# Patient Record
Sex: MALE | Race: WHITE | Employment: OTHER | URBAN - METROPOLITAN AREA
[De-identification: names, ages, dates, MRNs, and addresses within clinical notes are randomized per-mention and may not be internally consistent; named-entity substitution may affect disease eponyms.]

---

## 2017-01-16 ENCOUNTER — TRANSFERRED RECORDS (OUTPATIENT)
Dept: HEALTH INFORMATION MANAGEMENT | Facility: CLINIC | Age: 67
End: 2017-01-16

## 2017-05-01 ENCOUNTER — TRANSFERRED RECORDS (OUTPATIENT)
Dept: HEALTH INFORMATION MANAGEMENT | Facility: CLINIC | Age: 67
End: 2017-05-01

## 2017-12-26 ENCOUNTER — TRANSFERRED RECORDS (OUTPATIENT)
Dept: HEALTH INFORMATION MANAGEMENT | Facility: CLINIC | Age: 67
End: 2017-12-26

## 2018-02-25 ENCOUNTER — TRANSFERRED RECORDS (OUTPATIENT)
Dept: HEALTH INFORMATION MANAGEMENT | Facility: CLINIC | Age: 68
End: 2018-02-25

## 2018-03-15 ENCOUNTER — TRANSFERRED RECORDS (OUTPATIENT)
Dept: HEALTH INFORMATION MANAGEMENT | Facility: CLINIC | Age: 68
End: 2018-03-15

## 2018-04-08 ENCOUNTER — TRANSFERRED RECORDS (OUTPATIENT)
Dept: HEALTH INFORMATION MANAGEMENT | Facility: CLINIC | Age: 68
End: 2018-04-08

## 2018-04-11 ENCOUNTER — TRANSFERRED RECORDS (OUTPATIENT)
Dept: HEALTH INFORMATION MANAGEMENT | Facility: CLINIC | Age: 68
End: 2018-04-11

## 2018-04-25 ENCOUNTER — TRANSFERRED RECORDS (OUTPATIENT)
Dept: HEALTH INFORMATION MANAGEMENT | Facility: CLINIC | Age: 68
End: 2018-04-25

## 2018-04-27 ENCOUNTER — CARE COORDINATION (OUTPATIENT)
Dept: SURGERY | Facility: CLINIC | Age: 68
End: 2018-04-27

## 2018-04-27 DIAGNOSIS — Z98.84 HISTORY OF BARIATRIC SURGERY: Primary | ICD-10-CM

## 2018-05-31 ENCOUNTER — OFFICE VISIT (OUTPATIENT)
Dept: SURGERY | Facility: CLINIC | Age: 68
End: 2018-05-31
Payer: COMMERCIAL

## 2018-05-31 ENCOUNTER — APPOINTMENT (OUTPATIENT)
Dept: SURGERY | Facility: CLINIC | Age: 68
End: 2018-05-31
Payer: COMMERCIAL

## 2018-05-31 ENCOUNTER — ALLIED HEALTH/NURSE VISIT (OUTPATIENT)
Dept: SURGERY | Facility: CLINIC | Age: 68
End: 2018-05-31
Payer: COMMERCIAL

## 2018-05-31 ENCOUNTER — ANESTHESIA EVENT (OUTPATIENT)
Dept: SURGERY | Facility: CLINIC | Age: 68
End: 2018-05-31

## 2018-05-31 ENCOUNTER — ALLIED HEALTH/NURSE VISIT (OUTPATIENT)
Dept: GASTROENTEROLOGY | Facility: CLINIC | Age: 68
End: 2018-05-31
Payer: COMMERCIAL

## 2018-05-31 VITALS
WEIGHT: 160.2 LBS | OXYGEN SATURATION: 100 % | DIASTOLIC BLOOD PRESSURE: 80 MMHG | SYSTOLIC BLOOD PRESSURE: 133 MMHG | HEART RATE: 95 BPM | HEIGHT: 70 IN | BODY MASS INDEX: 22.94 KG/M2 | TEMPERATURE: 97.5 F

## 2018-05-31 VITALS
TEMPERATURE: 97.5 F | DIASTOLIC BLOOD PRESSURE: 80 MMHG | HEIGHT: 70 IN | BODY MASS INDEX: 23.29 KG/M2 | WEIGHT: 162.7 LBS | SYSTOLIC BLOOD PRESSURE: 133 MMHG | OXYGEN SATURATION: 100 % | RESPIRATION RATE: 18 BRPM | HEART RATE: 95 BPM

## 2018-05-31 DIAGNOSIS — K28.7: Primary | ICD-10-CM

## 2018-05-31 DIAGNOSIS — Z01.818 PREOP EXAMINATION: ICD-10-CM

## 2018-05-31 DIAGNOSIS — I34.0 MITRAL VALVE INSUFFICIENCY, UNSPECIFIED ETIOLOGY: ICD-10-CM

## 2018-05-31 DIAGNOSIS — Z98.84 HISTORY OF ROUX-EN-Y GASTRIC BYPASS: Primary | ICD-10-CM

## 2018-05-31 DIAGNOSIS — Z87.891 HISTORY OF SMOKING: ICD-10-CM

## 2018-05-31 DIAGNOSIS — Z98.84 H/O GASTRIC BYPASS: ICD-10-CM

## 2018-05-31 DIAGNOSIS — R60.0 PERIPHERAL EDEMA: ICD-10-CM

## 2018-05-31 DIAGNOSIS — Z01.818 PREOP EXAMINATION: Primary | ICD-10-CM

## 2018-05-31 DIAGNOSIS — R06.09 EXERTIONAL DYSPNEA: ICD-10-CM

## 2018-05-31 DIAGNOSIS — I35.0 MODERATE AORTIC STENOSIS: ICD-10-CM

## 2018-05-31 LAB
ABO + RH BLD: NORMAL
ABO + RH BLD: NORMAL
ALBUMIN UR-MCNC: NEGATIVE MG/DL
ANION GAP SERPL CALCULATED.3IONS-SCNC: 7 MMOL/L (ref 3–14)
APPEARANCE UR: CLEAR
BILIRUB UR QL STRIP: NEGATIVE
BLD GP AB SCN SERPL QL: NORMAL
BLOOD BANK CMNT PATIENT-IMP: NORMAL
BLOOD BANK CMNT PATIENT-IMP: NORMAL
BUN SERPL-MCNC: 8 MG/DL (ref 7–30)
CALCIUM SERPL-MCNC: 8.8 MG/DL (ref 8.5–10.1)
CHLORIDE SERPL-SCNC: 108 MMOL/L (ref 94–109)
CO2 SERPL-SCNC: 26 MMOL/L (ref 20–32)
COLOR UR AUTO: YELLOW
CREAT SERPL-MCNC: 0.76 MG/DL (ref 0.66–1.25)
ERYTHROCYTE [DISTWIDTH] IN BLOOD BY AUTOMATED COUNT: 19.9 % (ref 10–15)
GFR SERPL CREATININE-BSD FRML MDRD: >90 ML/MIN/1.7M2
GLUCOSE SERPL-MCNC: 91 MG/DL (ref 70–99)
GLUCOSE UR STRIP-MCNC: NEGATIVE MG/DL
HCT VFR BLD AUTO: 46.2 % (ref 40–53)
HGB BLD-MCNC: 14.2 G/DL (ref 13.3–17.7)
HGB UR QL STRIP: NEGATIVE
INR PPP: 1.11 (ref 0.86–1.14)
KETONES UR STRIP-MCNC: NEGATIVE MG/DL
LEUKOCYTE ESTERASE UR QL STRIP: NEGATIVE
MCH RBC QN AUTO: 26.9 PG (ref 26.5–33)
MCHC RBC AUTO-ENTMCNC: 30.7 G/DL (ref 31.5–36.5)
MCV RBC AUTO: 88 FL (ref 78–100)
NITRATE UR QL: NEGATIVE
PH UR STRIP: 5 PH (ref 5–7)
PLATELET # BLD AUTO: 155 10E9/L (ref 150–450)
POTASSIUM SERPL-SCNC: 3.5 MMOL/L (ref 3.4–5.3)
RBC # BLD AUTO: 5.28 10E12/L (ref 4.4–5.9)
SODIUM SERPL-SCNC: 140 MMOL/L (ref 133–144)
SOURCE: ABNORMAL
SP GR UR STRIP: 1.02 (ref 1–1.03)
SPECIMEN EXP DATE BLD: NORMAL
UROBILINOGEN UR STRIP-MCNC: 4 MG/DL (ref 0–2)
WBC # BLD AUTO: 6.3 10E9/L (ref 4–11)

## 2018-05-31 RX ORDER — LISINOPRIL 10 MG/1
10 TABLET ORAL EVERY MORNING
Status: ON HOLD | COMMUNITY
Start: 2018-01-29 | End: 2018-08-02

## 2018-05-31 RX ORDER — SUCRALFATE 1 G/1
1 TABLET ORAL 4 TIMES DAILY
COMMUNITY
Start: 2018-05-21 | End: 2019-01-25

## 2018-05-31 RX ORDER — ATORVASTATIN CALCIUM 20 MG/1
20 TABLET, FILM COATED ORAL AT BEDTIME
COMMUNITY
Start: 2018-01-29

## 2018-05-31 RX ORDER — DIGOXIN 125 MCG
125 TABLET ORAL EVERY MORNING
Status: ON HOLD | COMMUNITY
Start: 2018-04-20 | End: 2018-08-01

## 2018-05-31 RX ORDER — OMEPRAZOLE 40 MG/1
40 CAPSULE, DELAYED RELEASE ORAL 2 TIMES DAILY
Qty: 60 CAPSULE | Refills: 11 | Status: SHIPPED | OUTPATIENT
Start: 2018-05-31

## 2018-05-31 RX ORDER — ALBUTEROL SULFATE 90 UG/1
1-2 AEROSOL, METERED RESPIRATORY (INHALATION) EVERY 6 HOURS PRN
COMMUNITY
Start: 2018-04-10

## 2018-05-31 RX ORDER — IPRATROPIUM BROMIDE AND ALBUTEROL SULFATE 2.5; .5 MG/3ML; MG/3ML
3 SOLUTION RESPIRATORY (INHALATION) 3 TIMES DAILY
COMMUNITY
Start: 2018-04-26 | End: 2019-01-25

## 2018-05-31 RX ORDER — OXYCODONE AND ACETAMINOPHEN 10; 325 MG/1; MG/1
1 TABLET ORAL EVERY 4 HOURS
COMMUNITY
Start: 2018-05-29

## 2018-05-31 RX ORDER — CLONIDINE HYDROCHLORIDE 0.1 MG/1
0.1 TABLET ORAL 2 TIMES DAILY
Status: ON HOLD | COMMUNITY
Start: 2018-01-31 | End: 2018-08-02

## 2018-05-31 RX ORDER — PROCHLORPERAZINE MALEATE 5 MG
5 TABLET ORAL EVERY 6 HOURS PRN
COMMUNITY
Start: 2018-04-08 | End: 2019-09-26

## 2018-05-31 RX ORDER — ALBUTEROL SULFATE 0.83 MG/ML
2.5 SOLUTION RESPIRATORY (INHALATION) 2 TIMES DAILY
COMMUNITY
Start: 2018-04-26 | End: 2019-01-25

## 2018-05-31 RX ORDER — SUMATRIPTAN 100 MG/1
100 TABLET, FILM COATED ORAL 2 TIMES DAILY PRN
COMMUNITY
Start: 2018-04-10

## 2018-05-31 RX ORDER — PREGABALIN 100 MG/1
100 CAPSULE ORAL 4 TIMES DAILY
COMMUNITY
Start: 2018-05-24

## 2018-05-31 RX ORDER — FENTANYL 50 UG/1
1 PATCH TRANSDERMAL
COMMUNITY
Start: 2018-05-04

## 2018-05-31 RX ORDER — OMEPRAZOLE 40 MG/1
40 CAPSULE, DELAYED RELEASE ORAL EVERY MORNING
COMMUNITY
Start: 2017-12-18 | End: 2018-07-19

## 2018-05-31 ASSESSMENT — ENCOUNTER SYMPTOMS: DYSRHYTHMIAS: 1

## 2018-05-31 NOTE — LETTER
"2018       RE: Migel Stringer  [address]  [facility] [address]     Dear Colleague,    Thank you for referring your patient, Migel Stringer, to the [facility] SURGICAL WEIGHT MANAGEMENT at [facility]. Please see a copy of my visit note below.     New Bariatric Surgery Re-Establish Care/Referral Consultation      RE: Migel Stringer  MR#: [id number]  : 1950  NEETA: 18    Requesting provider: Navin Valle    Chief Complaint/Reason for visit: evaluation for possible reversal of gastric bypass    HISTORY OF PRESENT ILLNESS: I had the pleasure of seeing your patient, Migel Stringer, today in our bariatric surgery clinic.  As you know, Migel Stringer is 68 year old.  He has a height of 5' 9.5\", a weight of 160 lbs 3.2 oz, and calculated Body mass index is 23.32 kg/(m^2)..      He has a history of Robel-En-Y gastric bypass in . He has had chronic problems with food intolerance. He has had frequent problems with marginal ulceration. He continued to smoke until recently, but has quit smoking cigarettes. He continues to smoke cigars. 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 He continues to have food intolerance and vomiting. He has lost weight recently.     Last albumin 2018 was 3.4. He is interested in undergoing reversal of his bypass. Of note he has a known AAA which is being worked up at [facility]. He also has a recently diagnosed renal mass concerning for malignancy. This has not been biopsied or worked up yet.     Weight Loss History Reviewed with Patient 2018   How long have you been overweight? From Middle age and beyond   What is the most that you have ever weighed? 270   What is the most weight you have lost? 110   I have tried the following methods to lose weight Weight Loss Surgery   I have tried the following weight loss medications? (Check all that apply) None "   Have you ever had weight loss surgery? Yes   Please select the type of weight loss surgery you had (select all that apply): gastric bypass / Robel-en-Y       Past Medical History:   Diagnosis Date     AAA (abdominal aortic aneurysm) (H)      Aortic stenosis      Asthma      Atrial fibrillation (H)      Chronic low back pain      Chronic nausea      GERD (gastroesophageal reflux disease)      Heart murmur      Heart rate problem      History of sleep apnea      Hyperlipidemia      Hypertension      Insomnia      Migraines      Mitral regurgitation      Peripheral neuropathy      Status post gastric bypass for obesity      Ulcer, gastric, acute      Vomiting in adult        Past Surgical History:   Procedure Laterality Date     CHOLECYSTECTOMY       COLONOSCOPY       ESOPHAGOGASTRODUODENOSCOPY       GASTRIC BYPASS  2011     HERNIA REPAIR       SPINE SURGERY      hx of 6 lumbar surgeries and 1 thoracic surgery       FAMILY HISTORY:   Family History   Problem Relation Age of Onset     Skin Cancer Mother      Chronic Obstructive Pulmonary Disease Mother      DIABETES Mother      Liver Cancer Father      Breast Cancer Father      Breast Cancer Sister      No Known Problems Brother      DIABETES Maternal Grandmother      CANCER Paternal Grandmother      unspecified cancer     No Known Problems Brother      CANCER Paternal Grandfather      unspecified cancer       SOCIAL HISTORY:   Social History Questions Reviewed With Patient 5/31/2018   Which best describes your employment status (select all that apply) I am laid off   If you work, what is your occupation?    Which best describes your marital status:    Do you have children? Yes   Who do you have in your support network that can be available to help you for the first 2 weeks after surgery? my wife   Who can you count on for support throughout your weight loss surgery journey? my wife   Can you afford 3 meals a day?  Yes   Can you afford 50-60 dollars  "a month for vitamins? No       PSYCHOLOGICAL HISTORY:   Psychological History Reviewed With Patient 5/31/2018   Have you ever attempted suicide? Never.   Have you had thoughts of suicide in the past year? No   Have you ever been hospitalized for mental illness or a suicide attempt? Never.   Do you have a history of chronic pain? Yes   Have you ever been diagnosed with fibromyalgia? No   Are you currently seeing a therapist or counselor?  No   Are you currently seeing a psychiatrist? No       ROS    MEDICATIONS:  Current Outpatient Prescriptions   Medication     albuterol (2.5 MG/3ML) 0.083% neb solution     albuterol (PROAIR HFA/PROVENTIL HFA/VENTOLIN HFA) 108 (90 Base) MCG/ACT Inhaler     aspirin-acetaminophen-caffeine (EXCEDRIN MIGRAINE) 250-250-65 MG per tablet     atorvastatin (LIPITOR) 20 MG tablet     cloNIDine (CATAPRES) 0.1 MG tablet     digoxin (LANOXIN) 125 MCG tablet     diphenhydrAMINE-acetaminophen (TYLENOL PM)  MG tablet     fentaNYL (DURAGESIC) 50 mcg/hr 72 hr patch     ipratropium - albuterol 0.5 mg/2.5 mg/3 mL (DUONEB) 0.5-2.5 (3) MG/3ML neb solution     lisinopril (PRINIVIL/ZESTRIL) 10 MG tablet     omeprazole (PRILOSEC) 40 MG capsule     oxyCODONE-acetaminophen (PERCOCET)  MG per tablet     pregabalin (LYRICA) 100 MG capsule     prochlorperazine (COMPAZINE) 5 MG tablet     sucralfate (CARAFATE) 1 GM tablet     SUMAtriptan (IMITREX) 100 MG tablet     tiZANidine (ZANAFLEX) 4 MG tablet     No current facility-administered medications for this visit.        ALLERGIES:  Allergies   Allergen Reactions     Heparin      Other reaction(s): Thrombocytopenia  Platelets dropped precipitously 12/29/17 during hospitalization. HIT antibodies were not checked       /80  Pulse 95  Temp 97.5  F (36.4  C) (Oral)  Ht 1.765 m (5' 9.5\")  Wt 72.7 kg (160 lb 3.2 oz)  SpO2 100%  BMI 23.32 kg/m2    PHYSICAL EXAM:  Neurological: A & O x 3  Eyes: PERRL  ENT: mucous membranes moist  Skin: warm and " dry  Respiratory: Respirations unlabored  CV: Regular  Abdomen: Soft, nondistended. Minimal epigastric tenderness. Lap scars and midline laparotomy incision (spine surgery)    In summary, Migel Stringer is a 68 year old male who has a history of Gastric Bypass who is interested in reversal due to food intolerance and ulceration. He has had recent dilation of his strictured GJ anastomosis but still has very frequent vomiting. He is undergoing evaluation of a AAA and new renal mass. Also found to have cardiac murmur and echocardiogram is pending.     PLAN:  Increase omeprazole to BID      Plan for EGD with stent placement ([employer]), will be done soon in [facility] by Dr Ramsey.     Anticipate this will help with nausea/vomiting/food intolerance and help his nutrition      Primary care/urology to follow up for renal mass workup prior to any surgical intervention.    Workup of AAA pending in June with vascular surgeon from [facility] per PAC clinic.    Will need to absolutely quit smoking cigars prior to any possible surgery    Sincerely,    Jalen Ramsey MD  Surgery  [phone] (hospital )  [phone] (clinic nurses)

## 2018-05-31 NOTE — ADDENDUM NOTE
Addendum  created 05/31/18 1451 by Jyothi Whitmore APRN CNP    Diagnosis association updated, Visit diagnoses modified

## 2018-05-31 NOTE — NURSING NOTE
"(   Chief Complaint   Patient presents with     Consult     NRB, discuss reversal per [facility] OR date 6/22/18    )    ( Weight: 160 lb 3.2 oz )  ( Height: 5' 9.5\" )  ( BMI (Calculated): 23.37 )  ( Initial Weight: 160 lb 3.2 oz )  ( Cumulative weight loss (lbs): 0 )  (   )  (   )  (   )  (   )    ( BP: 133/80 )  (   )  ( Temp: 97.5  F (36.4  C) )  ( Temp src: Oral )  ( Pulse: 95 )  (   )  ( SpO2: 100 % )    ( There is no problem list on file for this patient.   )  (   Current Outpatient Prescriptions   Medication Sig Dispense Refill     albuterol (2.5 MG/3ML) 0.083% neb solution Inhale 2.5 mg into the lungs 2 times daily       albuterol (PROAIR HFA/PROVENTIL HFA/VENTOLIN HFA) 108 (90 Base) MCG/ACT Inhaler 2 times daily       aspirin-acetaminophen-caffeine (EXCEDRIN MIGRAINE) 250-250-65 MG per tablet Take 1 tablet by mouth as needed for headaches       atorvastatin (LIPITOR) 20 MG tablet Take 20 mg by mouth every morning       cloNIDine (CATAPRES) 0.1 MG tablet Take 0.1 mg by mouth 2 times daily       digoxin (LANOXIN) 125 MCG tablet Take 125 mcg by mouth every morning       diphenhydrAMINE-acetaminophen (TYLENOL PM)  MG tablet Take 6 tablets by mouth At Bedtime       fentaNYL (DURAGESIC) 50 mcg/hr 72 hr patch 1 patch every 72 hours       ipratropium - albuterol 0.5 mg/2.5 mg/3 mL (DUONEB) 0.5-2.5 (3) MG/3ML neb solution Inhale 3 mLs into the lungs 3 times daily       lisinopril (PRINIVIL/ZESTRIL) 10 MG tablet Take 10 mg by mouth every morning       omeprazole (PRILOSEC) 40 MG capsule Take 40 mg by mouth every morning       oxyCODONE-acetaminophen (PERCOCET)  MG per tablet Take 1 tablet by mouth every 4 hours       pregabalin (LYRICA) 100 MG capsule Take 100 mg by mouth 4 times daily       prochlorperazine (COMPAZINE) 5 MG tablet Take 5 mg by mouth as needed       sucralfate (CARAFATE) 1 GM tablet Take 1 g by mouth 4 times daily       SUMAtriptan (IMITREX) 100 MG tablet Take 100 mg by mouth " as needed       tiZANidine (ZANAFLEX) 4 MG tablet Take 4 mg by mouth as needed      )  ( Diabetes Eval:    )    ( Pain Eval:  Data Unavailable )    ( Wound Eval:       )    (   History   Smoking Status     Former Smoker     Packs/day: 1.00     Years: 50.00     Types: Cigarettes, Cigars     Quit date: 2016   Smokeless Tobacco     Never Used     Comment: quit cigarettes in 2016, but still smokes an occasional cigar( 5/31/2018)    )  Patient medication list, drug allergies and history was reviewed today at PAC.    ( Signed By:  Debi Rocha; May 31, 2018; 12:50 PM )

## 2018-05-31 NOTE — PATIENT INSTRUCTIONS
Preparing for Your Surgery      Name:  Migel Stringer   MRN:  [id number]   :  1950   Today's Date:  2018     Arriving for surgery:  Surgery date:  You are currently not on the surgery schedule.  You will be called 1-2 days prior to surgery by Preadmission Nursing, [phone].  You will be called with time, date, location and diet for surgery.        What can I eat or drink?  -  You may have solid food or milk products until 2 days prior to your surgery, then begin Clear liquid diet.  Please refer to instructions from Dr. Ramsey's office.   -  After Midnight the day of surgery you may have water until 3 hours prior to your surgery.    Which medicines can I take?  -  Do NOT take these medications in the morning, the day of surgery:  Stop vitamins and supplements one week prior to surgery.  Please stop Excedrin Migraine with aspirin one week prior to surgery.  Please do not take Lisinopril, sucralfate and imitrex the morning of surgery.     -  Please take these medications the day of surgery:  Please take clonidine, digoxin, fentanyl patch, Albuterol Neb, Albuterol inhaler, DuoNeb, omeprazole(prilosec) and pregabalin (lyrica) the morning of surgery.  You may take Percocet, compazine and/or tizanidine the morning of procedure if needed.    Bring inhalers with the day of surgery.     How do I prepare myself?  -  Take two showers: one the night before surgery; and one the morning of surgery.         Use Scrubcare or Hibiclens to wash from neck down.  You may use your own shampoo and conditioner. No other hair products.   -  Do NOT use lotion, powder, deodorant, or antiperspirant the day of your surgery.  -  Do NOT wear any makeup, fingernail polish or jewelry.  - Do not bring your own medications to the hospital, except for inhalers and eye drops.  -  Bring your ID and insurance card.    Questions or Concerns:  If you have questions or concerns regarding the day of surgery, please call: for [facility] call [phone], for the [facility] call [phone].  After surgery please call your surgeons office.           AFTER YOUR SURGERY  Breathing exercises   Breathing exercises help you recover faster. Take deep breaths and let the air out slowly. This will:     Help you wake up after surgery.    Help prevent complications like pneumonia.  Preventing complications will help you go home sooner.   We may give you a breathing device (incentive spirometer) to encourage you to breathe deeply.   Nausea and vomiting   You may feel sick to your stomach after surgery; if so, let your nurse know.    Pain control:  After surgery, you may have pain. Our goal is to help you manage your pain. Pain medicine will help you feel comfortable enough to do activities that will help you heal.  These activities may include breathing exercises, walking and physical therapy.   To help your health care team treat your pain we will ask: 1) If you have pain  2) where it is located 3) describe your pain in your words  Methods of pain control include medications given by mouth, vein or by nerve block for some surgeries.  We may give you a pain control pump that will:  1) Deliver the medicine through a tube placed in your vein  2) Control the amount of medicine you receive  3) Allow you to push a button to deliver a dose of pain medicine  Sequential Compression Device (SCD) or Pneumo Boots:  You may need to wear SCD S on your legs or feet. These are wraps connected to a machine that pumps in air and releases it. The repeated pumping helps prevent blood clots from forming.

## 2018-05-31 NOTE — ANESTHESIA PREPROCEDURE EVALUATION
Anesthesia Evaluation     . Pt has had prior anesthetic. Type: General    No history of anesthetic complications          ROS/MED HX    ENT/Pulmonary:     (+)BHANU risk factors hypertension, daytime somnolence, Current use 1 packs/day  Moderate Persistent asthma Last exacerbation: once in the last year (unsure of date),Treatment: Nebulizer prn and Inhaler prn,  , . .   Tobacco use: quit cigarettes in 2016, but still has an occasional cigar.   Neurologic:     (+)neuropathy - RLE, migraines,     Cardiovascular: Comment: AAA    (+) Dyslipidemia, hypertension----. : . . LUCIANO, . :. dysrhythmias a-fib, valvular problems/murmurs type: AS and MR . Previous cardiac testing Echodate:10/2017results:Final Impressions:   1. Normal LV size, mildly increased wall thickness, hyperdynamic global systolic function with an estimated EF of 60 - 65%.   2. The aortic valve is calcified, moderate stenosis (M mmHg, DULCE: 1.2 cm2) and trivial regurgitation.   3. Right ventricular cavity size is normal, global systolic RV function is normal.   4. The mitral valve is sclerotic, mild to moderate mitral regurgitation.   5. The inferior vena cava is dilated, respiratory size variation less than 50%, consistent with elevated right atrial pressure.   6. Moderate biatrial enalrgement.date: results: date: results: date: results:         (-) taking anticoagulants/antiplatelets   METS/Exercise Tolerance:  1 - Eating, dressing   Hematologic:     (+) History of Transfusion no previous transfusion reaction -     (-) history of blood clots   Musculoskeletal:   (+) , , other musculoskeletal- chronic back pain      GI/Hepatic:     (+) GERD Symptomatic, bowel prep, Other GI/Hepatic chronic daily nausea and vomiting      Renal/Genitourinary:     (+) Other Renal/ Genitourinary, left renal mass      Endo:  - neg endo ROS       Psychiatric:     (+) psychiatric history other (comment) (insomnia)      Infectious Disease:  - neg infectious disease ROS        Malignancy:      - no malignancy   Other: Comment: Chronic fatigue   (+) H/O Chronic Pain,H/O chronic opiod use ,                    Physical Exam      Airway   Mallampati: III  TM distance: >3 FB  Neck ROM: full    Dental   (+) chipped and missing  Comment: 2 right upper broken teeth, 2 right upper missing teeth    Cardiovascular   Rhythm and rate: regular and normal  (+) peripheral edema and murmur       Pulmonary (+) wheezes                  PAC Discussion and Assessment    ASA Classification: 3  Case is suitable for: [place]  Anesthetic techniques and relevant risks discussed: GA  Invasive monitoring and risk discussed:   Types:   Possibility and Risk of blood transfusion discussed:   NPO instructions given:   Additional anesthetic preparation and risks discussed:   Needs early admission to pre-op area:   Other:     PAC Resident/NP Anesthesia Assessment:  Migel Roche is a 68 year old male not yet scheduled for a possible gastric bypass reversal on TBD by Dr. Ramsey in treatment of chronic complications s/p gastric bypass in 2011.  PAC referral for risk assessment and optimization for anesthesia with comorbid conditions of: atrial fibrillation, hypertension, hyperlipidemia, aortic stenosis, mitral valve regurgitation, AA, asthma, left renal mass, history of sleep apnea, chronic fatigue, migraines, chronic low back pain, GERD and insomnia.      Pre-operative considerations:  1.  Cardiac:  Functional status- METS 1.  The patient is minimally physically active due to chronic fatigue and weakness that he attributes to his chronic complications s/p gastric bypass.  He will get SOB with minimal exertion.  He is on digoxin for A-fib, but reports he was told he didn't need to be anticoagulated.  Hypertension is managed with lisinopril and clonidine; hold lisinopril DOS.  He has chronic BLE peripheral edema and reports significant fatigue.  Per an echocardiogram done in Oct 2017 he has moderate aortic stenosis and  mitral regurgitation.  He doesn't follow with cardiology.  He smoked cigarettes for approximately 50 years until 2016, but does still smoke an occasional cigar.  Due to the noted history and the upcoming surgery a follow up stress echo has been ordered for further evaluation.  It was also noted upon Care Everywhere chart review that the patient has a AAA that is approximately 4.0 -5.1cm (size dependent on different radiologic imaging) and when asked about this his wife notes that they were referred to vascular at either [facility] or [facility], but haven't scheduled yet.  Intermediate risk surgery with 0.9% risk of major adverse cardiac event.   2.  Pulm:  Airway feasible.  BHANU risk: intermediate.  He reports that he was previously diagnosed with sleep apnea and had a CPAP, but since losing over 100lbs since his gastric bypass in 2011 he stopped using the CPAP and no longer has symptoms of sleep apnea.  He has not been rechecked with a follow up sleep study though.  He reports having asthma, but never having a PFT before.  When questioned about his albuterol and duoneb use, he reports that he uses them every day despite them being prn meds.  He has never been on any maintenance inhalers.  He is wheezing throughout all lung fields on exam today.  A call has been placed to his primary care clinic and I discussed with Dr. Dorado (his pcp).  Requested that he order a PFT and get the patient started on maintenance therapy prior to surgery.  He will have the patient contacted to schedule. Will order duoneb for pre-op.  Smoking history of as above.    3.  GI:  Risk of PONV score = 1.  If > 2, anti-emetic intervention recommended.  No noted history of PONV, but he does have chronic N/V.  Prevention measures as per anesthesia DOS.  GERD isn't currently well controlled with omeprazole and carafate.    4. Neuro:  He has RLE peripheral neuropathy secondary to a traumatic spinal cord injury in 2004 and subsequent spinal  surgeries.  He managed intermittent migraines with prn excedrin and imitrex.  Instructed to hold excedrin for 7 days prior to surgery and imitrex for 24 hours prior to surgery.  5. Musculoskeletal/ pain:  He takes 5-6 tablets of percocet per day for chronic back pain.  Morphine equivalent dosing = 75-90mg.  Consider cautious positioning.    6. Renal: Upon Care Everywhere chart review it was noted that the patient had a CT scan in Feb 2018 that showed at 2.7cm left renal mass and there has been no follow up imaging as recommended by radiology at that time.  Encouraged the patient to discuss with his primary care provider and request further evaluation.      VTE risk: 1.8%    Patient is not yet optimized and will need more diagnostic testing prior to this elective procedure. The following needs to be addressed/completed prior to surgery:  Dobutamine stress echo, PFT and asthma/COPD management, further evaluation of renal mass and surgical consultation regarding the AAA.   This has all been discussed with Dr. Ramsey, the patient and the spouse.    Patient also evaluated by Dr. Jules. See recommendations below.     **For further details of assessment, testing, and physical exam please see H and P completed on same date.          Jyothi Whitmore DNP, RN, APRN      Reviewed and Signed by PAC Mid-Level Provider/Resident  Mid-Level Provider/Resident: Jyothi Whitmore DNP, RN, APRN  Date: 5/31/2018  Time: 1200    Attending Anesthesiologist Anesthesia Assessment:  68 year old for possible gastric bypass reversal due to significant vomiting, fatigue, etc. Chart reviewed, patient seen and evaluated; agree with above assessment. Patient has known aortic stenosis with DULCE 1.2 and gradient 25 mmHg a year ago. Progress ion of AS is an unlikely but potentially dangerous explanation for his fatigue and SOB so will get echo prior to surgery. Also could be anginal equivalent , so will get stress echo.     Also has wheezing today. That  with his SOB could represent COPD, so will have his PCP do PFTs and initiate long acting bronchodilator prior to surgery. Finally, will discuss with Dr. Ramsey the fact that abdominal echo a year ago showed 5.1 AAA.     Patient is not appropriate for the planned procedure without further workup or medical management change. We will continue to follow and work with his PCP to improve his pulmonary status and resolve the question of AS. The final anesthesia plan will be determined by the physician anesthesiologist caring for the patient on the day of surgery.      Reviewed and Signed by PAC Anesthesiologist  Anesthesiologist: chinmay  Date: 5/31/2018  Time:   Pass/Fail: Pass  Disposition:     PAC Pharmacist Assessment:        Pharmacist:   Date:   Time:                           .

## 2018-05-31 NOTE — H&P
Pre-Operative H & P     CC:  Preoperative exam to assess for increased cardiopulmonary risk while undergoing surgery and anesthesia.    Date of Encounter: 5/31/2018  Primary Care Physician:  No Ref-Primary, Physician  Associated diagnosis:  S/p gastric bypass    HPI  Migel Stringer is a 68 year old male who presents for pre-operative H & P in preparation for a possible gastric bypass reversal with Dr. Ramsey on TBD at [facility].     Migel Roche is a 68 year old male with atrial fibrillation, hypertension, hyperlipidemia, aortic stenosis, mitral valve regurgitation, AA, asthma, left renal mass, history of sleep apnea, chronic fatigue, migraines, chronic low back pain, GERD and insomnia that has had multiple complications since having a gastric bypass in 2011.  He reports having chronic GERD (not controlled well by medication), nausea, weight loss, fatigue and weakness ever since the surgery.  He has difficulty eating much due to the symptoms and has lost >100lbs.  He reports that he previously was told there was nothing that could be done, but then his wife found though some internet research that a reversal of the gastric bypass may be possible.  He is scheduled to consult with Dr. Ramsey today to discuss the possibility of that type of surgery.        History is obtained from the patient and his spouse.     Past Medical History  Past Medical History:   Diagnosis Date     Aortic stenosis      Asthma      Atrial fibrillation (H)      Chronic low back pain      Chronic nausea      GERD (gastroesophageal reflux disease)      History of sleep apnea      Hyperlipidemia      Hypertension      Insomnia      Migraines      Mitral regurgitation      Peripheral neuropathy      Status post gastric bypass for obesity      Vomiting in adult        Past Surgical History  Past Surgical History:   Procedure Laterality Date     COLONOSCOPY       ESOPHAGOGASTRODUODENOSCOPY        GASTRIC BYPASS  2011     HERNIA REPAIR       SPINE SURGERY      hx of 6 lumbar surgeries and 1 thoracic surgery       Hx of Blood transfusions/reactions: yes, no reactions    Hx of abnormal bleeding or anti-platelet use: none    Steroid use in the last year: short course of prednisone once in the last year (unknown timeframe), but no long term steroids.     Personal or FH with difficulty with Anesthesia:  none    Prior to Admission Medications  Current Outpatient Prescriptions   Medication Sig Dispense Refill     albuterol (2.5 MG/3ML) 0.083% neb solution Inhale 2.5 mg into the lungs 2 times daily       albuterol (PROAIR HFA/PROVENTIL HFA/VENTOLIN HFA) 108 (90 Base) MCG/ACT Inhaler 2 times daily       aspirin-acetaminophen-caffeine (EXCEDRIN MIGRAINE) 250-250-65 MG per tablet Take 1 tablet by mouth as needed for headaches       atorvastatin (LIPITOR) 20 MG tablet Take 20 mg by mouth every morning       cloNIDine (CATAPRES) 0.1 MG tablet Take 0.1 mg by mouth 2 times daily       digoxin (LANOXIN) 125 MCG tablet Take 125 mcg by mouth every morning       diphenhydrAMINE-acetaminophen (TYLENOL PM)  MG tablet Take 6 tablets by mouth At Bedtime       fentaNYL (DURAGESIC) 50 mcg/hr 72 hr patch 1 patch every 72 hours       ipratropium - albuterol 0.5 mg/2.5 mg/3 mL (DUONEB) 0.5-2.5 (3) MG/3ML neb solution Inhale 3 mLs into the lungs 3 times daily       lisinopril (PRINIVIL/ZESTRIL) 10 MG tablet Take 10 mg by mouth every morning       omeprazole (PRILOSEC) 40 MG capsule Take 40 mg by mouth every morning       oxyCODONE-acetaminophen (PERCOCET)  MG per tablet Take 1 tablet by mouth every 4 hours       pregabalin (LYRICA) 100 MG capsule Take 100 mg by mouth 4 times daily       prochlorperazine (COMPAZINE) 5 MG tablet Take 5 mg by mouth as needed       sucralfate (CARAFATE) 1 GM tablet Take 1 g by mouth 4 times daily       SUMAtriptan (IMITREX) 100 MG tablet Take 100 mg by mouth as needed       tiZANidine (ZANAFLEX)  4 MG tablet Take 4 mg by mouth as needed         Allergies  Allergies   Allergen Reactions     Heparin      Other reaction(s): Thrombocytopenia  Platelets dropped precipitously 12/29/17 during hospitalization. HIT antibodies were not checked       Social History  Social History     Social History     Marital status:      Spouse name: N/A     Number of children: 4     Years of education: N/A     Occupational History      [employer]     Social History Main Topics     Smoking status: Former Smoker     Packs/day: 1.00     Years: 50.00     Types: Cigarettes, Cigars     Quit date: 2016     Smokeless tobacco: Never Used      Comment: quit cigarettes in 2016, but still smokes an occasional cigar( 5/31/2018)     Alcohol use Not on file      Comment: rare     Drug use: No     Sexual activity: Not on file     Other Topics Concern     Not on file     Social History Narrative     No narrative on file       Family History  Family History   Problem Relation Age of Onset     Skin Cancer Mother      Chronic Obstructive Pulmonary Disease Mother      Liver Cancer Father      Breast Cancer Sister      No Known Problems Brother      DIABETES Maternal Grandmother      CANCER Paternal Grandmother      unspecified cancer     No Known Problems Brother      CANCER Paternal Grandfather      unspecified cancer             ROS/MED HX  The complete review of systems is negative other than noted in the HPI or here.   ENT/Pulmonary:     (+)BHANU risk factors hypertension, daytime somnolence, Current use 1 packs/day  Moderate Persistent asthma Last exacerbation: once in the last year (unsure of date),Treatment: Nebulizer prn and Inhaler prn,  , . .   Tobacco use: quit cigarettes in 2016, but still has an occasional cigar.   Neurologic:     (+)neuropathy - RLE, migraines,     Cardiovascular:     (+) Dyslipidemia, hypertension----. : . . LUCIANO, . :. dysrhythmias a-fib, valvular problems/murmurs type: AS and MR .  AAA  "4-5.1cm  METS/Exercise Tolerance:  1 - Eating, dressing   Hematologic:     (+) History of Transfusion no previous transfusion reaction -     (-) history of blood clots   Musculoskeletal:   (+) , , other musculoskeletal- chronic back pain      GI/Hepatic:     (+) GERD Symptomatic, bowel prep, Other GI/Hepatic chronic daily nausea and vomiting      Renal/Genitourinary:  +left renal mass     Endo:  - neg endo ROS       Psychiatric:     (+) psychiatric history other (comment) (insomnia)      Infectious Disease:  - neg infectious disease ROS       Malignancy:      - no malignancy   Other: Comment: Chronic fatigue   (+) H/O Chronic Pain,H/O chronic opiod use ,                  Temp: 97.5  F (36.4  C) Temp src: Oral BP: 133/80 Pulse: 95   Resp: 18 SpO2: 100 %         162 lbs 11.2 oz  5' 10\"   Body mass index is 23.35 kg/(m^2).       Physical Exam  Constitutional: Awake, alert, cooperative, no apparent distress, and appears stated age.  Appears frail and fatigued.    Eyes: Pupils equal, round and reactive to light, extra ocular muscles intact, sclera clear, conjunctiva normal.  HENT: Normocephalic, oral pharynx with moist mucus membranes.  Poor dentition - 2 right upper broken teeth, 2 right upper missing teeth.  No goiter appreciated.   Respiratory: Inspiratory wheezing throughout.  Unlabored, symmetric breathing pattern.    Cardiovascular: Regular rate and rhythm, normal S1 and S2, and + murmur noted.  Carotids +2, no bruits. 1-2+ BLE pitting edema (R>L). Palpable pulses to radial  DP and PT arteries.   GI: Normal bowel sounds, soft, non-distended, non-tender, no masses palpated, no hepatosplenomegaly.  Surgical scars: midline abdomen  Lymph/Hematologic: No cervical lymphadenopathy and no supraclavicular lymphadenopathy.  Genitourinary:  deferred  Skin: Warm and dry.  No rashes at anticipated surgical site.   Musculoskeletal: Full ROM of neck. There is no redness, warmth, or swelling of the exposed joints. Gross motor " strength is normal.    Neurologic: Awake, alert, oriented to name, place and time. Cranial nerves II-XII are grossly intact. Gait is normal.   Neuropsychiatric: Calm, cooperative. Normal affect.     Labs: (personally reviewed)   Component      Latest Ref Rng & Units 2018   Color Urine       Yellow   Appearance Urine       Clear   Glucose Urine      NEG:Negative mg/dL Negative   Bilirubin Urine      NEG:Negative Negative   Ketones Urine      NEG:Negative mg/dL Negative   Specific Gravity Urine      1.003 - 1.035 1.024   Blood Urine      NEG:Negative Negative   pH Urine      5.0 - 7.0 pH 5.0   Protein Albumin Urine      NEG:Negative mg/dL Negative   Urobilinogen mg/dL      0.0 - 2.0 mg/dL 4.0 (H)   Nitrite Urine      NEG:Negative Negative   Leukocyte Esterase Urine      NEG:Negative Negative   Source       Midstream Urine   Sodium      133 - 144 mmol/L 140   Potassium      3.4 - 5.3 mmol/L 3.5   Chloride      94 - 109 mmol/L 108   Carbon Dioxide      20 - 32 mmol/L 26   Anion Gap      3 - 14 mmol/L 7   Glucose      70 - 99 mg/dL 91   Urea Nitrogen      7 - 30 mg/dL 8   Creatinine      0.66 - 1.25 mg/dL 0.76   GFR Estimate      >60 mL/min/1.7m2 >90   GFR Estimate If Black      >60 mL/min/1.7m2 >90   Calcium      8.5 - 10.1 mg/dL 8.8   WBC      4.0 - 11.0 10e9/L 6.3   RBC Count      4.4 - 5.9 10e12/L 5.28   Hemoglobin      13.3 - 17.7 g/dL 14.2   Hematocrit      40.0 - 53.0 % 46.2   MCV      78 - 100 fl 88   MCH      26.5 - 33.0 pg 26.9   MCHC      31.5 - 36.5 g/dL 30.7 (L)   RDW      10.0 - 15.0 % 19.9 (H)   Platelet Count      150 - 450 10e9/L 155   INR      0.86 - 1.14 1.11       Cardiac echo:   2017  Final Impressions:   1. Normal LV size, mildly increased wall thickness, hyperdynamic global systolic function with an estimated EF of 60 - 65%.   2. The aortic valve is calcified, moderate stenosis (M mmHg, DULCE: 1.2 cm2) and trivial regurgitation.   3. Right ventricular cavity size is normal, global  systolic RV function is normal.   4. The mitral valve is sclerotic, mild to moderate mitral regurgitation.   5. The inferior vena cava is dilated, respiratory size variation less than 50%, consistent with elevated right atrial pressure.   6. Moderate biatrial enalrgement.      Stress test:  Awaiting results - to be scheduled at [facility]  PFT's: Awaiting results - to be scheduled at [facility] records reviewed from: Care Everywhere      ASSESSMENT and PLAN  Migel Roche is a 68 year old male not yet scheduled for a possible gastric bypass reversal on TBD by Dr. Ramsey in treatment of chronic complications s/p gastric bypass in 2011.  PAC referral for risk assessment and optimization for anesthesia with comorbid conditions of: atrial fibrillation, hypertension, hyperlipidemia, aortic stenosis, mitral valve regurgitation, AA, asthma, left renal mass, history of sleep apnea, chronic fatigue, migraines, chronic low back pain, GERD and insomnia.      Pre-operative considerations:  1.  Cardiac:  Functional status- METS 1.  The patient is minimally physically active due to chronic fatigue and weakness that he attributes to his chronic complications s/p gastric bypass.  He will get SOB with minimal exertion.  He is on digoxin for A-fib, but reports he was told he didn't need to be anticoagulated.  Hypertension is managed with lisinopril and clonidine; hold lisinopril DOS.  He has chronic BLE peripheral edema and reports significant fatigue.  Per an echocardiogram done in Oct 2017 he has moderate aortic stenosis and mitral regurgitation.  He doesn't follow with cardiology.  He smoked cigarettes for approximately 50 years until 2016, but does still smoke an occasional cigar.  Due to the noted history and the upcoming surgery a follow up stress echo has been ordered for further evaluation.  It was also noted upon Care Everywhere chart review that the patient has a AAA that is approximately 4.0 -5.1cm  (size dependent on different radiologic imaging) and when asked about this his wife notes that they were referred to vascular at either [facility] or [facility], but haven't scheduled yet.  Intermediate risk surgery with 0.9% risk of major adverse cardiac event.   2.  Pulm:  Airway feasible.  BHANU risk: intermediate.  He reports that he was previously diagnosed with sleep apnea and had a CPAP, but since losing over 100lbs since his gastric bypass in 2011 he stopped using the CPAP and no longer has symptoms of sleep apnea.  He has not been rechecked with a follow up sleep study though.  He reports having asthma, but never having a PFT before.  When questioned about his albuterol and duoneb use, he reports that he uses them every day despite them being prn meds.  He has never been on any maintenance inhalers.  He is wheezing throughout all lung fields on exam today.  A call has been placed to his primary care clinic and I discussed with Dr. Dorado (his pcp).  Requested that he order a PFT and get the patient started on maintenance therapy prior to surgery.  He will have the patient contacted to schedule. Will order duoneb for pre-op.  Smoking history of as above.    3.  GI:  Risk of PONV score = 1.  If > 2, anti-emetic intervention recommended.  No noted history of PONV, but he does have chronic N/V.  Prevention measures as per anesthesia DOS.  GERD isn't currently well controlled with omeprazole and carafate.    4. Neuro:  He has RLE peripheral neuropathy secondary to a traumatic spinal cord injury in 2004 and subsequent spinal surgeries.  He managed intermittent migraines with prn excedrin and imitrex.  Instructed to hold excedrin for 7 days prior to surgery and imitrex for 24 hours prior to surgery.  5. Musculoskeletal/ pain:  He takes 5-6 tablets of percocet per day for chronic back pain.  Morphine equivalent dosing = 75-90mg.  Consider cautious positioning.    6. Renal: Upon Care Everywhere chart review it was  noted that the patient had a CT scan in Feb 2018 that showed at 2.7cm left renal mass and there has been no follow up imaging as recommended by radiology at that time.  Encouraged the patient to discuss with his primary care provider and request further evaluation.      VTE risk: 1.8%    Patient is not yet optimized and will need more diagnostic testing prior to this elective procedure. The following needs to be addressed/completed prior to surgery:  Dobutamine stress echo, PFT and asthma/COPD management, further evaluation of renal mass and surgical consultation regarding the AAA.   This has all been discussed with Dr. Ramsey, the patient and the spouse.    Patient also evaluated by Dr. Jules. See recommendations below.        Jyothi Whitmore DNP, RN, APRN  Preoperative Assessment Center  [facility]  Phone: [phone]  Fax: [phone]

## 2018-05-31 NOTE — MR AVS SNAPSHOT
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      Who to contact     Please call your clinic at [phone] to:    Ask questions about your health    Make or cancel appointments    Discuss your medicines    Learn about your test results    Speak to your doctor            Additional Information About Your Visit        MyChart Information     Yoke is an electronic gateway that provides easy, online access to your medical records. With Yoke, you can request a clinic appointment, read your test results, renew a prescription or communicate with your care team.     To sign up for [employer] visit the website at [email or web address]   You will be asked to enter the access code listed below, as well as some personal information. Please follow the directions to create your username and password.     Your access code is: [email or web address]  Expires: 8/15/2018  6:31 AM     Your access code will  in 90 days. If you need help or a new code, please contact your [facility] or call [phone] for assistance.        Care EveryWhere ID     This is your Care EveryWhere ID. This could be used by other organizations to access your [facility] medical records  [id number]        Your Vitals Were     Pulse Temperature Height Pulse Oximetry BMI (Body Mass Index)       95 97.5  F (36.4  C) (Oral) 5' 9.5\" 100% 23.32 kg/m2        Blood Pressure from Last 3 Encounters:   18 133/80   18 133/80 "    Weight from Last 3 Encounters:   05/31/18 160 lb 3.2 oz   05/31/18 162 lb 11.2 oz              Today, you had the following     No orders found for display         Today's Medication Changes          These changes are accurate as of 5/31/18 11:59 PM.  If you have any questions, ask your nurse or doctor.               These medicines have changed or have updated prescriptions.        Dose/Directions    * omeprazole 40 MG capsule   Commonly known as:  priLOSEC   This may have changed:  Another medication with the same name was added. Make sure you understand how and when to take each.   Changed by:  Jalen Ramsey MD        Dose:  40 mg   Take 40 mg by mouth every morning   Refills:  0       * omeprazole 40 MG capsule   Commonly known as:  priLOSEC   This may have changed:  You were already taking a medication with the same name, and this prescription was added. Make sure you understand how and when to take each.   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   Quantity:  60 capsule   Refills:  11       * Notice:  This list has 2 medication(s) that are the same as other medications prescribed for you. Read the directions carefully, and ask your doctor or other care provider to review them with you.         Where to get your medicines      These medications were sent to [id number] - [place], [place] - [address]  [address] [place] [address]     Phone:  [phone]     omeprazole 40 MG capsule                Primary Care Provider Fax #    Physician No Ref-Primary [phone]       No address on file        Equal Access to Services     [facility]: Yasir Newell, cecilia booker, susan cooley. So [facility] [phone].    ATENCIÓN:  Si habla kevin, tiene a alvarez disposición servicios gratuitos de asistencia lingüística. Jasmyne tenorio [phone].    We comply with applicable federal civil rights laws and [place] laws. We do not discriminate on the basis of race, color, national origin, age, disability, sex, sexual orientation, or gender identity.            Thank you!     Thank you for choosing [facility]  for your care. Our goal is always to provide you with excellent care. Hearing back from our patients is one way we can continue to improve our services. Please take a few minutes to complete the written survey that you may receive in the mail after your visit with us. Thank you!             Your Updated Medication List - Protect others around you: Learn how to safely use, store and throw away your medicines at www.disposemymeds.org.          This list is accurate as of 5/31/18 11:59 PM.  Always use your most recent med list.                   Brand Name Dispense Instructions for use Diagnosis    * albuterol 108 (90 Base) MCG/ACT Inhaler    PROAIR HFA/PROVENTIL HFA/VENTOLIN HFA     2 times daily        * albuterol (2.5 MG/3ML) 0.083% neb solution      Inhale 2.5 mg into the lungs 2 times daily        aspirin-acetaminophen-caffeine 250-250-65 MG per tablet    EXCEDRIN MIGRAINE     Take 1 tablet by mouth as needed for headaches        atorvastatin 20 MG tablet    LIPITOR     Take 20 mg by mouth every morning        cloNIDine 0.1 MG tablet    CATAPRES     Take 0.1 mg by mouth 2 times daily        digoxin 125 MCG tablet    LANOXIN     Take 125 mcg by mouth every morning        diphenhydrAMINE-acetaminophen  MG tablet    TYLENOL PM     Take 6 tablets by mouth At Bedtime        fentaNYL 50 mcg/hr 72 hr patch    DURAGESIC     1 patch every 72 hours        ipratropium - albuterol 0.5 mg/2.5 mg/3 mL 0.5-2.5 (3) MG/3ML neb solution    DUONEB     Inhale 3 mLs into the lungs 3 times daily        lisinopril 10 MG tablet     PRINIVIL/ZESTRIL     Take 10 mg by mouth every morning        * omeprazole 40 MG capsule    priLOSEC     Take 40 mg by mouth every morning        * omeprazole 40 MG capsule    priLOSEC    60 capsule    Take 1 capsule (40 mg) by mouth 2 times daily Take 30-60 minutes before a meal.    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)       oxyCODONE-acetaminophen  MG per tablet    PERCOCET     Take 1 tablet by mouth every 4 hours        pregabalin 100 MG capsule    LYRICA     Take 100 mg by mouth 4 times daily        prochlorperazine 5 MG tablet    COMPAZINE     Take 5 mg by mouth as needed        sucralfate 1 GM tablet    CARAFATE     Take 1 g by mouth 4 times daily        SUMAtriptan 100 MG tablet    IMITREX     Take 100 mg by mouth as needed        tiZANidine 4 MG tablet    ZANAFLEX     Take 4 mg by mouth as needed        * Notice:  This list has 4 medication(s) that are the same as other medications prescribed for you. Read the directions carefully, and ask your doctor or other care provider to review them with you.

## 2018-05-31 NOTE — MR AVS SNAPSHOT
After Visit Summary   2018    Migel Stringer    MRN: [id number]           Patient Information     Date Of Birth          1950        Visit Information        Provider Department      2018 12:00 PM Rn, [facility]        Care Instructions    Preparing for Your Surgery      Name:  Migel Stringer   MRN:  [id number]   :  1950   Today's Date:  2018     Arriving for surgery:  Surgery date:  You are currently not on the surgery schedule.  You will be called 1-2 days prior to surgery by Preadmission Nursing, [phone].  You will be called with time, date, location and diet for surgery.        What can I eat or drink?  -  You may have solid food or milk products until 2 days prior to your surgery, then begin Clear liquid diet.  Please refer to instructions from Dr. Ramsey's office.   -  After Midnight the day of surgery you may have water until 3 hours prior to your surgery.    Which medicines can I take?  -  Do NOT take these medications in the morning, the day of surgery:  Stop vitamins and supplements one week prior to surgery.  Please stop Excedrin Migraine with aspirin one week prior to surgery.  Please do not take Lisinopril, sucralfate and imitrex the morning of surgery.     -  Please take these medications the day of surgery:  Please take clonidine, digoxin, fentanyl patch, Albuterol Neb, Albuterol inhaler, DuoNeb, omeprazole(prilosec) and pregabalin (lyrica) the morning of surgery.  You may take Percocet, compazine and/or tizanidine the morning of procedure if needed.    Bring inhalers with the day of surgery.     How do I prepare myself?  -  Take two showers: one the night before surgery; and one the morning of surgery.         Use Scrubcare or Hibiclens to wash from neck down.  You may use your own shampoo and conditioner. No other hair products.   -  Do NOT use lotion, powder, deodorant, or antiperspirant the day of your  surgery.  -  Do NOT wear any makeup, fingernail polish or jewelry.  - Do not bring your own medications to the hospital, except for inhalers and eye drops.  -  Bring your ID and insurance card.    Questions or Concerns:  If you have questions or concerns regarding the day of surgery, please call: for [facility] call [phone], for the [facility] call [phone].  After surgery please call your surgeons office.           AFTER YOUR SURGERY  Breathing exercises   Breathing exercises help you recover faster. Take deep breaths and let the air out slowly. This will:     Help you wake up after surgery.    Help prevent complications like pneumonia.  Preventing complications will help you go home sooner.   We may give you a breathing device (incentive spirometer) to encourage you to breathe deeply.   Nausea and vomiting   You may feel sick to your stomach after surgery; if so, let your nurse know.    Pain control:  After surgery, you may have pain. Our goal is to help you manage your pain. Pain medicine will help you feel comfortable enough to do activities that will help you heal.  These activities may include breathing exercises, walking and physical therapy.   To help your health care team treat your pain we will ask: 1) If you have pain  2) where it is located 3) describe your pain in your words  Methods of pain control include medications given by mouth, vein or by nerve block for some surgeries.  We may give you a pain control pump that will:  1) Deliver the medicine through a tube placed in your vein  2) Control the amount of medicine you receive  3) Allow you to push a button to deliver a dose of pain medicine  Sequential Compression Device (SCD) or Pneumo Boots:  You may need to wear SCD S on your legs or feet. These are wraps connected to a machine that pumps in air and releases it. The repeated pumping helps prevent blood clots from forming.           Follow-ups after your visit         Your next 10 appointments already scheduled     May 31, 2018 11:00 AM CDT   (Arrive by 10:45 AM)   PAC EVALUATION with Uc Pac Paulino 8   [facility] Preoperative Assessment [place] ([facility])    [address] [place] [id number] [phone]            May 31, 2018 12:00 PM CDT   (Arrive by 11:45 AM)   PAC RN ASSESSMENT with Uc Pac Rn   [facility] Preoperative Assessment [place] ([facility])    [address] [place] [address] [id number] [phone]            May 31, 2018 12:30 PM CDT   (Arrive by 12:15 PM)   PAC Anesthesia Consult with  Pac Anesthesiologist   [facility] Preoperative Assessment [place] ([facility])    [address] [place] [address] [id number] [phone]            May 31, 2018  1:20 PM CDT   (Arrive by 1:05 PM)   New Bariatric Revision with Jalen Ramsey MD   [facility] Surgical Weight Management ([facility])    [address] [place] [address] [id number] [phone]           Do not wear perfume.            May 31, 2018  2:00 PM CDT   NUTRITION VISIT with Yu Koehler RD   [facility] ([facility])    [address] [place] [address] [phone] [phone]              Who to contact     Please call your clinic at [phone] to:    Ask questions about your health    Make or cancel appointments    Discuss your medicines    Learn about your test results    Speak to your doctor            Additional Information About Your Visit        Quotefish Information     Quotefish is an electronic gateway that provides easy, online access to your medical records. With Quotefish, you can request a clinic appointment, read your test results, renew a prescription or communicate with your care team.     To sign up for Quotefish visit the website at  [email or web address]   You will be asked to enter the access code listed below, as well as some personal information. Please follow the directions to create your username and password.     Your access code is: [email or web address]  Expires: 8/15/2018  6:31 AM     Your access code will  in 90 days. If you need help or a new code, please contact your [facility] or call [phone] for assistance.        Care EveryWhere ID     This is your Care EveryWhere ID. This could be used by other organizations to access your [place] medical records  [id number]         Blood Pressure from Last 3 Encounters:   18 133/80    Weight from Last 3 Encounters:   18 73.8 kg (162 lb 11.2 oz)              Today, you had the following     No orders found for display       Primary Care Provider Fax #    Physician No Ref-Primary [phone]       No address on file        Equal Access to Services     MEME MCDANIEL : Hadii aad ku hadasho Somarioali, waaxda luqadaha, qaybta kaalmada adeegyada, susan manuel . So [facility] [phone].    ATENCIÓN: Si habla español, tiene a alvarez disposición servicios gratuitos de asistencia lingüística. Llame al [phone].    We comply with applicable federal civil rights laws and [place] laws. We do not discriminate on the basis of race, color, national origin, age, disability, sex, sexual orientation, or gender identity.            Thank you!     Thank you for choosing [facility] PREOPERATIVE [facility]  for your care. Our goal is always to provide you with excellent care. Hearing back from our patients is one way we can continue to improve our services. Please take a few minutes to complete the written survey that you may receive in the mail after your visit with us. Thank you!             Your Updated Medication List - Protect others around you: Learn how to safely use, store and throw away your medicines at  www.disposemymeds.org.          This list is accurate as of 5/31/18 10:14 AM.  Always use your most recent med list.                   Brand Name Dispense Instructions for use Diagnosis    * albuterol 108 (90 Base) MCG/ACT Inhaler    PROAIR HFA/PROVENTIL HFA/VENTOLIN HFA     2 times daily        * albuterol (2.5 MG/3ML) 0.083% neb solution      Inhale 2.5 mg into the lungs 2 times daily        aspirin-acetaminophen-caffeine 250-250-65 MG per tablet    EXCEDRIN MIGRAINE     Take 1 tablet by mouth as needed for headaches        atorvastatin 20 MG tablet    LIPITOR     Take 20 mg by mouth every morning        cloNIDine 0.1 MG tablet    CATAPRES     Take 0.1 mg by mouth 2 times daily        digoxin 125 MCG tablet    LANOXIN     Take 125 mcg by mouth every morning        diphenhydrAMINE-acetaminophen  MG tablet    TYLENOL PM     Take 6 tablets by mouth At Bedtime        fentaNYL 50 mcg/hr 72 hr patch    DURAGESIC     1 patch every 72 hours        ipratropium - albuterol 0.5 mg/2.5 mg/3 mL 0.5-2.5 (3) MG/3ML neb solution    DUONEB     Inhale 3 mLs into the lungs 3 times daily        lisinopril 10 MG tablet    PRINIVIL/ZESTRIL     Take 10 mg by mouth every morning        omeprazole 40 MG capsule    priLOSEC     Take 40 mg by mouth every morning        oxyCODONE-acetaminophen  MG per tablet    PERCOCET     Take 1 tablet by mouth every 4 hours        pregabalin 100 MG capsule    LYRICA     Take 100 mg by mouth 4 times daily        prochlorperazine 5 MG tablet    COMPAZINE     Take 5 mg by mouth as needed        sucralfate 1 GM tablet    CARAFATE     Take 1 g by mouth 4 times daily        SUMAtriptan 100 MG tablet    IMITREX     Take 100 mg by mouth as needed        tiZANidine 4 MG tablet    ZANAFLEX     Take 4 mg by mouth as needed        * Notice:  This list has 2 medication(s) that are the same as other medications prescribed for you. Read the directions carefully, and ask your doctor or other care provider to  review them with you.

## 2018-05-31 NOTE — MR AVS SNAPSHOT
After Visit Summary   2018    Migel Stringer    MRN: [id number]           Patient Information     Date Of Birth          1950        Visit Information        Provider Department      2018 2:00 PM Yu Koehler RD [facility]        Today's Diagnoses     History of Robel-en-Y gastric bypass    -  1       Follow-ups after your visit        Future tests that were ordered for you today     Open Future Orders        Priority Expected Expires Ordered    Dobutamine Stress Echocardiogram Routine  2018    Echo Stress Test With Definity Routine  2019            Who to contact     Please call your clinic at [phone] to:    Ask questions about your health    Make or cancel appointments    Discuss your medicines    Learn about your test results    Speak to your doctor            Additional Information About Your Visit        MyChart Information     Compology is an electronic gateway that provides easy, online access to your medical records. With Compology, you can request a clinic appointment, read your test results, renew a prescription or communicate with your care team.     To sign up for [email or web address] visit the website at [email or web address]   You will be asked to enter the access code listed below, as well as some personal information. Please follow the directions to create your username and password.     Your access code is: [email or web address]  Expires: 8/15/2018  6:31 AM     Your access code will  in 90 days. If you need help or a new code, please contact your [facility] or call [phone] for assistance.        Care EveryWhere ID     This is your Care EveryWhere ID. This could be used by other organizations to access your [place] medical records  [id number]         Blood Pressure from Last 3 Encounters:   18 133/80   18 133/80    Weight from Last 3 Encounters:   18 72.7  kg (160 lb 3.2 oz)   05/31/18 73.8 kg (162 lb 11.2 oz)              Today, you had the following     No orders found for display         Today's Medication Changes          These changes are accurate as of 5/31/18  2:44 PM.  If you have any questions, ask your nurse or doctor.               These medicines have changed or have updated prescriptions.        Dose/Directions    * omeprazole 40 MG capsule   Commonly known as:  priLOSEC   This may have changed:  Another medication with the same name was added. Make sure you understand how and when to take each.   Changed by:  Jalen Ramsey MD        Dose:  40 mg   Take 40 mg by mouth every morning   Refills:  0       * omeprazole 40 MG capsule   Commonly known as:  priLOSEC   This may have changed:  You were already taking a medication with the same name, and this prescription was added. Make sure you understand how and when to take each.   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   Quantity:  60 capsule   Refills:  11       * Notice:  This list has 2 medication(s) that are the same as other medications prescribed for you. Read the directions carefully, and ask your doctor or other care provider to review them with you.         Where to get your medicines      These medications were sent to [facility] #[id number] - [place], [place] - [address] [place] [address]     Phone:  [phone]     omeprazole 40 MG capsule                Primary Care Provider Fax #    Physician No Ref-Primary [phone]       No address on file        Equal Access to Services     [facility]: Hadii coleman pruitt [place], waaxda luqadaha, qaybta westonalsusan raymundo. So [facility] [phone].    ATENCIÓN: Si david lopez  disposición servicios gratuitos de asistencia lingüística. Jasmyne tenorio [phone].    We comply with applicable federal civil rights laws and [place] laws. We do not discriminate on the basis of race, color, national origin, age, disability, sex, sexual orientation, or gender identity.            Thank you!     Thank you for choosing [facility]STROENTER[facility]Y [facility]IN[facility]  for your care. Our goal is always to provide you with excellent care. Hearing back from our patients is one way we can continue to improve our services. Please take a few minutes to complete the written survey that you may receive in the mail after your visit with us. Thank you!             Your Updated Medication List - Protect others around you: Learn how to safely use, store and throw away your medicines at www.disposemymeds.org.          This list is accurate as of 5/31/18  2:44 PM.  Always use your most recent med list.                   Brand Name Dispense Instructions for use Diagnosis    * albuterol 108 (90 Base) MCG/ACT Inhaler    PROAIR HFA/PROVENTIL HFA/VENTOLIN HFA     2 times daily        * albuterol (2.5 MG/3ML) 0.083% neb solution      Inhale 2.5 mg into the lungs 2 times daily        aspirin-acetaminophen-caffeine 250-250-65 MG per tablet    EXCEDRIN MIGRAINE     Take 1 tablet by mouth as needed for headaches        atorvastatin 20 MG tablet    LIPITOR     Take 20 mg by mouth every morning        cloNIDine 0.1 MG tablet    CATAPRES     Take 0.1 mg by mouth 2 times daily        digoxin 125 MCG tablet    LANOXIN     Take 125 mcg by mouth every morning        diphenhydrAMINE-acetaminophen  MG tablet    TYLENOL PM     Take 6 tablets by mouth At Bedtime        fentaNYL 50 mcg/hr 72 hr patch    DURAGESIC     1 patch every 72 hours        ipratropium - albuterol 0.5 mg/2.5 mg/3 mL 0.5-2.5 (3) MG/3ML neb solution    DUONEB     Inhale 3 mLs into the lungs 3 times daily        lisinopril 10 MG tablet    PRINIVIL/ZESTRIL     Take 10  mg by mouth every morning        * omeprazole 40 MG capsule    priLOSEC     Take 40 mg by mouth every morning        * omeprazole 40 MG capsule    priLOSEC    60 capsule    Take 1 capsule (40 mg) by mouth 2 times daily Take 30-60 minutes before a meal.    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)       oxyCODONE-acetaminophen  MG per tablet    PERCOCET     Take 1 tablet by mouth every 4 hours        pregabalin 100 MG capsule    LYRICA     Take 100 mg by mouth 4 times daily        prochlorperazine 5 MG tablet    COMPAZINE     Take 5 mg by mouth as needed        sucralfate 1 GM tablet    CARAFATE     Take 1 g by mouth 4 times daily        SUMAtriptan 100 MG tablet    IMITREX     Take 100 mg by mouth as needed        tiZANidine 4 MG tablet    ZANAFLEX     Take 4 mg by mouth as needed        * Notice:  This list has 4 medication(s) that are the same as other medications prescribed for you. Read the directions carefully, and ask your doctor or other care provider to review them with you.

## 2018-05-31 NOTE — PROGRESS NOTES
" New Bariatric Surgery Re-Establish Care/Referral Consultation      RE: Migel Stringer  MR#: [id number]  : 1950  NEETA: 18    Requesting provider: Navin Valle    Chief Complaint/Reason for visit: evaluation for possible reversal of gastric bypass    HISTORY OF PRESENT ILLNESS: I had the pleasure of seeing your patient, Migel Stringer, today in our bariatric surgery clinic.  As you know, Migel Stringer is 68 year old.  He has a height of 5' 9.5\", a weight of 160 lbs 3.2 oz, and calculated Body mass index is 23.32 kg/(m^2)..      He has a history of Robel-En-Y gastric bypass in . He has had chronic problems with food intolerance. He has had frequent problems with marginal ulceration. He continued to smoke until recently, but has quit smoking cigarettes. He continues to smoke cigars. 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 He continues to have food intolerance and vomiting. He has lost weight recently.     Last albumin 2018 was 3.4. He is interested in undergoing reversal of his bypass. Of note he has a known AAA which is being worked up at [facility]. He also has a recently diagnosed renal mass concerning for malignancy. This has not been biopsied or worked up yet.     Weight Loss History Reviewed with Patient 2018   How long have you been overweight? From Middle age and beyond   What is the most that you have ever weighed? 270   What is the most weight you have lost? 110   I have tried the following methods to lose weight Weight Loss Surgery   I have tried the following weight loss medications? (Check all that apply) None   Have you ever had weight loss surgery? Yes   Please select the type of weight loss surgery you had (select all that apply): gastric bypass / Robel-en-Y       Past Medical History:   Diagnosis Date     AAA (abdominal aortic aneurysm) (H)      Aortic stenosis      Asthma      Atrial fibrillation (H)      Chronic " low back pain      Chronic nausea      GERD (gastroesophageal reflux disease)      Heart murmur      Heart rate problem      History of sleep apnea      Hyperlipidemia      Hypertension      Insomnia      Migraines      Mitral regurgitation      Peripheral neuropathy      Status post gastric bypass for obesity      Ulcer, gastric, acute      Vomiting in adult        Past Surgical History:   Procedure Laterality Date     CHOLECYSTECTOMY       COLONOSCOPY       ESOPHAGOGASTRODUODENOSCOPY       GASTRIC BYPASS  2011     HERNIA REPAIR       SPINE SURGERY      hx of 6 lumbar surgeries and 1 thoracic surgery       FAMILY HISTORY:   Family History   Problem Relation Age of Onset     Skin Cancer Mother      Chronic Obstructive Pulmonary Disease Mother      DIABETES Mother      Liver Cancer Father      Breast Cancer Father      Breast Cancer Sister      No Known Problems Brother      DIABETES Maternal Grandmother      CANCER Paternal Grandmother      unspecified cancer     No Known Problems Brother      CANCER Paternal Grandfather      unspecified cancer       SOCIAL HISTORY:   Social History Questions Reviewed With Patient 5/31/2018   Which best describes your employment status (select all that apply) I am laid off   If you work, what is your occupation?    Which best describes your marital status:    Do you have children? Yes   Who do you have in your support network that can be available to help you for the first 2 weeks after surgery? my wife   Who can you count on for support throughout your weight loss surgery journey? my wife   Can you afford 3 meals a day?  Yes   Can you afford 50-60 dollars a month for vitamins? No       PSYCHOLOGICAL HISTORY:   Psychological History Reviewed With Patient 5/31/2018   Have you ever attempted suicide? Never.   Have you had thoughts of suicide in the past year? No   Have you ever been hospitalized for mental illness or a suicide attempt? Never.   Do you have a history  "of chronic pain? Yes   Have you ever been diagnosed with fibromyalgia? No   Are you currently seeing a therapist or counselor?  No   Are you currently seeing a psychiatrist? No       ROS    MEDICATIONS:  Current Outpatient Prescriptions   Medication     albuterol (2.5 MG/3ML) 0.083% neb solution     albuterol (PROAIR HFA/PROVENTIL HFA/VENTOLIN HFA) 108 (90 Base) MCG/ACT Inhaler     aspirin-acetaminophen-caffeine (EXCEDRIN MIGRAINE) 250-250-65 MG per tablet     atorvastatin (LIPITOR) 20 MG tablet     cloNIDine (CATAPRES) 0.1 MG tablet     digoxin (LANOXIN) 125 MCG tablet     diphenhydrAMINE-acetaminophen (TYLENOL PM)  MG tablet     fentaNYL (DURAGESIC) 50 mcg/hr 72 hr patch     ipratropium - albuterol 0.5 mg/2.5 mg/3 mL (DUONEB) 0.5-2.5 (3) MG/3ML neb solution     lisinopril (PRINIVIL/ZESTRIL) 10 MG tablet     omeprazole (PRILOSEC) 40 MG capsule     oxyCODONE-acetaminophen (PERCOCET)  MG per tablet     pregabalin (LYRICA) 100 MG capsule     prochlorperazine (COMPAZINE) 5 MG tablet     sucralfate (CARAFATE) 1 GM tablet     SUMAtriptan (IMITREX) 100 MG tablet     tiZANidine (ZANAFLEX) 4 MG tablet     No current facility-administered medications for this visit.        ALLERGIES:  Allergies   Allergen Reactions     Heparin      Other reaction(s): Thrombocytopenia  Platelets dropped precipitously 12/29/17 during hospitalization. HIT antibodies were not checked       /80  Pulse 95  Temp 97.5  F (36.4  C) (Oral)  Ht 1.765 m (5' 9.5\")  Wt 72.7 kg (160 lb 3.2 oz)  SpO2 100%  BMI 23.32 kg/m2    PHYSICAL EXAM:  Neurological: A & O x 3  Eyes: PERRL  ENT: mucous membranes moist  Skin: warm and dry  Respiratory: Respirations unlabored  CV: Regular  Abdomen: Soft, nondistended. Minimal epigastric tenderness. Lap scars and midline laparotomy incision (spine surgery)    In summary, Migel Stringer is a 68 year old male who has a history of Gastric Bypass who is interested in reversal due to food " intolerance and ulceration. He has had recent dilation of his strictured GJ anastomosis but still has very frequent vomiting. He is undergoing evaluation of a AAA and new renal mass. Also found to have cardiac murmur and echocardiogram is pending.     PLAN:  Increase omeprazole to BID      Plan for EGD with stent placement ([facility]), will be done soon in [facility] by Dr Ramsey.     Anticipate this will help with nausea/vomiting/food intolerance and help his nutrition      Primary care/urology to follow up for renal mass workup prior to any surgical intervention.    Workup of AAA pending in June with vascular surgeon from [facility] per PAC clinic.    Will need to absolutely quit smoking cigars prior to any possible surgery    Sincerely,    Jalen Ramsey MD  Surgery  [phone] (hospital )  [phone] (clinic nurses)

## 2018-06-01 ENCOUNTER — TELEPHONE (OUTPATIENT)
Dept: SURGERY | Facility: CLINIC | Age: 68
End: 2018-06-01

## 2018-06-01 NOTE — TELEPHONE ENCOUNTER
Prior Authorization Approval    Authorization Effective Date: 3/3/2018  Authorization Expiration Date: 6/1/2019  Medication: OMEPRAZOLE 40 MG CAP -PA approved  Approved Dose/Quantity:   Reference #:     Insurance Company: [employer] [phone] Fax [phone]  Expected CoPay:       CoPay Card Available:      Foundation Assistance Needed:    Which Pharmacy is filling the prescription (Not needed for infusion/clinic administered): [facility] #[id number] - [place], [place] - [address]  Pharmacy Notified: Yes  Patient Notified: No - Pharmacy will notify patient when ready

## 2018-06-01 NOTE — TELEPHONE ENCOUNTER
Central Prior Authorization Team   Phone: [phone]      PA Initiation    Medication: OMEPRAZOLE 40 MG CAP -PA initiated  Insurance Company: [employer] - Phone [phone] Fax [phone]  Pharmacy Filling the Rx: [id number] - [place], [place] - [address]  Filling Pharmacy Phone: [phone]  Filling Pharmacy Fax:    Start Date: 6/1/2018

## 2018-06-11 ENCOUNTER — HOSPITAL ENCOUNTER (OUTPATIENT)
Dept: CARDIOLOGY | Facility: CLINIC | Age: 68
Discharge: HOME | End: 2018-06-11
Attending: NURSE PRACTITIONER | Admitting: NURSE PRACTITIONER
Payer: MEDICARE

## 2018-06-11 ENCOUNTER — HOSPITAL ENCOUNTER (OUTPATIENT)
Dept: NUCLEAR MEDICINE | Facility: CLINIC | Age: 68
Setting detail: NUCLEAR MEDICINE
Discharge: HOME | End: 2018-06-11
Attending: NURSE PRACTITIONER | Admitting: NURSE PRACTITIONER
Payer: MEDICARE

## 2018-06-11 DIAGNOSIS — Z87.891 HISTORY OF SMOKING: ICD-10-CM

## 2018-06-11 DIAGNOSIS — Z01.818 PREOP EXAMINATION: ICD-10-CM

## 2018-06-11 DIAGNOSIS — I35.0 MODERATE AORTIC STENOSIS: ICD-10-CM

## 2018-06-11 DIAGNOSIS — R06.09 EXERTIONAL DYSPNEA: ICD-10-CM

## 2018-06-11 DIAGNOSIS — R60.0 PERIPHERAL EDEMA: ICD-10-CM

## 2018-06-11 DIAGNOSIS — I34.0 MITRAL VALVE INSUFFICIENCY, UNSPECIFIED ETIOLOGY: ICD-10-CM

## 2018-06-11 DIAGNOSIS — Z98.84 H/O GASTRIC BYPASS: ICD-10-CM

## 2018-06-11 PROCEDURE — 34300033 ZZH RX 343: Performed by: NURSE PRACTITIONER

## 2018-06-11 PROCEDURE — 93018 CV STRESS TEST I&R ONLY: CPT | Performed by: INTERNAL MEDICINE

## 2018-06-11 PROCEDURE — 93017 CV STRESS TEST TRACING ONLY: CPT

## 2018-06-11 PROCEDURE — 93306 TTE W/DOPPLER COMPLETE: CPT

## 2018-06-11 PROCEDURE — 78452 HT MUSCLE IMAGE SPECT MULT: CPT | Mod: 26 | Performed by: INTERNAL MEDICINE

## 2018-06-11 PROCEDURE — 25000128 H RX IP 250 OP 636: Performed by: NURSE PRACTITIONER

## 2018-06-11 PROCEDURE — 78452 HT MUSCLE IMAGE SPECT MULT: CPT

## 2018-06-11 PROCEDURE — 93306 TTE W/DOPPLER COMPLETE: CPT | Mod: 26 | Performed by: INTERNAL MEDICINE

## 2018-06-11 PROCEDURE — A9502 TC99M TETROFOSMIN: HCPCS | Performed by: NURSE PRACTITIONER

## 2018-06-11 PROCEDURE — 93016 CV STRESS TEST SUPVJ ONLY: CPT | Performed by: INTERNAL MEDICINE

## 2018-06-11 RX ORDER — REGADENOSON 0.08 MG/ML
0.4 INJECTION, SOLUTION INTRAVENOUS ONCE
Status: COMPLETED | OUTPATIENT
Start: 2018-06-11 | End: 2018-06-11

## 2018-06-11 RX ADMIN — REGADENOSON 0.4 MG: 0.08 INJECTION, SOLUTION INTRAVENOUS at 12:53

## 2018-06-11 RX ADMIN — TETROFOSMIN 33.1 MCI.: 1.38 INJECTION, POWDER, LYOPHILIZED, FOR SOLUTION INTRAVENOUS at 13:15

## 2018-06-11 RX ADMIN — TETROFOSMIN 11 MCI.: 1.38 INJECTION, POWDER, LYOPHILIZED, FOR SOLUTION INTRAVENOUS at 11:30

## 2018-06-12 ENCOUNTER — CARE COORDINATION (OUTPATIENT)
Dept: SURGERY | Facility: CLINIC | Age: 68
End: 2018-06-12

## 2018-06-12 ENCOUNTER — TELEPHONE (OUTPATIENT)
Dept: SURGERY | Facility: CLINIC | Age: 68
End: 2018-06-12

## 2018-06-12 DIAGNOSIS — I35.0 SEVERE AORTIC STENOSIS: Primary | ICD-10-CM

## 2018-06-12 NOTE — PROGRESS NOTES
Patient contacted by Dr. Jules with results.  See note for plan.      Jyothi Whitmore DNP, RN, ANP-C

## 2018-06-12 NOTE — PROGRESS NOTES
Chaitanya Umana 19 minutes ago (3:11 PM)                 [facility]     Phone Message     May a detailed message be left on voicemail: yes     Reason for Call: Other: Pt's wife stated pt received call from Haydee Whitmore about test results, however wife Ghislaine wants the call directed to her. Please have Jyothi call wife Niles at [phone].       Action Taken: Message routed to:  [facility] ([facility]): Weight mgmt                  Documentation                          Ghislaine Stringer [phone]  Chaitanya Umana              Called patient wife back. Advised her that we cannot discuss any medical information with her as there is no signed consent form in the patient chart. She states she is concerned because her  told her that he was advised he needs to have open heart surgery based on his lab results. This is not noted in the chart. She requested to speak to the physician that discussed the results with her . Message sent to PAC team to advise.

## 2018-06-12 NOTE — TELEPHONE ENCOUNTER
Echocardiogram and stress test results received today.  Reviewed with Dr. Jules regarding change from previous finding of moderate aortic stenosis to now severe aortic stenosis.  Dr. Jules called and discussed with patient initially and then called and discussed with wife per her request as she was at work when the patient was called.  Dr. Jules notified the cardiac service and they will now contact the patient to get scheduled for evaluation for possible aortic valve replacement. I have placed a referral order.        Jyothi Whitmore DNP, RN, APRN

## 2018-06-13 ENCOUNTER — TELEPHONE (OUTPATIENT)
Dept: CARDIOLOGY | Facility: CLINIC | Age: 68
End: 2018-06-13

## 2018-06-13 NOTE — TELEPHONE ENCOUNTER
Called and left message on wife's michell phone.  TAVR team was contacted about Mr Siomara's aortic valve being severe on recent TTE as part of screening before surgery.  I reviewed his chart, and he has known AAA (measuring 5.1cm in 2017), renal mass seen on CT, prior gastric bypass few years ago with subsequent N/V and weight loss and was due to have reversal of gastric bypass.    I explained to wife on message that we'd like to see him in TAVR clinic to get full picture of what's being done for renal mass, AAA since those workups are at outside institutions.  And we would like to meet him to get a sense of how symptomatic he is from his aortic valve.    TTE was reviewed and does show calcified leaflets that open with velocities up to 3.5m/sec.      Will have our TAVR nurse coordinator set up appt in TAVR clinic with CT TAVR before clinic day to see what his aortic valve (calcium score, size) and vasculature are since he has extensive smoking hx as well.    Salvador Fuentes  Cardiology fellow  Pager [phone]

## 2018-06-21 DIAGNOSIS — R06.02 SOB (SHORTNESS OF BREATH): ICD-10-CM

## 2018-06-21 DIAGNOSIS — R09.89 CAROTID BRUIT, UNSPECIFIED LATERALITY: ICD-10-CM

## 2018-06-21 DIAGNOSIS — I35.0 SEVERE AORTIC STENOSIS: Primary | ICD-10-CM

## 2018-06-21 NOTE — PROGRESS NOTES
Date: 6/21/2018    Time of Call: 4:34 PM     Diagnosis:  Severe aortic stenosis     [ TORB ] Ordering provider: Wale Smyth MD  Order:   1.  CBC and CMP  2.  EKG  3.  PFT's  4.  Carotid US  5.  TAVR CT  6.  Angiogram scheduling order     Order received by: Nleli Mccallum RN     Follow-up/additional notes:   Ordered for upcoming TAVR visit.

## 2018-07-09 ENCOUNTER — TELEPHONE (OUTPATIENT)
Dept: SURGERY | Facility: CLINIC | Age: 68
End: 2018-07-09

## 2018-07-09 NOTE — TELEPHONE ENCOUNTER
[facility] Call Center    Phone Message    May a detailed message be left on voicemail: yes    Reason for Call: Other: Geneva from [facility] called & said that pt had a Stent placed on 6/20/18 in the ER in [place] by Dr. Ramsey. Geneva would like to know what they should do w/ the pt moving forward. Geneva also said that pt was to f/u w/ Dr. Ramsey in 1 week from ED Discharge, but feels that isn't soon enough.      Action Taken: Message routed to:  [facility] ([facility]): Bariatric

## 2018-07-16 ENCOUNTER — TRANSFERRED RECORDS (OUTPATIENT)
Dept: HEALTH INFORMATION MANAGEMENT | Facility: CLINIC | Age: 68
End: 2018-07-16

## 2018-07-16 DIAGNOSIS — I35.0 SEVERE AORTIC STENOSIS: Primary | ICD-10-CM

## 2018-07-19 ENCOUNTER — HOSPITAL ENCOUNTER (OUTPATIENT)
Dept: CT IMAGING | Facility: CLINIC | Age: 68
Discharge: HOME | End: 2018-07-19
Attending: INTERNAL MEDICINE | Admitting: INTERNAL MEDICINE
Payer: MEDICARE

## 2018-07-19 ENCOUNTER — OFFICE VISIT (OUTPATIENT)
Dept: CARDIOLOGY | Facility: CLINIC | Age: 68
End: 2018-07-19
Attending: INTERNAL MEDICINE
Payer: MEDICARE

## 2018-07-19 ENCOUNTER — HOSPITAL ENCOUNTER (OUTPATIENT)
Dept: ULTRASOUND IMAGING | Facility: CLINIC | Age: 68
End: 2018-07-19
Attending: INTERNAL MEDICINE
Payer: MEDICARE

## 2018-07-19 ENCOUNTER — OFFICE VISIT (OUTPATIENT)
Dept: CARDIOLOGY | Facility: CLINIC | Age: 68
End: 2018-07-19
Attending: SURGERY
Payer: MEDICARE

## 2018-07-19 VITALS
HEIGHT: 70 IN | DIASTOLIC BLOOD PRESSURE: 71 MMHG | BODY MASS INDEX: 23.74 KG/M2 | OXYGEN SATURATION: 95 % | HEART RATE: 57 BPM | WEIGHT: 165.8 LBS | SYSTOLIC BLOOD PRESSURE: 129 MMHG

## 2018-07-19 VITALS
HEART RATE: 57 BPM | HEIGHT: 70 IN | OXYGEN SATURATION: 95 % | DIASTOLIC BLOOD PRESSURE: 71 MMHG | WEIGHT: 159.19 LBS | SYSTOLIC BLOOD PRESSURE: 129 MMHG | BODY MASS INDEX: 22.79 KG/M2

## 2018-07-19 VITALS
DIASTOLIC BLOOD PRESSURE: 71 MMHG | HEART RATE: 57 BPM | SYSTOLIC BLOOD PRESSURE: 129 MMHG | BODY MASS INDEX: 22.79 KG/M2 | HEIGHT: 70 IN | WEIGHT: 159.19 LBS | OXYGEN SATURATION: 95 %

## 2018-07-19 DIAGNOSIS — R09.89 CAROTID BRUIT, UNSPECIFIED LATERALITY: ICD-10-CM

## 2018-07-19 DIAGNOSIS — I35.0 SEVERE AORTIC STENOSIS: ICD-10-CM

## 2018-07-19 DIAGNOSIS — I25.10 CORONARY ARTERY DISEASE, ANGINA PRESENCE UNSPECIFIED, UNSPECIFIED VESSEL OR LESION TYPE, UNSPECIFIED WHETHER NATIVE OR TRANSPLANTED HEART: Primary | ICD-10-CM

## 2018-07-19 DIAGNOSIS — I35.0 AORTIC STENOSIS: Primary | ICD-10-CM

## 2018-07-19 DIAGNOSIS — Z95.2 S/P TAVR (TRANSCATHETER AORTIC VALVE REPLACEMENT): Primary | ICD-10-CM

## 2018-07-19 DIAGNOSIS — I35.0 NONRHEUMATIC AORTIC VALVE STENOSIS: Primary | ICD-10-CM

## 2018-07-19 LAB
ALBUMIN SERPL-MCNC: 3.4 G/DL (ref 3.4–5)
ALP SERPL-CCNC: 73 U/L (ref 40–150)
ALT SERPL W P-5'-P-CCNC: 21 U/L (ref 0–70)
ANION GAP SERPL CALCULATED.3IONS-SCNC: 4 MMOL/L (ref 3–14)
AST SERPL W P-5'-P-CCNC: 14 U/L (ref 0–45)
BILIRUB SERPL-MCNC: 0.3 MG/DL (ref 0.2–1.3)
BUN SERPL-MCNC: 16 MG/DL (ref 7–30)
CALCIUM SERPL-MCNC: 8.1 MG/DL (ref 8.5–10.1)
CHLORIDE SERPL-SCNC: 109 MMOL/L (ref 94–109)
CO2 SERPL-SCNC: 28 MMOL/L (ref 20–32)
CREAT SERPL-MCNC: 0.86 MG/DL (ref 0.66–1.25)
ERYTHROCYTE [DISTWIDTH] IN BLOOD BY AUTOMATED COUNT: 19.6 % (ref 10–15)
GFR SERPL CREATININE-BSD FRML MDRD: 88 ML/MIN/1.7M2
GLUCOSE SERPL-MCNC: 114 MG/DL (ref 70–99)
HCT VFR BLD AUTO: 39 % (ref 40–53)
HGB BLD-MCNC: 12.2 G/DL (ref 13.3–17.7)
MCH RBC QN AUTO: 28 PG (ref 26.5–33)
MCHC RBC AUTO-ENTMCNC: 31.3 G/DL (ref 31.5–36.5)
MCV RBC AUTO: 89 FL (ref 78–100)
PLATELET # BLD AUTO: 236 10E9/L (ref 150–450)
POTASSIUM SERPL-SCNC: 4.5 MMOL/L (ref 3.4–5.3)
PROT SERPL-MCNC: 6.3 G/DL (ref 6.8–8.8)
RBC # BLD AUTO: 4.36 10E12/L (ref 4.4–5.9)
SODIUM SERPL-SCNC: 141 MMOL/L (ref 133–144)
WBC # BLD AUTO: 6.8 10E9/L (ref 4–11)

## 2018-07-19 PROCEDURE — 40000556 ZZH STATISTIC PERIPHERAL IV START W US GUIDANCE

## 2018-07-19 PROCEDURE — 99205 OFFICE O/P NEW HI 60 MIN: CPT | Mod: GC | Performed by: INTERNAL MEDICINE

## 2018-07-19 PROCEDURE — 25000128 H RX IP 250 OP 636: Performed by: INTERNAL MEDICINE

## 2018-07-19 PROCEDURE — 71275 CT ANGIOGRAPHY CHEST: CPT | Mod: 26 | Performed by: INTERNAL MEDICINE

## 2018-07-19 PROCEDURE — 74174 CTA ABD&PLVS W/CONTRAST: CPT | Mod: 26 | Performed by: INTERNAL MEDICINE

## 2018-07-19 PROCEDURE — 40000556 ZZH STATISTIC PERIPHERAL IV START W US GUIDANCE: Mod: ZF

## 2018-07-19 PROCEDURE — 93880 EXTRACRANIAL BILAT STUDY: CPT

## 2018-07-19 PROCEDURE — 71275 CT ANGIOGRAPHY CHEST: CPT

## 2018-07-19 PROCEDURE — 80053 COMPREHEN METABOLIC PANEL: CPT | Performed by: INTERNAL MEDICINE

## 2018-07-19 PROCEDURE — G0463 HOSPITAL OUTPT CLINIC VISIT: HCPCS | Mod: 25,ZF

## 2018-07-19 PROCEDURE — 36415 COLL VENOUS BLD VENIPUNCTURE: CPT | Performed by: INTERNAL MEDICINE

## 2018-07-19 PROCEDURE — 85027 COMPLETE CBC AUTOMATED: CPT | Performed by: INTERNAL MEDICINE

## 2018-07-19 RX ORDER — IOPAMIDOL 755 MG/ML
120 INJECTION, SOLUTION INTRAVASCULAR ONCE
Status: COMPLETED | OUTPATIENT
Start: 2018-07-19 | End: 2018-07-19

## 2018-07-19 RX ADMIN — IOPAMIDOL 120 ML: 755 INJECTION, SOLUTION INTRAVENOUS at 11:41

## 2018-07-19 ASSESSMENT — ENCOUNTER SYMPTOMS
MUSCLE CRAMPS: 1
JOINT SWELLING: 0
RECTAL PAIN: 0
STIFFNESS: 1
JAUNDICE: 0
COUGH: 1
LEG PAIN: 1
BLOOD IN STOOL: 0
SPEECH CHANGE: 0
MUSCLE WEAKNESS: 1
PARALYSIS: 0
WHEEZING: 1
BLOATING: 0
MEMORY LOSS: 0
NECK PAIN: 1
LIGHT-HEADEDNESS: 1
INSOMNIA: 1
TINGLING: 1
DYSURIA: 1
SPUTUM PRODUCTION: 1
PALPITATIONS: 1
HEMATURIA: 0
DECREASED CONCENTRATION: 1
HYPERTENSION: 1
MYALGIAS: 1
SNORES LOUDLY: 0
WEAKNESS: 1
HEADACHES: 1
FLANK PAIN: 0
HYPOTENSION: 0
DIFFICULTY URINATING: 1
HEMOPTYSIS: 0
POSTURAL DYSPNEA: 1
ARTHRALGIAS: 1
DEPRESSION: 1
LOSS OF CONSCIOUSNESS: 0
NUMBNESS: 1
TREMORS: 0
NAUSEA: 1
DIZZINESS: 1
EXERCISE INTOLERANCE: 1
ORTHOPNEA: 0
CONSTIPATION: 0
VOMITING: 1
SHORTNESS OF BREATH: 1
BOWEL INCONTINENCE: 0
SYNCOPE: 0
NERVOUS/ANXIOUS: 1
HEARTBURN: 1
PANIC: 0
ABDOMINAL PAIN: 1
SLEEP DISTURBANCES DUE TO BREATHING: 1
DISTURBANCES IN COORDINATION: 1
COUGH DISTURBING SLEEP: 0
BACK PAIN: 1
DYSPNEA ON EXERTION: 1
DIARRHEA: 0
SEIZURES: 0

## 2018-07-19 ASSESSMENT — PATIENT HEALTH QUESTIONNAIRE - PHQ9
SUM OF ALL RESPONSES TO PHQ QUESTIONS 1-9: 15
SUM OF ALL RESPONSES TO PHQ QUESTIONS 1-9: 15
10. IF YOU CHECKED OFF ANY PROBLEMS, HOW DIFFICULT HAVE THESE PROBLEMS MADE IT FOR YOU TO DO YOUR WORK, TAKE CARE OF THINGS AT HOME, OR GET ALONG WITH OTHER PEOPLE: SOMEWHAT DIFFICULT

## 2018-07-19 ASSESSMENT — PAIN SCALES - GENERAL
PAINLEVEL: NO PAIN (0)

## 2018-07-19 NOTE — MR AVS SNAPSHOT
After Visit Summary   7/19/2018    Migel Stringer    MRN: [id number]           Patient Information     Date Of Birth          1950        Visit Information        Provider Department      7/19/2018 2:45 PM Nurse, [facility]        Today's Diagnoses     S/P TAVR (transcatheter aortic valve replacement)    -  1       Follow-ups after your visit        Your next 10 appointments already scheduled     Jul 30, 2018 10:00 AM CDT   Ech Dobutamine Stress Test with UUEDOBR1   [facility], [place],  Echocardiography ([facility], [facility])    [address]  [place] [address] [phone]           1.  Please bring or wear a comfortable two-piece outfit and walking shoes. 2.  Stop eating 3 hours before the test. You may drink water or juice. 3.  Stop all caffeine 12 hours before the test. This includes coffee, tea, soda pop, chocolate and certain medicines (such as Anacin and Excederin). Also avoid decaf coffee and tea, as these contain small amounts of caffeine. 4.  No alcohol, smoking or use of other tobacco products for 12 hours before the test. 5.  Refer to your provider instructions to see if you need to stop any medications (such as beta-blockers or nitrates) for this test. 6.  For patients with diabetes: -   If you take insulin, call your diabetes care team. Ask if you should take a   dose the morning of your test. -   If you take diabetes medicine by mouth, don't take it on the morning of your test. Bring it with you to take after the test.  (If you have questions, call your diabetes care team) 7.  When you arrive, please tell us if: -   You have diabetes. -   You have taken Viagra, Cialis or Levitra in the past 48 hours. 8.  For any questions that cannot be answered, please contact the ordering physician 9.  Please do not wear perfumes or scented lotions on the day of your exam.            Jul 30, 2018 11:00 AM CDT   LAB with JAZ LAB GOLD WAITING    [facility]    [address] [address]              Please do not eat 10-12 hours before your appointment if you are coming in fasting for labs on lipids, cholesterol, or glucose (sugar). This does not apply to pregnant women. Water, hot tea and black coffee (with nothing added) are okay. Do not drink other fluids, diet soda or chew gum.            Jul 30, 2018 11:30 AM CDT   Procedure 1.5 hr with U2A ROOM 17   [address] [facility] ([facility])    [address] [address]               Jul 30, 2018  1:00 PM CDT   Cath 90 Minute with UUHCVR3   [facility]  [facility] ([facility])    [address] [address]   [phone]              Future tests that were ordered for you today     Open Future Orders        Priority Expected Expires Ordered    Cardiac Cath: Coronary Angiography Adult Order Routine 7/26/2018 10/24/2018 7/19/2018    Dobutamine Stress Echocardiogram Routine 7/26/2018 10/24/2018 7/19/2018            Who to contact     If you have questions or need follow up information about today's clinic visit or your schedule please contact [facility] directly at [phone].  Normal or non-critical lab and imaging results will be communicated to you by [employer], letter or phone within 4 business days after the clinic has received the results. If you do not hear from us within 7 days, please contact the clinic through [employer] or phone. If you have a critical or abnormal lab result, we will notify you by phone as soon as possible.  Submit refill requests through [employer] or call your pharmacy and they will forward the refill request to us. Please allow 3 business days for your refill to be completed.          Additional Information About Your Visit        [employer] Information     [employer] gives you  "secure access to your electronic health record. If you see a primary care provider, you can also send messages to your care team and make appointments. If you have questions, please call your primary care clinic.  If you do not have a primary care provider, please call [phone] and they will assist you.        Care EveryWhere ID     This is your Care EveryWhere ID. This could be used by other organizations to access your [place] medical records  [id number]        Your Vitals Were     Pulse Height Pulse Oximetry BMI (Body Mass Index)          57 1.765 m (5' 9.5\") 95% 24.13 kg/m2         Blood Pressure from Last 3 Encounters:   07/19/18 129/71   07/19/18 129/71   07/19/18 129/71    Weight from Last 3 Encounters:   07/19/18 72.2 kg (159 lb 3 oz)   07/19/18 72.2 kg (159 lb 3 oz)   07/19/18 75.2 kg (165 lb 12.8 oz)              Today, you had the following     No orders found for display       Primary Care Provider Fax #    Physician No Ref-Primary [phone]       No address on file        Equal Access to Services     [facility] : Hadii coleman Newell, wagustaboda ade, qaybta kaalmaanaya betancur, susan manuel . So [facility] [phone].    ATENCIÓN: Si habla español, tiene a alvarez disposición servicios gratuitos de asistencia lingüística. Llame al [phone].    We comply with applicable federal civil rights laws and [place] laws. We do not discriminate on the basis of race, color, national origin, age, disability, sex, sexual orientation, or gender identity.            Thank you!     Thank you for choosing [facility]  for your care. Our goal is always to provide you with excellent care. Hearing back from our patients is one way we can continue to improve our services. Please take a few minutes to complete the written survey that you may receive in the mail after your visit with us. Thank you!             Your Updated Medication List - Protect others around you: Learn " how to safely use, store and throw away your medicines at www.disposemymeds.org.          This list is accurate as of 7/19/18 11:59 PM.  Always use your most recent med list.                   Brand Name Dispense Instructions for use Diagnosis    * albuterol 108 (90 Base) MCG/ACT Inhaler    PROAIR HFA/PROVENTIL HFA/VENTOLIN HFA     Inhale 1-2 puffs into the lungs 2 times daily        * albuterol (2.5 MG/3ML) 0.083% neb solution      Inhale 2.5 mg into the lungs 2 times daily        aspirin-acetaminophen-caffeine 250-250-65 MG per tablet    EXCEDRIN MIGRAINE     Take 1 tablet by mouth as needed for headaches        atorvastatin 20 MG tablet    LIPITOR     Take 20 mg by mouth every morning        cloNIDine 0.1 MG tablet    CATAPRES     Take 0.1 mg by mouth 2 times daily        digoxin 125 MCG tablet    LANOXIN     Take 125 mcg by mouth every morning        diphenhydrAMINE-acetaminophen  MG tablet    TYLENOL PM     Take 6 tablets by mouth At Bedtime        fentaNYL 50 mcg/hr 72 hr patch    DURAGESIC     Place 1 patch onto the skin every 72 hours        ipratropium - albuterol 0.5 mg/2.5 mg/3 mL 0.5-2.5 (3) MG/3ML neb solution    DUONEB     Inhale 3 mLs into the lungs 3 times daily        lisinopril 10 MG tablet    PRINIVIL/ZESTRIL     Take 10 mg by mouth every morning        omeprazole 40 MG capsule    priLOSEC    60 capsule    Take 1 capsule (40 mg) by mouth 2 times daily Take 30-60 minutes before a meal.    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)       oxyCODONE-acetaminophen  MG per tablet    PERCOCET     Take 1 tablet by mouth every 4 hours        pregabalin 100 MG capsule    LYRICA     Take 100 mg by mouth 4 times daily        prochlorperazine 5 MG tablet    COMPAZINE     Take 5 mg by mouth every 6 hours as needed        sucralfate 1 GM tablet    CARAFATE     Take 1 g by mouth 4 times daily        SUMAtriptan 100 MG tablet    IMITREX     Take  100 mg by mouth as needed        tiZANidine 4 MG tablet    ZANAFLEX     Take 4 mg by mouth as needed        * Notice:  This list has 2 medication(s) that are the same as other medications prescribed for you. Read the directions carefully, and ask your doctor or other care provider to review them with you.

## 2018-07-19 NOTE — MR AVS SNAPSHOT
After Visit Summary   7/19/2018    Migel Stringer    MRN: [id number]           Patient Information     Date Of Birth          1950        Visit Information        Provider Department      7/19/2018 3:15 PM Rafael Neal MD [facility]         Follow-ups after your visit        Your next 10 appointments already scheduled     Jul 30, 2018 10:00 AM CDT   Ech Dobutamine Stress Test with UUEDOBR1   [facility]  [facility] ([facility], [facility] [place])    [address] [id number] [phone]           1.  Please bring or wear a comfortable two-piece outfit and walking shoes. 2.  Stop eating 3 hours before the test. You may drink water or juice. 3.  Stop all caffeine 12 hours before the test. This includes coffee, tea, soda pop, chocolate and certain medicines (such as Anacin and Excederin). Also avoid decaf coffee and tea, as these contain small amounts of caffeine. 4.  No alcohol, smoking or use of other tobacco products for 12 hours before the test. 5.  Refer to your provider instructions to see if you need to stop any medications (such as beta-blockers or nitrates) for this test. 6.  For patients with diabetes: -   If you take insulin, call your diabetes care team. Ask if you should take a   dose the morning of your test. -   If you take diabetes medicine by mouth, don't take it on the morning of your test. Bring it with you to take after the test.  (If you have questions, call your diabetes care team) 7.  When you arrive, please tell us if: -   You have diabetes. -   You have taken Viagra, Cialis or Levitra in the past 48 hours. 8.  For any questions that cannot be answered, please contact the ordering physician 9.  Please do not wear perfumes or scented lotions on the day of your exam.            Jul 30, 2018 11:00 AM CDT   LAB with UU LAB GOLD WAITING   [facility]     [address] [address]              Please do not eat 10-12 hours before your appointment if you are coming in fasting for labs on lipids, cholesterol, or glucose (sugar). This does not apply to pregnant women. Water, hot tea and black coffee (with nothing added) are okay. Do not drink other fluids, diet soda or chew gum.            Jul 30, 2018 11:30 AM CDT   Procedure 1.5 hr with U2A ROOM 17   [address] [facility] ([facility])    [address] [address]               Jul 30, 2018  1:00 PM CDT   Cath 90 Minute with UUHCVR3   [facility] [place],  Heart Cath Lab ([facility])    [address] [address] [phone]              Future tests that were ordered for you today     Open Future Orders        Priority Expected Expires Ordered    Cardiac Cath: Coronary Angiography Adult Order Routine 7/26/2018 10/24/2018 7/19/2018    Dobutamine Stress Echocardiogram Routine 7/26/2018 10/24/2018 7/19/2018            Who to contact     If you have questions or need follow up information about today's clinic visit or your schedule please contact [facility] directly at [phone].  Normal or non-critical lab and imaging results will be communicated to you by [employer], letter or phone within 4 business days after the clinic has received the results. If you do not hear from us within 7 days, please contact the clinic through [employer] or phone. If you have a critical or abnormal lab result, we will notify you by phone as soon as possible.  Submit refill requests through [employer] or call your pharmacy and they will forward the refill request to us. Please allow 3 business days for your refill to be completed.          Additional Information About Your Visit        [employer] Information     [employer] gives you secure access to your electronic health record. If you see a primary care provider, you  "can also send messages to your care team and make appointments. If you have questions, please call your primary care clinic.  If you do not have a primary care provider, please call [phone] and they will assist you.        Care EveryWhere ID     This is your Care EveryWhere ID. This could be used by other organizations to access your [place] medical records  [id number]        Your Vitals Were     Pulse Height Pulse Oximetry BMI (Body Mass Index)          57 1.765 m (5' 9.5\") 95% 23.17 kg/m2         Blood Pressure from Last 3 Encounters:   07/19/18 129/71   07/19/18 129/71   07/19/18 129/71    Weight from Last 3 Encounters:   07/19/18 72.2 kg (159 lb 3 oz)   07/19/18 72.2 kg (159 lb 3 oz)   07/19/18 75.2 kg (165 lb 12.8 oz)              Today, you had the following     No orders found for display       Primary Care Provider Fax #    Physician No Ref-Primary [phone]       No address on file        Equal Access to Services     [facility]: Hadii aad ku hadasho Sojavier, waaxda luqadaha, qaybta kaalmada adeegyaanaya, susan manuel . So [facility] [phone].    ATENCIÓN: Si habla español, tiene a alvarez disposición servicios gratuitos de asistencia lingüística. Llame al [phone].    We comply with applicable federal civil rights laws and [place] laws. We do not discriminate on the basis of race, color, national origin, age, disability, sex, sexual orientation, or gender identity.            Thank you!     Thank you for choosing [facility]  for your care. Our goal is always to provide you with excellent care. Hearing back from our patients is one way we can continue to improve our services. Please take a few minutes to complete the written survey that you may receive in the mail after your visit with us. Thank you!             Your Updated Medication List - Protect others around you: Learn how to safely use, store and throw away your medicines at www.disposemymeds.org.        "   This list is accurate as of 7/19/18 11:59 PM.  Always use your most recent med list.                   Brand Name Dispense Instructions for use Diagnosis    * albuterol 108 (90 Base) MCG/ACT Inhaler    PROAIR HFA/PROVENTIL HFA/VENTOLIN HFA     Inhale 1-2 puffs into the lungs 2 times daily        * albuterol (2.5 MG/3ML) 0.083% neb solution      Inhale 2.5 mg into the lungs 2 times daily        aspirin-acetaminophen-caffeine 250-250-65 MG per tablet    EXCEDRIN MIGRAINE     Take 1 tablet by mouth as needed for headaches        atorvastatin 20 MG tablet    LIPITOR     Take 20 mg by mouth every morning        cloNIDine 0.1 MG tablet    CATAPRES     Take 0.1 mg by mouth 2 times daily        digoxin 125 MCG tablet    LANOXIN     Take 125 mcg by mouth every morning        diphenhydrAMINE-acetaminophen  MG tablet    TYLENOL PM     Take 6 tablets by mouth At Bedtime        fentaNYL 50 mcg/hr 72 hr patch    DURAGESIC     Place 1 patch onto the skin every 72 hours        ipratropium - albuterol 0.5 mg/2.5 mg/3 mL 0.5-2.5 (3) MG/3ML neb solution    DUONEB     Inhale 3 mLs into the lungs 3 times daily        lisinopril 10 MG tablet    PRINIVIL/ZESTRIL     Take 10 mg by mouth every morning        omeprazole 40 MG capsule    priLOSEC    60 capsule    Take 1 capsule (40 mg) by mouth 2 times daily Take 30-60 minutes before a meal.    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)       oxyCODONE-acetaminophen  MG per tablet    PERCOCET     Take 1 tablet by mouth every 4 hours        pregabalin 100 MG capsule    LYRICA     Take 100 mg by mouth 4 times daily        prochlorperazine 5 MG tablet    COMPAZINE     Take 5 mg by mouth every 6 hours as needed        sucralfate 1 GM tablet    CARAFATE     Take 1 g by mouth 4 times daily        SUMAtriptan 100 MG tablet    IMITREX     Take 100 mg by mouth as needed        tiZANidine 4 MG tablet    ZANAFLEX     Take 4 mg by  mouth as needed        * Notice:  This list has 2 medication(s) that are the same as other medications prescribed for you. Read the directions carefully, and ask your doctor or other care provider to review them with you.

## 2018-07-19 NOTE — NURSING NOTE
Chief Complaint   Patient presents with     New Patient     1st TAVR turndown      Vitals were taken and medications were reconciled.  JOHNATHAN Lira  3:05 PM

## 2018-07-19 NOTE — NURSING NOTE
Chief Complaint   Patient presents with     New Patient     68 year old male, here for evaluation of severe AS.      Vitals were taken and medications were reconciled.  JOHNATHAN Lira  3:06 PM

## 2018-07-19 NOTE — LETTER
7/19/2018      RE: Migel Stringer  [address]  [facility] [address]       Dear Colleague,    Thank you for the opportunity to participate in the care of your patient, Migel Stringer, at the [facility], formerly [facility]. Please see a copy of my visit note below.        [facility]  CARDIOVASCULAR DIVISION    VALVE CLINIC INITIAL CONSULTATION    PRIMARY: referred by anesthesia pre-op      PERTINENT CLINICAL HISTORY:     Migel Stringer is a very pleasant 68 year old male with severe aortic valvular stenosis referred to our clinic for evaluation and consideration for potential transcatheter aortic valve replacement (TAVR).  His severe aortic stenosis is characterized with an area of 1.0 cm2, mean gradient 25 mmHg and peak velocity of 3.1 m/sec with LVEF 60% by echocardiogram on 6/11/2018.  Additional notable medical history of gastric bypass in 2011, AAA (5.1cm, followed by an outside facility). There was mention of a kidney mass but per the patient this was determined to be a benign cyst and does not require further workup. The patient was mentioned on echo to have severe aortic stenosis, for which he was referred to valve clinic.       PAST MEDICAL HISTORY:     Past Medical History:   Diagnosis Date     AAA (abdominal aortic aneurysm) (H)      Aortic stenosis      Asthma      Atrial fibrillation (H)      Chronic low back pain      Chronic nausea      GERD (gastroesophageal reflux disease)      Heart murmur      Heart rate problem      History of sleep apnea      Hyperlipidemia      Hypertension      Insomnia      Left renal mass      Migraines      Mitral regurgitation      Peripheral neuropathy      Renal mass      Status post gastric bypass for obesity      Ulcer, gastric, acute      Vomiting in adult         PAST SURGICAL HISTORY:     Past Surgical History:   Procedure Laterality Date     CHOLECYSTECTOMY       COLONOSCOPY        ESOPHAGOGASTRODUODENOSCOPY       GASTRIC BYPASS  2011     HERNIA REPAIR       SPINE SURGERY      hx of 6 lumbar surgeries and 1 thoracic surgery        CURRENT MEDICATIONS:     Current Outpatient Prescriptions   Medication Sig Dispense Refill     albuterol (2.5 MG/3ML) 0.083% neb solution Inhale 2.5 mg into the lungs 2 times daily       albuterol (PROAIR HFA/PROVENTIL HFA/VENTOLIN HFA) 108 (90 Base) MCG/ACT Inhaler Inhale 1-2 puffs into the lungs 2 times daily        aspirin-acetaminophen-caffeine (EXCEDRIN MIGRAINE) 250-250-65 MG per tablet Take 1 tablet by mouth as needed for headaches       atorvastatin (LIPITOR) 20 MG tablet Take 20 mg by mouth every morning       cloNIDine (CATAPRES) 0.1 MG tablet Take 0.1 mg by mouth 2 times daily       digoxin (LANOXIN) 125 MCG tablet Take 125 mcg by mouth every morning       diphenhydrAMINE-acetaminophen (TYLENOL PM)  MG tablet Take 6 tablets by mouth At Bedtime       fentaNYL (DURAGESIC) 50 mcg/hr 72 hr patch Place 1 patch onto the skin every 72 hours        ipratropium - albuterol 0.5 mg/2.5 mg/3 mL (DUONEB) 0.5-2.5 (3) MG/3ML neb solution Inhale 3 mLs into the lungs 3 times daily       lisinopril (PRINIVIL/ZESTRIL) 10 MG tablet Take 10 mg by mouth every morning       omeprazole (PRILOSEC) 40 MG capsule Take 1 capsule (40 mg) by mouth 2 times daily Take 30-60 minutes before a meal. 60 capsule 11     oxyCODONE-acetaminophen (PERCOCET)  MG per tablet Take 1 tablet by mouth every 4 hours       pregabalin (LYRICA) 100 MG capsule Take 100 mg by mouth 4 times daily       prochlorperazine (COMPAZINE) 5 MG tablet Take 5 mg by mouth every 6 hours as needed        sucralfate (CARAFATE) 1 GM tablet Take 1 g by mouth 4 times daily       SUMAtriptan (IMITREX) 100 MG tablet Take 100 mg by mouth as needed       tiZANidine (ZANAFLEX) 4 MG tablet Take 4 mg by mouth as needed          ALLERGIES:     Allergies   Allergen Reactions     Heparin      Other reaction(s):  Thrombocytopenia  Platelets dropped precipitously 12/29/17 during hospitalization. HIT antibodies were not checked        FAMILY HISTORY:     Family History   Problem Relation Age of Onset     Skin Cancer Mother      Chronic Obstructive Pulmonary Disease Mother      Diabetes Mother      Liver Cancer Father      Breast Cancer Father      Breast Cancer Sister      No Known Problems Brother      Diabetes Maternal Grandmother      Cancer Paternal Grandmother      unspecified cancer     No Known Problems Brother      Cancer Paternal Grandfather      unspecified cancer        SOCIAL HISTORY:     Social History     Social History     Marital status:      Spouse name: N/A     Number of children: 4     Years of education: N/A     Occupational History      [employer]     Social History Main Topics     Smoking status: Former Smoker     Packs/day: 1.00     Years: 50.00     Types: Cigarettes, Cigars     Quit date: 2016     Smokeless tobacco: Never Used      Comment: quit cigarettes in 2016, but still smokes an occasional cigar( 5/31/2018)     Alcohol use No      Comment: rare     Drug use: No     Sexual activity: Not Currently     Partners: Female     Birth control/ protection: None     Other Topics Concern     Not on file     Social History Narrative        REVIEW OF SYSTEMS:     Constitutional: No fevers or chills  Skin: No new rash or itching  Eyes: No acute change in vision  Ears/Nose/Throat: No purulent rhinorrhea, new hearing loss, or new vertigo  Respiratory: No cough or hemoptysis  Cardiovascular: See HPI  Gastrointestinal: No change in appetite, vomiting, hematemesis or diarrhea  Genitourinary: No dysuria or hematuria  Musculoskeletal: No new back pain, neck pain or muscle pain  Neurologic: No new headaches, focal weakness or behavior changes  Psychiatric: No hallucinations, excessive alcohol consumption or illegal drug usage  Hematologic/Lymphatic/Immunologic: No bleeding, chills, fever, night  "sweats or weight loss  Endocrine: No new cold intolerance, heat intolerance, polyphagia, polydipsia or polyuria      PHYSICAL EXAMINATION:     /71 (BP Location: Right arm, Patient Position: Chair, Cuff Size: Adult Regular)  Pulse 57  Ht 1.765 m (5' 9.5\")  Wt 72.2 kg (159 lb 3 oz)  SpO2 95%  BMI 23.17 kg/m2    GENERAL: No acute distress.  HEENT: EOMI. Sclerae white, not injected. Nares clear. Pharynx without erythema or exudate.   Neck: No adenopathy. No thyromegaly. Symmetrical.   Heart: Regular rate and rhythm. Harsh crescendo decrescendo late peaking systolic murmur with radiation to carotids. Delayed carotid pulses.   Lungs: Clear to auscultation. No ronchi, wheezes, rales.   Abdomen: Soft, nontender, nondistended. Bowel sounds present.  Extremities: No clubbing, cyanosis, or edema.   Neurologic: Alert and oriented to person/place/time, normal speech and affect. No focal deficits.  Skin: No petechiae, purpura or rash.     LABORATORY DATA:     LIPID RESULTS:  No results found for: CHOL, HDL, LDL, TRIG, CHOLHDLRATIO    LIVER ENZYME RESULTS:  Lab Results   Component Value Date    AST 14 07/19/2018    ALT 21 07/19/2018       CBC RESULTS:  Lab Results   Component Value Date    WBC 6.8 07/19/2018    RBC 4.36 (L) 07/19/2018    HGB 12.2 (L) 07/19/2018    HCT 39.0 (L) 07/19/2018    MCV 89 07/19/2018    MCH 28.0 07/19/2018    MCHC 31.3 (L) 07/19/2018    RDW 19.6 (H) 07/19/2018     07/19/2018       BMP RESULTS:  Lab Results   Component Value Date     07/19/2018    POTASSIUM 4.5 07/19/2018    CHLORIDE 109 07/19/2018    CO2 28 07/19/2018    ANIONGAP 4 07/19/2018     (H) 07/19/2018    BUN 16 07/19/2018    CR 0.86 07/19/2018    GFRESTIMATED 88 07/19/2018    GFRESTBLACK >90 07/19/2018    SUZY 8.1 (L) 07/19/2018        A1C RESULTS:  No results found for: A1C    INR RESULTS:  Lab Results   Component Value Date    INR 1.11 05/31/2018          PROCEDURES & FURTHER ASSESSMENTS: "     ECG:  pending    Echocardiogram:  The visual ejection fraction is estimated at 60-65%.  No regional wall motion abnormalities noted.  Likely severe aortic stenosis as outlined below. The study was technically  difficult.    CT TAVR:  pending    Coronary Angiogram and Right Heart Catheterization:    PFTs:  complete    Carotid Ultrasound:  Read pending    STS RISK SCORE: 2.0  FRAILTY SCORE: Pending  NYHA CLASS: 2     CLINICAL IMPRESSION:     Migel Stringer is a 68 year old male with hx as above. He has been very limited from an exercise tolerance ability for the past several years. However, this is worsening. The echo has a stroke volume index in the 32-36 range, mean gradient of 25 mmHg, but a low dimensionless indez of 0.19 and an DULCE of 1.0cm2.    Plan:  In order to elucidate whether his symptoms are truly related to the aortic stenosis vs deconditioning. We will plan for a dobutamine stress echo to see if additional augmentation of the ventricle will increase his mean gradient. We will also plan for a angiogram/LHC/RHC after the dobutamine stress depending on the results.     Patient was evaluated in clinic with Dr. Smyth of interventional cardiology and Dr. Neal of cardiothoracic surgery.    SHANNON Pickens MD, PhD  Interventional Cardiology Fellow    I have seen and examined the patient with the TAVR team. I agree with the assessment and plan of the note above.I have reviewed pertinent labs.     Wale Smyth MD  Interventional Cardiology  Pager: [phone]    CC  Patient Care Team:  Nelli Mccallum as Nurse Coordinator (Cardiology)  [facility]

## 2018-07-19 NOTE — PROGRESS NOTES
[facility] HE[facility] CARE  CARDIOVASCULAR DIVISION    VALVE CLINIC INITIAL CONSULTATION    PRIMARY: referred by anesthesia pre-op      PERTINENT CLINICAL HISTORY:     Migel Stringer is a very pleasant 68 year old male with severe aortic valvular stenosis referred to our clinic for evaluation and consideration for potential transcatheter aortic valve replacement (TAVR).  His severe aortic stenosis is characterized with an area of 1.0 cm2, mean gradient 25 mmHg and peak velocity of 3.1 m/sec with LVEF 60% by echocardiogram on 6/11/2018.  Additional notable medical history of gastric bypass in 2011, AAA (5.1cm, followed by an outside facility). There was mention of a kidney mass but per the patient this was determined to be a benign cyst and does not require further workup. The patient was mentioned on echo to have severe aortic stenosis, for which he was referred to valve clinic.       PAST MEDICAL HISTORY:     Past Medical History:   Diagnosis Date     AAA (abdominal aortic aneurysm) (H)      Aortic stenosis      Asthma      Atrial fibrillation (H)      Chronic low back pain      Chronic nausea      GERD (gastroesophageal reflux disease)      Heart murmur      Heart rate problem      History of sleep apnea      Hyperlipidemia      Hypertension      Insomnia      Left renal mass      Migraines      Mitral regurgitation      Peripheral neuropathy      Renal mass      Status post gastric bypass for obesity      Ulcer, gastric, acute      Vomiting in adult         PAST SURGICAL HISTORY:     Past Surgical History:   Procedure Laterality Date     CHOLECYSTECTOMY       COLONOSCOPY       ESOPHAGOGASTRODUODENOSCOPY       GASTRIC BYPASS  2011     HERNIA REPAIR       SPINE SURGERY      hx of 6 lumbar surgeries and 1 thoracic surgery        CURRENT MEDICATIONS:     Current Outpatient Prescriptions   Medication Sig Dispense Refill     albuterol (2.5 MG/3ML) 0.083% neb solution Inhale 2.5 mg into the lungs 2 times daily        albuterol (PROAIR HFA/PROVENTIL HFA/VENTOLIN HFA) 108 (90 Base) MCG/ACT Inhaler Inhale 1-2 puffs into the lungs 2 times daily        aspirin-acetaminophen-caffeine (EXCEDRIN MIGRAINE) 250-250-65 MG per tablet Take 1 tablet by mouth as needed for headaches       atorvastatin (LIPITOR) 20 MG tablet Take 20 mg by mouth every morning       cloNIDine (CATAPRES) 0.1 MG tablet Take 0.1 mg by mouth 2 times daily       digoxin (LANOXIN) 125 MCG tablet Take 125 mcg by mouth every morning       diphenhydrAMINE-acetaminophen (TYLENOL PM)  MG tablet Take 6 tablets by mouth At Bedtime       fentaNYL (DURAGESIC) 50 mcg/hr 72 hr patch Place 1 patch onto the skin every 72 hours        ipratropium - albuterol 0.5 mg/2.5 mg/3 mL (DUONEB) 0.5-2.5 (3) MG/3ML neb solution Inhale 3 mLs into the lungs 3 times daily       lisinopril (PRINIVIL/ZESTRIL) 10 MG tablet Take 10 mg by mouth every morning       omeprazole (PRILOSEC) 40 MG capsule Take 1 capsule (40 mg) by mouth 2 times daily Take 30-60 minutes before a meal. 60 capsule 11     oxyCODONE-acetaminophen (PERCOCET)  MG per tablet Take 1 tablet by mouth every 4 hours       pregabalin (LYRICA) 100 MG capsule Take 100 mg by mouth 4 times daily       prochlorperazine (COMPAZINE) 5 MG tablet Take 5 mg by mouth every 6 hours as needed        sucralfate (CARAFATE) 1 GM tablet Take 1 g by mouth 4 times daily       SUMAtriptan (IMITREX) 100 MG tablet Take 100 mg by mouth as needed       tiZANidine (ZANAFLEX) 4 MG tablet Take 4 mg by mouth as needed          ALLERGIES:     Allergies   Allergen Reactions     Heparin      Other reaction(s): Thrombocytopenia  Platelets dropped precipitously 12/29/17 during hospitalization. HIT antibodies were not checked        FAMILY HISTORY:     Family History   Problem Relation Age of Onset     Skin Cancer Mother      Chronic Obstructive Pulmonary Disease Mother      Diabetes Mother      Liver Cancer Father      Breast Cancer Father      Breast  "Cancer Sister      No Known Problems Brother      Diabetes Maternal Grandmother      Cancer Paternal Grandmother      unspecified cancer     No Known Problems Brother      Cancer Paternal Grandfather      unspecified cancer        SOCIAL HISTORY:     Social History     Social History     Marital status:      Spouse name: N/A     Number of children: 4     Years of education: N/A     Occupational History      [employer]     Social History Main Topics     Smoking status: Former Smoker     Packs/day: 1.00     Years: 50.00     Types: Cigarettes, Cigars     Quit date: 2016     Smokeless tobacco: Never Used      Comment: quit cigarettes in 2016, but still smokes an occasional cigar( 5/31/2018)     Alcohol use No      Comment: rare     Drug use: No     Sexual activity: Not Currently     Partners: Female     Birth control/ protection: None     Other Topics Concern     Not on file     Social History Narrative        REVIEW OF SYSTEMS:     Constitutional: No fevers or chills  Skin: No new rash or itching  Eyes: No acute change in vision  Ears/Nose/Throat: No purulent rhinorrhea, new hearing loss, or new vertigo  Respiratory: No cough or hemoptysis  Cardiovascular: See HPI  Gastrointestinal: No change in appetite, vomiting, hematemesis or diarrhea  Genitourinary: No dysuria or hematuria  Musculoskeletal: No new back pain, neck pain or muscle pain  Neurologic: No new headaches, focal weakness or behavior changes  Psychiatric: No hallucinations, excessive alcohol consumption or illegal drug usage  Hematologic/Lymphatic/Immunologic: No bleeding, chills, fever, night sweats or weight loss  Endocrine: No new cold intolerance, heat intolerance, polyphagia, polydipsia or polyuria      PHYSICAL EXAMINATION:     /71 (BP Location: Right arm, Patient Position: Chair, Cuff Size: Adult Regular)  Pulse 57  Ht 1.765 m (5' 9.5\")  Wt 72.2 kg (159 lb 3 oz)  SpO2 95%  BMI 23.17 kg/m2    GENERAL: No acute " distress.  HEENT: EOMI. Sclerae white, not injected. Nares clear. Pharynx without erythema or exudate.   Neck: No adenopathy. No thyromegaly. Symmetrical.   Heart: Regular rate and rhythm. Harsh crescendo decrescendo late peaking systolic murmur with radiation to carotids. Delayed carotid pulses.   Lungs: Clear to auscultation. No ronchi, wheezes, rales.   Abdomen: Soft, nontender, nondistended. Bowel sounds present.  Extremities: No clubbing, cyanosis, or edema.   Neurologic: Alert and oriented to person/place/time, normal speech and affect. No focal deficits.  Skin: No petechiae, purpura or rash.     LABORATORY DATA:     LIPID RESULTS:  No results found for: CHOL, HDL, LDL, TRIG, CHOLHDLRATIO    LIVER ENZYME RESULTS:  Lab Results   Component Value Date    AST 14 07/19/2018    ALT 21 07/19/2018       CBC RESULTS:  Lab Results   Component Value Date    WBC 6.8 07/19/2018    RBC 4.36 (L) 07/19/2018    HGB 12.2 (L) 07/19/2018    HCT 39.0 (L) 07/19/2018    MCV 89 07/19/2018    MCH 28.0 07/19/2018    MCHC 31.3 (L) 07/19/2018    RDW 19.6 (H) 07/19/2018     07/19/2018       BMP RESULTS:  Lab Results   Component Value Date     07/19/2018    POTASSIUM 4.5 07/19/2018    CHLORIDE 109 07/19/2018    CO2 28 07/19/2018    ANIONGAP 4 07/19/2018     (H) 07/19/2018    BUN 16 07/19/2018    CR 0.86 07/19/2018    GFRESTIMATED 88 07/19/2018    GFRESTBLACK >90 07/19/2018    SUZY 8.1 (L) 07/19/2018        A1C RESULTS:  No results found for: A1C    INR RESULTS:  Lab Results   Component Value Date    INR 1.11 05/31/2018          PROCEDURES & FURTHER ASSESSMENTS:     ECG:  pending    Echocardiogram:  The visual ejection fraction is estimated at 60-65%.  No regional wall motion abnormalities noted.  Likely severe aortic stenosis as outlined below. The study was technically  difficult.    CT TAVR:  pending    Coronary Angiogram and Right Heart Catheterization:    PFTs:  complete    Carotid Ultrasound:  Read pending    STS  RISK SCORE: 2.0  FRAILTY SCORE: Pending  NYHA CLASS: 2     CLINICAL IMPRESSION:     Migel Stringer is a 68 year old male with hx as above. He has been very limited from an exercise tolerance ability for the past several years. However, this is worsening. The echo has a stroke volume index in the 32-36 range, mean gradient of 25 mmHg, but a low dimensionless indez of 0.19 and an DULCE of 1.0cm2.    Plan:  In order to elucidate whether his symptoms are truly related to the aortic stenosis vs deconditioning. We will plan for a dobutamine stress echo to see if additional augmentation of the ventricle will increase his mean gradient. We will also plan for a angiogram/LHC/RHC after the dobutamine stress depending on the results.     Patient was evaluated in clinic with Dr. Smyth of interventional cardiology and Dr. Neal of cardiothoracic surgery.    SHANNON Pickens MD, PhD  Interventional Cardiology Fellow      CC  Patient Care Team:  No Ref-Primary, Physician as PCP - Nelli Bledsoe as Nurse Coordinator (Cardiology)  [facility]    I have seen and examined the patient with the TAVR team. I agree with the assessment and plan of the note above.I have reviewed pertinent labs.     Wale Smyth MD  Interventional Cardiology  Pager: [phone]

## 2018-07-19 NOTE — MR AVS SNAPSHOT
After Visit Summary   7/19/2018    Migel Stringer    MRN: [id number]           Patient Information     Date Of Birth          1950        Visit Information        Provider Department      7/19/2018 3:15 PM  CV VALVE CLINIC [facility]        Today's Diagnoses     Coronary artery disease, angina presence unspecified, unspecified vessel or lesion type, unspecified whether native or transplanted heart    -  1    Severe aortic stenosis          Care Instructions    You were seen today in the Cardiovascular Clinic at the [facility].      Cardiology Providers you saw during your visit:  Dr. Smyth    Recommendations:  1.  Dobutamine stress test    2.   Angiogram        Dobutamine Stress Echocardiogram  B.  No alcohol, smoking or caffeine 12 hours before the procedure.  C.  Nothing by mouth 3 hours before the start of the procedure.  It's OK to take your medications with a sip of water.  D.  Report to the [facility] Waiting room, 15 minutes prior to the start of the procedure.    [facility], [place] [address]        You will be scheduled for a Coronary [place] [address]. You are to check in at the [facility] Waiting Area.       Pre procedure instructions:  1. Please make arrangements to have a  as you will not be allowed to drive following the procedure.  2. Do not eat or drink after midnight the day of your procedure.  3. You may take your usual morning medications with a sip of water the morning of your procedure.  4. Take a 325 mg aspirin the day before and the day of your procedure.  5. Make arrangements to have someone stay with you the night after your procedure.      Please call me if you have questions regarding these instructions or your scheduled procedure.          Nursing questions: [phone] option 1 then chose  option 3 for the triage nurse, and then ask to speak with Eloise.  For emergencies call 911.    It was a pleasure to see you in clinic.  If you have any questions at all, please feel free to give me a call.      Eloise Mccallum RN  Structural Heart Care Coordinator   TAVR and MitraClip Programs  [facility]  Office: [phone]    Clinics and Surgery Center  [address]  Cardiology Clinic [address]  [place] [address]              Follow-ups after your visit        Your next 10 appointments already scheduled     Jul 30, 2018 10:00 AM CDT   Ech Dobutamine Stress Test with [id number]   [facility], [place],  Echocardiography ([facility], [place])    [address] [facility] [address] [phone]           1.  Please bring or wear a comfortable two-piece outfit and walking shoes. 2.  Stop eating 3 hours before the test. You may drink water or juice. 3.  Stop all caffeine 12 hours before the test. This includes coffee, tea, soda pop, chocolate and certain medicines (such as Anacin and Excederin). Also avoid decaf coffee and tea, as these contain small amounts of caffeine. 4.  No alcohol, smoking or use of other tobacco products for 12 hours before the test. 5.  Refer to your provider instructions to see if you need to stop any medications (such as beta-blockers or nitrates) for this test. 6.  For patients with diabetes: -   If you take insulin, call your diabetes care team. Ask if you should take a   dose the morning of your test. -   If you take diabetes medicine by mouth, don't take it on the morning of your test. Bring it with you to take after the test.  (If you have questions, call your diabetes care team) 7.  When you arrive, please tell us if: -   You have diabetes. -   You have taken Viagra, Cialis or Levitra in the past 48 hours. 8.  For any questions that cannot be answered, please contact the ordering physician 9.  Please do not wear perfumes  "or scented lotions on the day of your exam.            Jul 30, 2018  1:00 PM CDT   Cath 90 Minute with [id number]   [facility], [place],  Heart Cath Lab ([facility])    [address] [id number] [phone]              Future tests that were ordered for you today     Open Future Orders        Priority Expected Expires Ordered    Cardiac Cath: Coronary Angiography Adult Order Routine 7/26/2018 10/24/2018 7/19/2018    Dobutamine Stress Echocardiogram Routine 7/26/2018 10/24/2018 7/19/2018            Who to contact     If you have questions or need follow up information about today's clinic visit or your schedule please contact [facility] directly at [phone].  Normal or non-critical lab and imaging results will be communicated to you by Growhart, letter or phone within 4 business days after the clinic has received the results. If you do not hear from us within 7 days, please contact the clinic through Growhart or phone. If you have a critical or abnormal lab result, we will notify you by phone as soon as possible.  Submit refill requests through 1SDK or call your pharmacy and they will forward the refill request to us. Please allow 3 business days for your refill to be completed.          Additional Information About Your Visit        1SDK Information     1SDK gives you secure access to your electronic health record. If you see a primary care provider, you can also send messages to your care team and make appointments. If you have questions, please call your primary care clinic.  If you do not have a primary care provider, please call [phone] and they will assist you.        Care EveryWhere ID     This is your Care EveryWhere ID. This could be used by other organizations to access your [facility] medical records  [id number]        Your Vitals Were     Pulse Height Pulse Oximetry BMI (Body Mass Index)          57 1.765 m (5' 9.5\") 95% 23.17 " kg/m2         Blood Pressure from Last 3 Encounters:   07/19/18 129/71   07/19/18 129/71   07/19/18 129/71    Weight from Last 3 Encounters:   07/19/18 72.2 kg (159 lb 3 oz)   07/19/18 72.2 kg (159 lb 3 oz)   07/19/18 75.2 kg (165 lb 12.8 oz)              We Performed the Following     EKG 12-lead, tracing only        Primary Care Provider Fax #    Physician No Ref-Primary [phone]       No address on file        Equal Access to Services     MALIHA MCDANIEL : Hadii aad rosibel pruitt Sojavier, waaxda luqadaha, qaybta kaalmada cristyyaanaya, susan manuel . So [facility] [phone].    ATENCIÓN: Si habla español, tiene a alvarez disposición servicios gratuitos de asistencia lingüística. Llame al [phone].    We comply with applicable federal civil rights laws and [place] laws. We do not discriminate on the basis of race, color, national origin, age, disability, sex, sexual orientation, or gender identity.            Thank you!     Thank you for choosing [facility]  for your care. Our goal is always to provide you with excellent care. Hearing back from our patients is one way we can continue to improve our services. Please take a few minutes to complete the written survey that you may receive in the mail after your visit with us. Thank you!             Your Updated Medication List - Protect others around you: Learn how to safely use, store and throw away your medicines at www.disposemymeds.org.          This list is accurate as of 7/19/18  3:58 PM.  Always use your most recent med list.                   Brand Name Dispense Instructions for use Diagnosis    * albuterol 108 (90 Base) MCG/ACT Inhaler    PROAIR HFA/PROVENTIL HFA/VENTOLIN HFA     Inhale 1-2 puffs into the lungs 2 times daily        * albuterol (2.5 MG/3ML) 0.083% neb solution      Inhale 2.5 mg into the lungs 2 times daily        aspirin-acetaminophen-caffeine 250-250-65 MG per tablet    EXCEDRIN MIGRAINE     Take 1 tablet by  mouth as needed for headaches        atorvastatin 20 MG tablet    LIPITOR     Take 20 mg by mouth every morning        cloNIDine 0.1 MG tablet    CATAPRES     Take 0.1 mg by mouth 2 times daily        digoxin 125 MCG tablet    LANOXIN     Take 125 mcg by mouth every morning        diphenhydrAMINE-acetaminophen  MG tablet    TYLENOL PM     Take 6 tablets by mouth At Bedtime        fentaNYL 50 mcg/hr 72 hr patch    DURAGESIC     Place 1 patch onto the skin every 72 hours        ipratropium - albuterol 0.5 mg/2.5 mg/3 mL 0.5-2.5 (3) MG/3ML neb solution    DUONEB     Inhale 3 mLs into the lungs 3 times daily        lisinopril 10 MG tablet    PRINIVIL/ZESTRIL     Take 10 mg by mouth every morning        omeprazole 40 MG capsule    priLOSEC    60 capsule    Take 1 capsule (40 mg) by mouth 2 times daily Take 30-60 minutes before a meal.    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)       oxyCODONE-acetaminophen  MG per tablet    PERCOCET     Take 1 tablet by mouth every 4 hours        pregabalin 100 MG capsule    LYRICA     Take 100 mg by mouth 4 times daily        prochlorperazine 5 MG tablet    COMPAZINE     Take 5 mg by mouth every 6 hours as needed        sucralfate 1 GM tablet    CARAFATE     Take 1 g by mouth 4 times daily        SUMAtriptan 100 MG tablet    IMITREX     Take 100 mg by mouth as needed        tiZANidine 4 MG tablet    ZANAFLEX     Take 4 mg by mouth as needed        * Notice:  This list has 2 medication(s) that are the same as other medications prescribed for you. Read the directions carefully, and ask your doctor or other care provider to review them with you.

## 2018-07-19 NOTE — PROGRESS NOTES
CARDIOTHORACIC VALVE SURGERY CONSULT    PERTINENT CLINICAL HISTORY:      Migel Stringer is a very pleasant 68 year old male with severe aortic valvular stenosis referred to our clinic for evaluation and consideration for potential transcatheter aortic valve replacement (TAVR).  His severe aortic stenosis is characterized with an area of 1.0 cm2, mean gradient 25 mmHg and peak velocity of 3.1 m/sec with LVEF 60% by echocardiogram on 6/11/2018.  Additional notable medical history of gastric bypass in 2011, AAA (5.1cm, followed by an outside facility). There was mention of a kidney mass but per the patient this was determined to be a benign cyst and does not require further workup. The patient was mentioned on echo to have severe aortic stenosis, for which he was referred to valve clinic.         PAST MEDICAL HISTORY:       Past Medical History         Past Medical History:   Diagnosis Date     AAA (abdominal aortic aneurysm) (H)       Aortic stenosis       Asthma       Atrial fibrillation (H)       Chronic low back pain       Chronic nausea       GERD (gastroesophageal reflux disease)       Heart murmur       Heart rate problem       History of sleep apnea       Hyperlipidemia       Hypertension       Insomnia       Left renal mass       Migraines       Mitral regurgitation       Peripheral neuropathy       Renal mass       Status post gastric bypass for obesity       Ulcer, gastric, acute       Vomiting in adult               PAST SURGICAL HISTORY:       Past Surgical History          Past Surgical History:   Procedure Laterality Date     CHOLECYSTECTOMY         COLONOSCOPY         ESOPHAGOGASTRODUODENOSCOPY         GASTRIC BYPASS   2011     HERNIA REPAIR         SPINE SURGERY         hx of 6 lumbar surgeries and 1 thoracic surgery             CURRENT MEDICATIONS:       Current Outpatient Prescriptions           Current Outpatient Prescriptions   Medication Sig Dispense Refill     albuterol (2.5 MG/3ML) 0.083%  neb solution Inhale 2.5 mg into the lungs 2 times daily         albuterol (PROAIR HFA/PROVENTIL HFA/VENTOLIN HFA) 108 (90 Base) MCG/ACT Inhaler Inhale 1-2 puffs into the lungs 2 times daily          aspirin-acetaminophen-caffeine (EXCEDRIN MIGRAINE) 250-250-65 MG per tablet Take 1 tablet by mouth as needed for headaches         atorvastatin (LIPITOR) 20 MG tablet Take 20 mg by mouth every morning         cloNIDine (CATAPRES) 0.1 MG tablet Take 0.1 mg by mouth 2 times daily         digoxin (LANOXIN) 125 MCG tablet Take 125 mcg by mouth every morning         diphenhydrAMINE-acetaminophen (TYLENOL PM)  MG tablet Take 6 tablets by mouth At Bedtime         fentaNYL (DURAGESIC) 50 mcg/hr 72 hr patch Place 1 patch onto the skin every 72 hours          ipratropium - albuterol 0.5 mg/2.5 mg/3 mL (DUONEB) 0.5-2.5 (3) MG/3ML neb solution Inhale 3 mLs into the lungs 3 times daily         lisinopril (PRINIVIL/ZESTRIL) 10 MG tablet Take 10 mg by mouth every morning         omeprazole (PRILOSEC) 40 MG capsule Take 1 capsule (40 mg) by mouth 2 times daily Take 30-60 minutes before a meal. 60 capsule 11     oxyCODONE-acetaminophen (PERCOCET)  MG per tablet Take 1 tablet by mouth every 4 hours         pregabalin (LYRICA) 100 MG capsule Take 100 mg by mouth 4 times daily         prochlorperazine (COMPAZINE) 5 MG tablet Take 5 mg by mouth every 6 hours as needed          sucralfate (CARAFATE) 1 GM tablet Take 1 g by mouth 4 times daily         SUMAtriptan (IMITREX) 100 MG tablet Take 100 mg by mouth as needed         tiZANidine (ZANAFLEX) 4 MG tablet Take 4 mg by mouth as needed                 ALLERGIES:            Allergies   Allergen Reactions     Heparin         Other reaction(s): Thrombocytopenia  Platelets dropped precipitously 12/29/17 during hospitalization. HIT antibodies were not checked          FAMILY HISTORY:       Family History    Family History   Problem Relation Age of Onset     Skin Cancer Mother        Chronic Obstructive Pulmonary Disease Mother       Diabetes Mother       Liver Cancer Father       Breast Cancer Father       Breast Cancer Sister       No Known Problems Brother       Diabetes Maternal Grandmother       Cancer Paternal Grandmother         unspecified cancer     No Known Problems Brother       Cancer Paternal Grandfather         unspecified cancer             SOCIAL HISTORY:      Social History            Social History     Marital status:        Spouse name: N/A     Number of children: 4     Years of education: N/A           Occupational History      [employer]             Social History Main Topics     Smoking status: Former Smoker       Packs/day: 1.00       Years: 50.00       Types: Cigarettes, Cigars       Quit date: 2016     Smokeless tobacco: Never Used         Comment: quit cigarettes in 2016, but still smokes an occasional cigar( 5/31/2018)     Alcohol use No         Comment: rare     Drug use: No     Sexual activity: Not Currently       Partners: Female       Birth control/ protection: None           Other Topics Concern     Not on file      Social History Narrative          REVIEW OF SYSTEMS:      Constitutional: No fevers or chills  Skin: No new rash or itching  Eyes: No acute change in vision  Ears/Nose/Throat: No purulent rhinorrhea, new hearing loss, or new vertigo  Respiratory: No cough or hemoptysis  Cardiovascular: See HPI  Gastrointestinal: No change in appetite, vomiting, hematemesis or diarrhea  Genitourinary: No dysuria or hematuria  Musculoskeletal: No new back pain, neck pain or muscle pain  Neurologic: No new headaches, focal weakness or behavior changes  Psychiatric: No hallucinations, excessive alcohol consumption or illegal drug usage  Hematologic/Lymphatic/Immunologic: No bleeding, chills, fever, night sweats or weight loss  Endocrine: No new cold intolerance, heat intolerance, polyphagia, polydipsia or polyuria       PHYSICAL EXAMINATION:  "     /71 (BP Location: Right arm, Patient Position: Chair, Cuff Size: Adult Regular)  Pulse 57  Ht 1.765 m (5' 9.5\")  Wt 72.2 kg (159 lb 3 oz)  SpO2 95%  BMI 23.17 kg/m2     GENERAL: No acute distress.  HEENT: EOMI. Sclerae white, not injected. Nares clear. Pharynx without erythema or exudate.   Neck: No adenopathy. No thyromegaly. Symmetrical.   Heart: Regular rate and rhythm. Harsh crescendo decrescendo late peaking systolic murmur with radiation to carotids. Delayed carotid pulses.   Lungs: Clear to auscultation. No ronchi, wheezes, rales.   Abdomen: Soft, nontender, nondistended. Bowel sounds present.  Extremities: No clubbing, cyanosis, or edema.   Neurologic: Alert and oriented to person/place/time, normal speech and affect. No focal deficits.  Skin: No petechiae, purpura or rash.      LABORATORY DATA:      LIPID RESULTS:  No results found for: CHOL, HDL, LDL, TRIG, CHOLHDLRATIO     LIVER ENZYME RESULTS:        Lab Results   Component Value Date     AST 14 07/19/2018     ALT 21 07/19/2018         CBC RESULTS:        Lab Results   Component Value Date     WBC 6.8 07/19/2018     RBC 4.36 (L) 07/19/2018     HGB 12.2 (L) 07/19/2018     HCT 39.0 (L) 07/19/2018     MCV 89 07/19/2018     MCH 28.0 07/19/2018     MCHC 31.3 (L) 07/19/2018     RDW 19.6 (H) 07/19/2018      07/19/2018         BMP RESULTS:        Lab Results   Component Value Date      07/19/2018     POTASSIUM 4.5 07/19/2018     CHLORIDE 109 07/19/2018     CO2 28 07/19/2018     ANIONGAP 4 07/19/2018      (H) 07/19/2018     BUN 16 07/19/2018     CR 0.86 07/19/2018     GFRESTIMATED 88 07/19/2018     GFRESTBLACK >90 07/19/2018     SUZY 8.1 (L) 07/19/2018         A1C RESULTS:  No results found for: A1C     INR RESULTS:        Lab Results   Component Value Date     INR 1.11 05/31/2018             PROCEDURES & FURTHER ASSESSMENTS:      ECG:  pending     Echocardiogram:  The visual ejection fraction is estimated at 60-65%.  No " regional wall motion abnormalities noted.  Likely severe aortic stenosis as outlined below. The study was technically  difficult.     CT TAVR:  pending     Coronary Angiogram and Right Heart Catheterization:     PFTs:  complete     Carotid Ultrasound:  Read pending     STS RISK SCORE: 2.0  FRAILTY SCORE: Pending  NYHA CLASS: 2      CLINICAL IMPRESSION:      Migel Stringer is a 68 year old male with hx as above. He has been very limited from an exercise tolerance ability for the past several years. However, this is worsening. The echo has a stroke volume index in the 32-36 range, mean gradient of 25 mmHg, but a low dimensionless indez of 0.19 and an DULCE of 1.0cm2.     Plan: I agree with the recommendation of Dr Hester to proceed with further investigations.  In order to elucidate whether his symptoms are truly related to the aortic stenosis vs deconditioning. We will plan for a dobutamine stress echo to see if additional augmentation of the ventricle will increase his mean gradient. We will also plan for a angiogram/LHC/RHC after the dobutamine stress depending on the results

## 2018-07-19 NOTE — LETTER
7/19/2018      RE: Migel Stringer  [address]  [facility] [address]       Dear Colleague,    Thank you for the opportunity to participate in the care of your patient, Migel Stringer, at the [facility] at [facility]. Please see a copy of my visit note below.    CARDIOTHORACIC VALVE SURGERY CONSULT    PERTINENT CLINICAL HISTORY:      Migel Stringer is a very pleasant 68 year old male with severe aortic valvular stenosis referred to our clinic for evaluation and consideration for potential transcatheter aortic valve replacement (TAVR).  His severe aortic stenosis is characterized with an area of 1.0 cm2, mean gradient 25 mmHg and peak velocity of 3.1 m/sec with LVEF 60% by echocardiogram on 6/11/2018.  Additional notable medical history of gastric bypass in 2011, AAA (5.1cm, followed by an outside facility). There was mention of a kidney mass but per the patient this was determined to be a benign cyst and does not require further workup. The patient was mentioned on echo to have severe aortic stenosis, for which he was referred to valve clinic.         PAST MEDICAL HISTORY:       Past Medical History         Past Medical History:   Diagnosis Date     AAA (abdominal aortic aneurysm) (H)       Aortic stenosis       Asthma       Atrial fibrillation (H)       Chronic low back pain       Chronic nausea       GERD (gastroesophageal reflux disease)       Heart murmur       Heart rate problem       History of sleep apnea       Hyperlipidemia       Hypertension       Insomnia       Left renal mass       Migraines       Mitral regurgitation       Peripheral neuropathy       Renal mass       Status post gastric bypass for obesity       Ulcer, gastric, acute       Vomiting in adult               PAST SURGICAL HISTORY:       Past Surgical History          Past Surgical History:   Procedure Laterality Date     CHOLECYSTECTOMY         COLONOSCOPY          ESOPHAGOGASTRODUODENOSCOPY         GASTRIC BYPASS   2011     HERNIA REPAIR         SPINE SURGERY         hx of 6 lumbar surgeries and 1 thoracic surgery             CURRENT MEDICATIONS:       Current Outpatient Prescriptions           Current Outpatient Prescriptions   Medication Sig Dispense Refill     albuterol (2.5 MG/3ML) 0.083% neb solution Inhale 2.5 mg into the lungs 2 times daily         albuterol (PROAIR HFA/PROVENTIL HFA/VENTOLIN HFA) 108 (90 Base) MCG/ACT Inhaler Inhale 1-2 puffs into the lungs 2 times daily          aspirin-acetaminophen-caffeine (EXCEDRIN MIGRAINE) 250-250-65 MG per tablet Take 1 tablet by mouth as needed for headaches         atorvastatin (LIPITOR) 20 MG tablet Take 20 mg by mouth every morning         cloNIDine (CATAPRES) 0.1 MG tablet Take 0.1 mg by mouth 2 times daily         digoxin (LANOXIN) 125 MCG tablet Take 125 mcg by mouth every morning         diphenhydrAMINE-acetaminophen (TYLENOL PM)  MG tablet Take 6 tablets by mouth At Bedtime         fentaNYL (DURAGESIC) 50 mcg/hr 72 hr patch Place 1 patch onto the skin every 72 hours          ipratropium - albuterol 0.5 mg/2.5 mg/3 mL (DUONEB) 0.5-2.5 (3) MG/3ML neb solution Inhale 3 mLs into the lungs 3 times daily         lisinopril (PRINIVIL/ZESTRIL) 10 MG tablet Take 10 mg by mouth every morning         omeprazole (PRILOSEC) 40 MG capsule Take 1 capsule (40 mg) by mouth 2 times daily Take 30-60 minutes before a meal. 60 capsule 11     oxyCODONE-acetaminophen (PERCOCET)  MG per tablet Take 1 tablet by mouth every 4 hours         pregabalin (LYRICA) 100 MG capsule Take 100 mg by mouth 4 times daily         prochlorperazine (COMPAZINE) 5 MG tablet Take 5 mg by mouth every 6 hours as needed          sucralfate (CARAFATE) 1 GM tablet Take 1 g by mouth 4 times daily         SUMAtriptan (IMITREX) 100 MG tablet Take 100 mg by mouth as needed         tiZANidine (ZANAFLEX) 4 MG tablet Take 4 mg by mouth as needed                  ALLERGIES:            Allergies   Allergen Reactions     Heparin         Other reaction(s): Thrombocytopenia  Platelets dropped precipitously 12/29/17 during hospitalization. HIT antibodies were not checked          FAMILY HISTORY:       Family History           Family History   Problem Relation Age of Onset     Skin Cancer Mother       Chronic Obstructive Pulmonary Disease Mother       Diabetes Mother       Liver Cancer Father       Breast Cancer Father       Breast Cancer Sister       No Known Problems Brother       Diabetes Maternal Grandmother       Cancer Paternal Grandmother         unspecified cancer     No Known Problems Brother       Cancer Paternal Grandfather         unspecified cancer             SOCIAL HISTORY:      Social History            Social History     Marital status:        Spouse name: N/A     Number of children: 4     Years of education: N/A           Occupational History      [employer] [place]             Social History Main Topics     Smoking status: Former Smoker       Packs/day: 1.00       Years: 50.00       Types: Cigarettes, Cigars       Quit date: 2016     Smokeless tobacco: Never Used         Comment: quit cigarettes in 2016, but still smokes an occasional cigar( 5/31/2018)     Alcohol use No         Comment: rare     Drug use: No     Sexual activity: Not Currently       Partners: Female       Birth control/ protection: None           Other Topics Concern     Not on file      Social History Narrative          REVIEW OF SYSTEMS:      Constitutional: No fevers or chills  Skin: No new rash or itching  Eyes: No acute change in vision  Ears/Nose/Throat: No purulent rhinorrhea, new hearing loss, or new vertigo  Respiratory: No cough or hemoptysis  Cardiovascular: See HPI  Gastrointestinal: No change in appetite, vomiting, hematemesis or diarrhea  Genitourinary: No dysuria or hematuria  Musculoskeletal: No new back pain, neck pain or muscle pain  Neurologic: No new  "headaches, focal weakness or behavior changes  Psychiatric: No hallucinations, excessive alcohol consumption or illegal drug usage  Hematologic/Lymphatic/Immunologic: No bleeding, chills, fever, night sweats or weight loss  Endocrine: No new cold intolerance, heat intolerance, polyphagia, polydipsia or polyuria       PHYSICAL EXAMINATION:      /71 (BP Location: Right arm, Patient Position: Chair, Cuff Size: Adult Regular)  Pulse 57  Ht 1.765 m (5' 9.5\")  Wt 72.2 kg (159 lb 3 oz)  SpO2 95%  BMI 23.17 kg/m2     GENERAL: No acute distress.  HEENT: EOMI. Sclerae white, not injected. Nares clear. Pharynx without erythema or exudate.   Neck: No adenopathy. No thyromegaly. Symmetrical.   Heart: Regular rate and rhythm. Harsh crescendo decrescendo late peaking systolic murmur with radiation to carotids. Delayed carotid pulses.   Lungs: Clear to auscultation. No ronchi, wheezes, rales.   Abdomen: Soft, nontender, nondistended. Bowel sounds present.  Extremities: No clubbing, cyanosis, or edema.   Neurologic: Alert and oriented to person/place/time, normal speech and affect. No focal deficits.  Skin: No petechiae, purpura or rash.      LABORATORY DATA:      LIPID RESULTS:  No results found for: CHOL, HDL, LDL, TRIG, CHOLHDLRATIO     LIVER ENZYME RESULTS:        Lab Results   Component Value Date     AST 14 07/19/2018     ALT 21 07/19/2018         CBC RESULTS:        Lab Results   Component Value Date     WBC 6.8 07/19/2018     RBC 4.36 (L) 07/19/2018     HGB 12.2 (L) 07/19/2018     HCT 39.0 (L) 07/19/2018     MCV 89 07/19/2018     MCH 28.0 07/19/2018     MCHC 31.3 (L) 07/19/2018     RDW 19.6 (H) 07/19/2018      07/19/2018         BMP RESULTS:        Lab Results   Component Value Date      07/19/2018     POTASSIUM 4.5 07/19/2018     CHLORIDE 109 07/19/2018     CO2 28 07/19/2018     ANIONGAP 4 07/19/2018      (H) 07/19/2018     BUN 16 07/19/2018     CR 0.86 07/19/2018     GFRESTIMATED 88 " 07/19/2018     GFRESTBLACK >90 07/19/2018     SUZY 8.1 (L) 07/19/2018         A1C RESULTS:  No results found for: A1C     INR RESULTS:        Lab Results   Component Value Date     INR 1.11 05/31/2018             PROCEDURES & FURTHER ASSESSMENTS:      ECG:  pending     Echocardiogram:  The visual ejection fraction is estimated at 60-65%.  No regional wall motion abnormalities noted.  Likely severe aortic stenosis as outlined below. The study was technically  difficult.     CT TAVR:  pending     Coronary Angiogram and Right Heart Catheterization:     PFTs:  complete     Carotid Ultrasound:  Read pending     STS RISK SCORE: 2.0  FRAILTY SCORE: Pending  NYHA CLASS: 2      CLINICAL IMPRESSION:      Migel Stringer is a 68 year old male with hx as above. He has been very limited from an exercise tolerance ability for the past several years. However, this is worsening. The echo has a stroke volume index in the 32-36 range, mean gradient of 25 mmHg, but a low dimensionless indez of 0.19 and an DULCE of 1.0cm2.     Plan: I agree with the recommendation of Dr Hester to proceed with further investigations.  In order to elucidate whether his symptoms are truly related to the aortic stenosis vs deconditioning. We will plan for a dobutamine stress echo to see if additional augmentation of the ventricle will increase his mean gradient. We will also plan for a angiogram/LHC/RHC after the dobutamine stress depending on the results    Please do not hesitate to contact me if you have any questions/concerns.     Sincerely,     Rafael Neal MD

## 2018-07-19 NOTE — NURSING NOTE
TAVR testing that was performed:    KCCQ-12 questionnaire collected: Yes.    Test: Yes  5M walk: Yes  Picture taken: YES  Chad Peterson CMA    2:37 PM

## 2018-07-19 NOTE — PATIENT INSTRUCTIONS
You were seen today in the Cardiovascular Clinic at the [facility].      Cardiology Providers you saw during your visit:  Dr. Smyth    Recommendations:  1.  Dobutamine stress test    2.   Angiogram        Dobutamine Stress Echocardiogram  B.  No alcohol, smoking or caffeine 12 hours before the procedure.  C.  Nothing by mouth 3 hours before the start of the procedure.  It's OK to take your medications with a sip of water.  D.  Report to the [facility] Waiting room, 15 minutes prior to the start of the procedure.    [facility], [place] [address]        You will be scheduled for a Coronary Angiogram at the General acute hospital, [address] [place] [address], [place] [address]. You are to check in at the [facility] Waiting Area.       Pre procedure instructions:  1. Please make arrangements to have a  as you will not be allowed to drive following the procedure.  2. Do not eat or drink after midnight the day of your procedure.  3. You may take your usual morning medications with a sip of water the morning of your procedure.  4. Take a 325 mg aspirin the day before and the day of your procedure.  5. Make arrangements to have someone stay with you the night after your procedure.      Please call me if you have questions regarding these instructions or your scheduled procedure.          Nursing questions: [phone] option 1 then chose option 3 for the triage nurse, and then ask to speak with Eloise.  For emergencies call [phone].    It was a pleasure to see you in clinic.  If you have any questions at all, please feel free to give me a call.      Eloise Mccallum RN  Structural Heart Care Coordinator   TAVR and MitraClip Programs  [facility]  Office: [phone]    Clinics and Surgery Center  [address] [place] [address]  Cardiology Clinic [place] [address] [place] [id number]

## 2018-07-20 RX ORDER — SODIUM CHLORIDE 9 MG/ML
INJECTION, SOLUTION INTRAVENOUS CONTINUOUS
Status: CANCELLED | OUTPATIENT
Start: 2018-07-20 | End: 2019-07-20

## 2018-07-20 RX ORDER — LIDOCAINE 40 MG/G
CREAM TOPICAL
Status: CANCELLED | OUTPATIENT
Start: 2018-07-20 | End: 2019-07-20

## 2018-07-20 ASSESSMENT — PATIENT HEALTH QUESTIONNAIRE - PHQ9: SUM OF ALL RESPONSES TO PHQ QUESTIONS 1-9: 15

## 2018-07-20 NOTE — NURSING NOTE
Chief Complaint   Patient presents with     New Patient     68 year old male, here for evaluation of severe AS.      You were seen today in the Cardiovascular Clinic at the [facility].      Cardiology Providers you saw during your visit:  Dr. Smyth    Recommendations:  1.  Dobutamine stress test    2.   Angiogram        Dobutamine Stress Echocardiogram  B.  No alcohol, smoking or caffeine 12 hours before the procedure.  C.  Nothing by mouth 3 hours before the start of the procedure.  It's OK to take your medications with a sip of water.  D.  Report to the [facility] Waiting room, 15 minutes prior to the start of the procedure.    [facility], [place] [address]        You will be scheduled for a Coronary Angiogram at the [facility], [address] [place] [address], [place] [address]. You are to check in at the [facility] Waiting Area.       Pre procedure instructions:  1. Please make arrangements to have a  as you will not be allowed to drive following the procedure.  2. Do not eat or drink after midnight the day of your procedure.  3. You may take your usual morning medications with a sip of water the morning of your procedure.  4. Take a 325 mg aspirin the day before and the day of your procedure.  5. Make arrangements to have someone stay with you the night after your procedure.      Please call me if you have questions regarding these instructions or your scheduled procedure.          Nursing questions: [phone] option 1 then chose option 3 for the triage nurse, and then ask to speak with Eloise.  For emergencies call [phone].    It was a pleasure to see you in clinic.  If you have any questions at all, please feel free to give me a call.      Eloise Mccallum RN  Structural Heart Care Coordinator   TAVR and MitraClip Programs  [facility]  Office: [phone]    Clinics and Surgery  Center  [address] [facility]  Cardiology Clinic [place] [address]  [place] [address]    Left Heart Catheterization: Patient given Coronary Angiogram and Angioplasty: A Patient's Guide booklet. Patient was instructed regarding left heart catheterization, including discussion of the indication, procedure, preparation, intra-procedural steps, and recovery at home. Patient demonstrated understanding of this information and agreed to call with further questions or concerns.  Med Reconcile: Reviewed and verified all current medications with the patient. The updated medication list was printed and given to the patient.  Return Appointment: Patient given instructions regarding scheduling next clinic visit. Patient demonstrated understanding of this information and agreed to call with further questions or concerns.  Patient stated he understood all health information given and agreed to call with further questions or concerns.

## 2018-07-23 ENCOUNTER — APPOINTMENT (OUTPATIENT)
Dept: OCCUPATIONAL MEDICINE | Facility: CLINIC | Age: 68
End: 2018-07-23

## 2018-07-23 PROCEDURE — 99000 SPECIMEN HANDLING OFFICE-LAB: CPT | Performed by: PHYSICIAN ASSISTANT

## 2018-07-30 ENCOUNTER — APPOINTMENT (OUTPATIENT)
Dept: MEDSURG UNIT | Facility: CLINIC | Age: 68
End: 2018-07-30
Attending: INTERNAL MEDICINE
Payer: MEDICARE

## 2018-07-30 ENCOUNTER — APPOINTMENT (OUTPATIENT)
Dept: LAB | Facility: CLINIC | Age: 68
End: 2018-07-30
Attending: INTERNAL MEDICINE
Payer: MEDICARE

## 2018-07-30 ENCOUNTER — HOSPITAL ENCOUNTER (OUTPATIENT)
Facility: CLINIC | Age: 68
Discharge: HOME | End: 2018-07-30
Attending: INTERNAL MEDICINE | Admitting: INTERNAL MEDICINE
Payer: MEDICARE

## 2018-07-30 ENCOUNTER — APPOINTMENT (OUTPATIENT)
Dept: CARDIOLOGY | Facility: CLINIC | Age: 68
End: 2018-07-30
Attending: INTERNAL MEDICINE
Payer: MEDICARE

## 2018-07-30 ENCOUNTER — HOSPITAL ENCOUNTER (OUTPATIENT)
Dept: CT IMAGING | Facility: CLINIC | Age: 68
End: 2018-07-30
Attending: INTERNAL MEDICINE | Admitting: INTERNAL MEDICINE
Payer: MEDICARE

## 2018-07-30 VITALS
DIASTOLIC BLOOD PRESSURE: 82 MMHG | WEIGHT: 165 LBS | BODY MASS INDEX: 23.62 KG/M2 | OXYGEN SATURATION: 98 % | SYSTOLIC BLOOD PRESSURE: 131 MMHG | HEART RATE: 51 BPM | TEMPERATURE: 98.3 F | RESPIRATION RATE: 20 BRPM | HEIGHT: 70 IN

## 2018-07-30 DIAGNOSIS — R51.9 HEADACHE DISORDER: Primary | ICD-10-CM

## 2018-07-30 DIAGNOSIS — R51.9 HEADACHE DISORDER: ICD-10-CM

## 2018-07-30 DIAGNOSIS — Z98.890 STATUS POST CORONARY ANGIOGRAM: ICD-10-CM

## 2018-07-30 DIAGNOSIS — I25.83 CORONARY ATHEROSCLEROSIS DUE TO LIPID RICH PLAQUE: ICD-10-CM

## 2018-07-30 DIAGNOSIS — I35.0 SEVERE AORTIC STENOSIS: ICD-10-CM

## 2018-07-30 DIAGNOSIS — I25.10 CORONARY ARTERY DISEASE, ANGINA PRESENCE UNSPECIFIED, UNSPECIFIED VESSEL OR LESION TYPE, UNSPECIFIED WHETHER NATIVE OR TRANSPLANTED HEART: ICD-10-CM

## 2018-07-30 LAB
ANION GAP SERPL CALCULATED.3IONS-SCNC: 5 MMOL/L (ref 3–14)
BUN SERPL-MCNC: 12 MG/DL (ref 7–30)
CALCIUM SERPL-MCNC: 8.1 MG/DL (ref 8.5–10.1)
CHLORIDE SERPL-SCNC: 108 MMOL/L (ref 94–109)
CO2 SERPL-SCNC: 28 MMOL/L (ref 20–32)
CREAT SERPL-MCNC: 0.72 MG/DL (ref 0.66–1.25)
ERYTHROCYTE [DISTWIDTH] IN BLOOD BY AUTOMATED COUNT: 19.7 % (ref 10–15)
GFR SERPL CREATININE-BSD FRML MDRD: >90 ML/MIN/1.7M2
GLUCOSE SERPL-MCNC: 87 MG/DL (ref 70–99)
HCT VFR BLD AUTO: 39.2 % (ref 40–53)
HGB BLD-MCNC: 12.5 G/DL (ref 13.3–17.7)
MCH RBC QN AUTO: 28.3 PG (ref 26.5–33)
MCHC RBC AUTO-ENTMCNC: 31.9 G/DL (ref 31.5–36.5)
MCV RBC AUTO: 89 FL (ref 78–100)
PLATELET # BLD AUTO: 109 10E9/L (ref 150–450)
POTASSIUM SERPL-SCNC: 4.6 MMOL/L (ref 3.4–5.3)
RBC # BLD AUTO: 4.42 10E12/L (ref 4.4–5.9)
SODIUM SERPL-SCNC: 141 MMOL/L (ref 133–144)
WBC # BLD AUTO: 6 10E9/L (ref 4–11)

## 2018-07-30 PROCEDURE — 40000065 ZZH STATISTIC EKG NON-CHARGEABLE

## 2018-07-30 PROCEDURE — 99152 MOD SED SAME PHYS/QHP 5/>YRS: CPT

## 2018-07-30 PROCEDURE — 93456 R HRT CORONARY ARTERY ANGIO: CPT | Mod: 26 | Performed by: INTERNAL MEDICINE

## 2018-07-30 PROCEDURE — 93016 CV STRESS TEST SUPVJ ONLY: CPT | Performed by: INTERNAL MEDICINE

## 2018-07-30 PROCEDURE — 27210946 ZZH KIT HC TOTES DISP CR8

## 2018-07-30 PROCEDURE — 27211089 ZZH KIT ACIST INJECTOR CR3

## 2018-07-30 PROCEDURE — 93350 STRESS TTE ONLY: CPT | Mod: 26 | Performed by: INTERNAL MEDICINE

## 2018-07-30 PROCEDURE — 93325 DOPPLER ECHO COLOR FLOW MAPG: CPT | Mod: TC

## 2018-07-30 PROCEDURE — 40000141 ZZH STATISTIC PERIPHERAL IV START W/O US GUIDANCE

## 2018-07-30 PROCEDURE — 40000172 ZZH STATISTIC PROCEDURE PREP ONLY

## 2018-07-30 PROCEDURE — 85027 COMPLETE CBC AUTOMATED: CPT | Performed by: INTERNAL MEDICINE

## 2018-07-30 PROCEDURE — 27210787 ZZH MANIFOLD CR2

## 2018-07-30 PROCEDURE — 25000128 H RX IP 250 OP 636: Performed by: INTERNAL MEDICINE

## 2018-07-30 PROCEDURE — 93460 R&L HRT ART/VENTRICLE ANGIO: CPT

## 2018-07-30 PROCEDURE — 93005 ELECTROCARDIOGRAM TRACING: CPT

## 2018-07-30 PROCEDURE — 36415 COLL VENOUS BLD VENIPUNCTURE: CPT | Performed by: INTERNAL MEDICINE

## 2018-07-30 PROCEDURE — 27210742 ZZH CATH CR1

## 2018-07-30 PROCEDURE — 93010 ELECTROCARDIOGRAM REPORT: CPT | Performed by: INTERNAL MEDICINE

## 2018-07-30 PROCEDURE — 27211181 ZZH BALLOON TIP PRESSURE CR5

## 2018-07-30 PROCEDURE — 25000125 ZZHC RX 250: Performed by: INTERNAL MEDICINE

## 2018-07-30 PROCEDURE — 93325 DOPPLER ECHO COLOR FLOW MAPG: CPT | Mod: 26 | Performed by: INTERNAL MEDICINE

## 2018-07-30 PROCEDURE — A9270 NON-COVERED ITEM OR SERVICE: HCPCS | Mod: GY | Performed by: INTERNAL MEDICINE

## 2018-07-30 PROCEDURE — 80048 BASIC METABOLIC PNL TOTAL CA: CPT | Performed by: INTERNAL MEDICINE

## 2018-07-30 PROCEDURE — 93018 CV STRESS TEST I&R ONLY: CPT | Performed by: INTERNAL MEDICINE

## 2018-07-30 PROCEDURE — 93321 DOPPLER ECHO F-UP/LMTD STD: CPT | Mod: 26 | Performed by: INTERNAL MEDICINE

## 2018-07-30 PROCEDURE — 25000128 H RX IP 250 OP 636: Performed by: STUDENT IN AN ORGANIZED HEALTH CARE EDUCATION/TRAINING PROGRAM

## 2018-07-30 PROCEDURE — 99153 MOD SED SAME PHYS/QHP EA: CPT

## 2018-07-30 PROCEDURE — 25000132 ZZH RX MED GY IP 250 OP 250 PS 637: Mod: GY | Performed by: INTERNAL MEDICINE

## 2018-07-30 PROCEDURE — 70450 CT HEAD/BRAIN W/O DYE: CPT

## 2018-07-30 RX ORDER — PROTAMINE SULFATE 10 MG/ML
1-5 INJECTION, SOLUTION INTRAVENOUS
Status: DISCONTINUED | OUTPATIENT
Start: 2018-07-30 | End: 2018-07-30 | Stop reason: HOSPADM

## 2018-07-30 RX ORDER — LORAZEPAM 2 MG/ML
.5-2 INJECTION INTRAMUSCULAR EVERY 4 HOURS PRN
Status: DISCONTINUED | OUTPATIENT
Start: 2018-07-30 | End: 2018-07-30 | Stop reason: HOSPADM

## 2018-07-30 RX ORDER — LIDOCAINE HYDROCHLORIDE 10 MG/ML
30 INJECTION, SOLUTION EPIDURAL; INFILTRATION; INTRACAUDAL; PERINEURAL
Status: DISCONTINUED | OUTPATIENT
Start: 2018-07-30 | End: 2018-07-30

## 2018-07-30 RX ORDER — IOPAMIDOL 755 MG/ML
60 INJECTION, SOLUTION INTRAVASCULAR ONCE
Status: COMPLETED | OUTPATIENT
Start: 2018-07-30 | End: 2018-07-30

## 2018-07-30 RX ORDER — ADENOSINE 3 MG/ML
12-12000 INJECTION, SOLUTION INTRAVENOUS
Status: DISCONTINUED | OUTPATIENT
Start: 2018-07-30 | End: 2018-07-30 | Stop reason: HOSPADM

## 2018-07-30 RX ORDER — NALOXONE HYDROCHLORIDE 0.4 MG/ML
.1-.4 INJECTION, SOLUTION INTRAMUSCULAR; INTRAVENOUS; SUBCUTANEOUS
Status: DISCONTINUED | OUTPATIENT
Start: 2018-07-30 | End: 2018-07-30 | Stop reason: HOSPADM

## 2018-07-30 RX ORDER — OXYCODONE AND ACETAMINOPHEN 10; 325 MG/1; MG/1
1 TABLET ORAL EVERY 4 HOURS PRN
Status: DISCONTINUED | OUTPATIENT
Start: 2018-07-30 | End: 2018-07-30 | Stop reason: HOSPADM

## 2018-07-30 RX ORDER — VERAPAMIL HYDROCHLORIDE 2.5 MG/ML
1-5 INJECTION, SOLUTION INTRAVENOUS
Status: DISCONTINUED | OUTPATIENT
Start: 2018-07-30 | End: 2018-07-30

## 2018-07-30 RX ORDER — ASPIRIN 81 MG/1
81-324 TABLET, CHEWABLE ORAL
Status: DISCONTINUED | OUTPATIENT
Start: 2018-07-30 | End: 2018-07-30 | Stop reason: HOSPADM

## 2018-07-30 RX ORDER — POTASSIUM CHLORIDE 7.45 MG/ML
10 INJECTION INTRAVENOUS
Status: DISCONTINUED | OUTPATIENT
Start: 2018-07-30 | End: 2018-07-30

## 2018-07-30 RX ORDER — HYDRALAZINE HYDROCHLORIDE 20 MG/ML
10 INJECTION INTRAMUSCULAR; INTRAVENOUS ONCE
Status: COMPLETED | OUTPATIENT
Start: 2018-07-30 | End: 2018-07-30

## 2018-07-30 RX ORDER — PRASUGREL 10 MG/1
10-60 TABLET, FILM COATED ORAL
Status: DISCONTINUED | OUTPATIENT
Start: 2018-07-30 | End: 2018-07-30

## 2018-07-30 RX ORDER — DOBUTAMINE HYDROCHLORIDE 200 MG/100ML
2-20 INJECTION INTRAVENOUS CONTINUOUS PRN
Status: DISCONTINUED | OUTPATIENT
Start: 2018-07-30 | End: 2018-07-30

## 2018-07-30 RX ORDER — SODIUM NITROPRUSSIDE 25 MG/ML
100-200 INJECTION INTRAVENOUS
Status: DISCONTINUED | OUTPATIENT
Start: 2018-07-30 | End: 2018-07-30

## 2018-07-30 RX ORDER — NITROGLYCERIN 20 MG/100ML
.07-2 INJECTION INTRAVENOUS CONTINUOUS PRN
Status: DISCONTINUED | OUTPATIENT
Start: 2018-07-30 | End: 2018-07-30

## 2018-07-30 RX ORDER — SODIUM CHLORIDE 9 MG/ML
INJECTION, SOLUTION INTRAVENOUS CONTINUOUS
Status: DISCONTINUED | OUTPATIENT
Start: 2018-07-30 | End: 2018-07-30 | Stop reason: HOSPADM

## 2018-07-30 RX ORDER — NITROGLYCERIN 0.4 MG/1
0.4 TABLET SUBLINGUAL EVERY 5 MIN PRN
Status: DISCONTINUED | OUTPATIENT
Start: 2018-07-30 | End: 2018-07-30 | Stop reason: HOSPADM

## 2018-07-30 RX ORDER — EPINEPHRINE 1 MG/ML
0.3 INJECTION, SOLUTION, CONCENTRATE INTRAVENOUS
Status: DISCONTINUED | OUTPATIENT
Start: 2018-07-30 | End: 2018-07-30

## 2018-07-30 RX ORDER — LIDOCAINE 40 MG/G
CREAM TOPICAL
Status: DISCONTINUED | OUTPATIENT
Start: 2018-07-30 | End: 2018-07-30 | Stop reason: HOSPADM

## 2018-07-30 RX ORDER — MAGNESIUM HYDROXIDE 1200 MG/15ML
1000 LIQUID ORAL CONTINUOUS PRN
Status: DISCONTINUED | OUTPATIENT
Start: 2018-07-30 | End: 2018-07-30

## 2018-07-30 RX ORDER — CLOPIDOGREL BISULFATE 75 MG/1
75 TABLET ORAL
Status: DISCONTINUED | OUTPATIENT
Start: 2018-07-30 | End: 2018-07-30 | Stop reason: HOSPADM

## 2018-07-30 RX ORDER — PHENYLEPHRINE HCL IN 0.9% NACL 1 MG/10 ML
20-100 SYRINGE (ML) INTRAVENOUS
Status: DISCONTINUED | OUTPATIENT
Start: 2018-07-30 | End: 2018-07-30

## 2018-07-30 RX ORDER — NITROGLYCERIN 5 MG/ML
100-500 VIAL (ML) INTRAVENOUS
Status: DISCONTINUED | OUTPATIENT
Start: 2018-07-30 | End: 2018-07-30

## 2018-07-30 RX ORDER — NITROGLYCERIN 5 MG/ML
100-200 VIAL (ML) INTRAVENOUS
Status: DISCONTINUED | OUTPATIENT
Start: 2018-07-30 | End: 2018-07-30

## 2018-07-30 RX ORDER — NICARDIPINE HYDROCHLORIDE 2.5 MG/ML
100 INJECTION INTRAVENOUS
Status: DISCONTINUED | OUTPATIENT
Start: 2018-07-30 | End: 2018-07-30

## 2018-07-30 RX ORDER — ASPIRIN 325 MG
325 TABLET ORAL
Status: DISCONTINUED | OUTPATIENT
Start: 2018-07-30 | End: 2018-07-30 | Stop reason: HOSPADM

## 2018-07-30 RX ORDER — ONDANSETRON 2 MG/ML
4 INJECTION INTRAMUSCULAR; INTRAVENOUS EVERY 4 HOURS PRN
Status: DISCONTINUED | OUTPATIENT
Start: 2018-07-30 | End: 2018-07-30 | Stop reason: HOSPADM

## 2018-07-30 RX ORDER — METOPROLOL TARTRATE 1 MG/ML
.5-15 INJECTION, SOLUTION INTRAVENOUS
Status: DISCONTINUED | OUTPATIENT
Start: 2018-07-30 | End: 2018-07-31 | Stop reason: HOSPADM

## 2018-07-30 RX ORDER — METOPROLOL TARTRATE 1 MG/ML
5 INJECTION, SOLUTION INTRAVENOUS EVERY 5 MIN PRN
Status: DISCONTINUED | OUTPATIENT
Start: 2018-07-30 | End: 2018-07-30 | Stop reason: HOSPADM

## 2018-07-30 RX ORDER — EPTIFIBATIDE 2 MG/ML
180 INJECTION, SOLUTION INTRAVENOUS EVERY 10 MIN PRN
Status: DISCONTINUED | OUTPATIENT
Start: 2018-07-30 | End: 2018-07-30

## 2018-07-30 RX ORDER — ATROPINE SULFATE 0.1 MG/ML
.5-1 INJECTION INTRAVENOUS
Status: DISCONTINUED | OUTPATIENT
Start: 2018-07-30 | End: 2018-07-30

## 2018-07-30 RX ORDER — HEPARIN SODIUM 1000 [USP'U]/ML
1000-10000 INJECTION, SOLUTION INTRAVENOUS; SUBCUTANEOUS EVERY 5 MIN PRN
Status: DISCONTINUED | OUTPATIENT
Start: 2018-07-30 | End: 2018-07-30

## 2018-07-30 RX ORDER — METHYLPREDNISOLONE SODIUM SUCCINATE 125 MG/2ML
125 INJECTION, POWDER, LYOPHILIZED, FOR SOLUTION INTRAMUSCULAR; INTRAVENOUS
Status: DISCONTINUED | OUTPATIENT
Start: 2018-07-30 | End: 2018-07-30 | Stop reason: HOSPADM

## 2018-07-30 RX ORDER — FENTANYL CITRATE 50 UG/ML
25-50 INJECTION, SOLUTION INTRAMUSCULAR; INTRAVENOUS
Status: DISCONTINUED | OUTPATIENT
Start: 2018-07-30 | End: 2018-07-30 | Stop reason: HOSPADM

## 2018-07-30 RX ORDER — POTASSIUM CHLORIDE 29.8 MG/ML
20 INJECTION INTRAVENOUS
Status: DISCONTINUED | OUTPATIENT
Start: 2018-07-30 | End: 2018-07-30

## 2018-07-30 RX ORDER — FLUMAZENIL 0.1 MG/ML
0.2 INJECTION, SOLUTION INTRAVENOUS
Status: DISCONTINUED | OUTPATIENT
Start: 2018-07-30 | End: 2018-07-30 | Stop reason: HOSPADM

## 2018-07-30 RX ORDER — ENALAPRILAT 1.25 MG/ML
1.25-2.5 INJECTION INTRAVENOUS
Status: DISCONTINUED | OUTPATIENT
Start: 2018-07-30 | End: 2018-07-30

## 2018-07-30 RX ORDER — FUROSEMIDE 10 MG/ML
20-100 INJECTION INTRAMUSCULAR; INTRAVENOUS
Status: DISCONTINUED | OUTPATIENT
Start: 2018-07-30 | End: 2018-07-30

## 2018-07-30 RX ORDER — NALOXONE HYDROCHLORIDE 0.4 MG/ML
0.4 INJECTION, SOLUTION INTRAMUSCULAR; INTRAVENOUS; SUBCUTANEOUS EVERY 5 MIN PRN
Status: DISCONTINUED | OUTPATIENT
Start: 2018-07-30 | End: 2018-07-30 | Stop reason: HOSPADM

## 2018-07-30 RX ORDER — ACETAMINOPHEN 325 MG/1
650 TABLET ORAL EVERY 4 HOURS PRN
Status: DISCONTINUED | OUTPATIENT
Start: 2018-07-30 | End: 2018-07-30 | Stop reason: HOSPADM

## 2018-07-30 RX ORDER — ARGATROBAN 1 MG/ML
350 INJECTION, SOLUTION INTRAVENOUS
Status: DISCONTINUED | OUTPATIENT
Start: 2018-07-30 | End: 2018-07-30

## 2018-07-30 RX ORDER — HYDRALAZINE HYDROCHLORIDE 20 MG/ML
10-20 INJECTION INTRAMUSCULAR; INTRAVENOUS
Status: DISCONTINUED | OUTPATIENT
Start: 2018-07-30 | End: 2018-07-30 | Stop reason: HOSPADM

## 2018-07-30 RX ORDER — ARGATROBAN 1 MG/ML
150 INJECTION, SOLUTION INTRAVENOUS
Status: DISCONTINUED | OUTPATIENT
Start: 2018-07-30 | End: 2018-07-30

## 2018-07-30 RX ORDER — NIFEDIPINE 10 MG/1
10 CAPSULE ORAL
Status: DISCONTINUED | OUTPATIENT
Start: 2018-07-30 | End: 2018-07-30

## 2018-07-30 RX ORDER — DOPAMINE HYDROCHLORIDE 160 MG/100ML
2-20 INJECTION, SOLUTION INTRAVENOUS CONTINUOUS PRN
Status: DISCONTINUED | OUTPATIENT
Start: 2018-07-30 | End: 2018-07-30

## 2018-07-30 RX ORDER — SODIUM CHLORIDE 9 MG/ML
INJECTION, SOLUTION INTRAVENOUS CONTINUOUS
Status: DISCONTINUED | OUTPATIENT
Start: 2018-07-30 | End: 2018-07-30

## 2018-07-30 RX ORDER — DOBUTAMINE HYDROCHLORIDE 200 MG/100ML
5-50 INJECTION INTRAVENOUS CONTINUOUS PRN
Status: COMPLETED | OUTPATIENT
Start: 2018-07-30 | End: 2018-07-30

## 2018-07-30 RX ORDER — CLOPIDOGREL BISULFATE 75 MG/1
300-600 TABLET ORAL
Status: DISCONTINUED | OUTPATIENT
Start: 2018-07-30 | End: 2018-07-30 | Stop reason: HOSPADM

## 2018-07-30 RX ORDER — DEXTROSE MONOHYDRATE 25 G/50ML
12.5-5 INJECTION, SOLUTION INTRAVENOUS EVERY 30 MIN PRN
Status: DISCONTINUED | OUTPATIENT
Start: 2018-07-30 | End: 2018-07-30 | Stop reason: HOSPADM

## 2018-07-30 RX ORDER — DIPHENHYDRAMINE HYDROCHLORIDE 50 MG/ML
25-50 INJECTION INTRAMUSCULAR; INTRAVENOUS
Status: DISCONTINUED | OUTPATIENT
Start: 2018-07-30 | End: 2018-07-30 | Stop reason: HOSPADM

## 2018-07-30 RX ORDER — PROTAMINE SULFATE 10 MG/ML
25-100 INJECTION, SOLUTION INTRAVENOUS EVERY 5 MIN PRN
Status: DISCONTINUED | OUTPATIENT
Start: 2018-07-30 | End: 2018-07-30 | Stop reason: HOSPADM

## 2018-07-30 RX ORDER — ATROPINE SULFATE 0.1 MG/ML
0.5 INJECTION INTRAVENOUS EVERY 5 MIN PRN
Status: DISCONTINUED | OUTPATIENT
Start: 2018-07-30 | End: 2018-07-30 | Stop reason: HOSPADM

## 2018-07-30 RX ORDER — NALOXONE HYDROCHLORIDE 0.4 MG/ML
.2-.4 INJECTION, SOLUTION INTRAMUSCULAR; INTRAVENOUS; SUBCUTANEOUS
Status: DISCONTINUED | OUTPATIENT
Start: 2018-07-30 | End: 2018-07-30 | Stop reason: HOSPADM

## 2018-07-30 RX ORDER — EPTIFIBATIDE 2 MG/ML
2 INJECTION, SOLUTION INTRAVENOUS CONTINUOUS PRN
Status: DISCONTINUED | OUTPATIENT
Start: 2018-07-30 | End: 2018-07-30

## 2018-07-30 RX ADMIN — HYDRALAZINE HYDROCHLORIDE 10 MG: 20 INJECTION INTRAMUSCULAR; INTRAVENOUS at 10:48

## 2018-07-30 RX ADMIN — METOROPROLOL TARTRATE 2 MG: 5 INJECTION, SOLUTION INTRAVENOUS at 10:48

## 2018-07-30 RX ADMIN — MIDAZOLAM 1 MG: 1 INJECTION INTRAMUSCULAR; INTRAVENOUS at 15:42

## 2018-07-30 RX ADMIN — IOPAMIDOL 60 ML: 755 INJECTION, SOLUTION INTRAVASCULAR at 16:30

## 2018-07-30 RX ADMIN — MIDAZOLAM 1 MG: 1 INJECTION INTRAMUSCULAR; INTRAVENOUS at 15:43

## 2018-07-30 RX ADMIN — MIDAZOLAM 2 MG: 1 INJECTION INTRAMUSCULAR; INTRAVENOUS at 15:58

## 2018-07-30 RX ADMIN — FENTANYL CITRATE 50 MCG: 50 INJECTION, SOLUTION INTRAMUSCULAR; INTRAVENOUS at 15:40

## 2018-07-30 RX ADMIN — FENTANYL CITRATE 50 MCG: 50 INJECTION, SOLUTION INTRAMUSCULAR; INTRAVENOUS at 15:45

## 2018-07-30 RX ADMIN — BACLOFEN 325 MG: 10 TABLET ORAL at 12:52

## 2018-07-30 RX ADMIN — FENTANYL CITRATE 50 MCG: 50 INJECTION, SOLUTION INTRAMUSCULAR; INTRAVENOUS at 15:46

## 2018-07-30 RX ADMIN — DOBUTAMINE IN DEXTROSE 5 MCG/KG/MIN: 200 INJECTION, SOLUTION INTRAVENOUS at 10:23

## 2018-07-30 RX ADMIN — FENTANYL CITRATE 50 MCG: 50 INJECTION, SOLUTION INTRAMUSCULAR; INTRAVENOUS at 15:43

## 2018-07-30 RX ADMIN — OXYCODONE HYDROCHLORIDE AND ACETAMINOPHEN 1 TABLET: 10; 325 TABLET ORAL at 17:53

## 2018-07-30 RX ADMIN — FENTANYL CITRATE 25 MCG: 50 INJECTION INTRAMUSCULAR; INTRAVENOUS at 16:52

## 2018-07-30 NOTE — PROGRESS NOTES
4 Fr arterial and 7 Fr venous sheaths removed from right groin by EUGENE Ahumada. Manual pressure applied for 18 minutes. Hemostasis achieved at 1715; patient remains on bedrest until 1915. Right groin site is clean, dry, and intact; no bleeding or hematoma noted.

## 2018-07-30 NOTE — PROGRESS NOTES
Mr. Stringer developed severe headache and blurry vision during dobutamine stress echo when his SBP increased >200mmHg. CT head was done which was unremarkable and didn't show any signs of stroke/bleeding.     Holden Rudolph  General Cardiology Fellow, PGY6  Pager [phone]

## 2018-07-30 NOTE — PROGRESS NOTES
Patient arrived post-echo procedure & has been prepped for cors/RH.  Has severe headache pain--it was 10/10 initially in echo after stress test, now at 6-7/10.  He has migraines for which he gets botox & takes migraine meds.  He has a Fentanyl patch in place right upper chest & took Oxycodone  at 1100 today.  Needs consent.  Labs done.  H & P current.  Wife is with him.

## 2018-07-30 NOTE — PROGRESS NOTES
Pt here for dobutamine stress test.  Test explained and questions answered prior to starting.  Aortic stenosis protocol followed.  After dobutamine gtt running at 10 mcg/kg/min for over 12 min, pt c/o intense headache and SBP noted to be over 215 (see flowsheets).  Pt also having short runs v-tach at peak HR.  Dobutamine gtt stopped and pt given 2 mg IV metoprolol.  MD called into room to assess patient.  MD order given for pt to receive an additional 10 mg IV hydralazine which was also given and decrease in SBP noted (see flowsheets).  MD also ordered a head CT for patient which was done.  After CT scan, pt stated his headache was slightly improving, but not totally gone.  Pt to have a LHC/RHC later today.  Pt monitored in echo prior to going to holding area for these procedures.

## 2018-07-30 NOTE — IP AVS SNAPSHOT
MRN:[id number]                      After Visit Summary   7/30/2018    Migel Stringer    MRN: [id number]           Thank you!     Thank you for choosing [facility] for your care. Our goal is always to provide you with excellent care. Hearing back from our patients is one way we can continue to improve our services. Please take a few minutes to complete the written survey that you may receive in the mail after you visit with us. Thank you!        Patient Information     Date Of Birth          1950        About your hospital stay     You were admitted on:  July 30, 2018 You last received care in the:  Unit 6D Observation [facility]    You were discharged on:  July 30, 2018       Who to Call     For medical emergencies, please call 911.  For non-urgent questions about your medical care, please call your primary care provider or clinic, None          Attending Provider     Provider Specialty    Wale Smyth MD Cardiology       Primary Care Provider Fax #    Physician No Ref-Primary [phone]      After Care Instructions     Discharge Instructions - IF on Metformin (Glucophage or Glucovance) or Metformin containing medications       IF on Metformin (Glucophage or Glucovance) or Metformin containing medications , schedule a Basic Metabolic Panel at [facility] or Primary Clinic in 48 - 72 hours post procedure and PRIOR TO resuming the Metformin or Metformin containing medications.  Hold Metformin (Glucophage or Glucovance) or Metformin containing medications until after the Basic Metabolic Panel on the 2nd or 3rd day following the procedure.  May resume after blood draw is complete.                  Further instructions from your care team           Patient Name: Migel Stringer  Date of Procedure: July 30, 2018    After you go home:    Have an adult stay with you for 24 hours.    Drink plenty of fluids.    You may eat your normal diet, unless your doctor tells you  otherwise.    For 24 hours:    Relax and take it easy.    Do NOT smoke.    Do NOT make any important or legal decisions.    Do NOT drive or operate machines at home or at work.    Do NOT drink alcohol.    Remove the Band-Aid after 24 hours. If there is minor oozing, apply another Band-aid and remove it after 12 hours.    For 2 days, do NOT have sex or do any heavy exercise.    Do NOT take a bath, or use a hot tub or pool for at least 3 days. You may shower.    Care of groin site  It is normal to have a small bruise or lump at the site.    Do not scrub the site.    For the first 2 days: Do not stoop or squat. When you cough, sneeze or move your bowels, hold your hand over the puncture site and press gently.    Do not lift more than 10 pounds for at least 3 to 5 days.    Do not use lotion or powder near the puncture site for 3 days.    If you start bleeding from the site in your groin, lie down flat and press firmly  on the site. Call your doctor as soon as you can.    Medicines    If you have started taking Plavix or Effient, do not stop taking it until you talk to your heart doctor (cardiologist).    If you are on metformin (Glucophage), do not restart it until you have blood tests (within 2 to 3 days after discharge). When your doctor tells you it is safe, you may restart the metformin.    If you have stopped any other medicines, check with your nurse or provider about when to restart them.    Call 911 right away if you have bleeding that is heavy or does not stop.    Call your doctor if:    You have a large or growing hard lump around the site.    The site is red, swollen, hot or tender.    Blood or fluid is draining from the site.    You have chills or a fever greater than 101 F (38 C).    Your leg or arm feels numb or cool.    You have hives, a rash or unusual itching.      [facility] [place]:  [phone] (7 days a week)            Pending Results     Date and Time Order Name  "Status Description    7/30/2018 1210 EKG 12-lead, tracing only Preliminary             Admission Information     Date & Time Provider Department Dept. Phone    7/30/2018 Wale Smyth MD Unit 6D Observation [facility] [phone]      Your Vitals Were     Blood Pressure Pulse Temperature Respirations Height Weight    131/82 51 98.3  F (36.8  C) (Oral) 20 1.778 m (5' 10\") 74.8 kg (165 lb)    Pulse Oximetry BMI (Body Mass Index)                98% 23.68 kg/m2          MyChart Information     [employer] gives you secure access to your electronic health record. If you see a primary care provider, you can also send messages to your care team and make appointments. If you have questions, please call your primary care clinic.  If you do not have a primary care provider, please call [phone] and they will assist you.        Care EveryWhere ID     This is your Care EveryWhere ID. This could be used by other organizations to access your [facility] medical records  [id number]        Equal Access to Services     MEME MCDANIEL : Hadii coleman pruitt Sojavier, waaxda luqadaha, qaybta kaalmaanaya adelisy, susan manuel . So [facility] [phone].    ATENCIÓN: Si habla español, tiene a alvarez disposición servicios gratuitos de asistencia lingüística. Llame al [phone].    We comply with applicable federal civil rights laws and [place] laws. We do not discriminate on the basis of race, color, national origin, age, disability, sex, sexual orientation, or gender identity.               Review of your medicines      CONTINUE these medicines which have NOT CHANGED        Dose / Directions    * albuterol 108 (90 Base) MCG/ACT Inhaler   Commonly known as:  PROAIR HFA/PROVENTIL HFA/VENTOLIN HFA        Dose:  1-2 puff   Inhale 1-2 puffs into the lungs 2 times daily   Refills:  0       * albuterol (2.5 MG/3ML) 0.083% neb solution        Dose:  2.5 mg   Inhale 2.5 mg into the lungs 2 times daily "   Refills:  0       aspirin-acetaminophen-caffeine 250-250-65 MG per tablet   Commonly known as:  EXCEDRIN MIGRAINE        Dose:  1 tablet   Take 1 tablet by mouth as needed for headaches   Refills:  0       atorvastatin 20 MG tablet   Commonly known as:  LIPITOR        Dose:  20 mg   Take 20 mg by mouth every morning   Refills:  0       cloNIDine 0.1 MG tablet   Commonly known as:  CATAPRES        Dose:  0.1 mg   Take 0.1 mg by mouth 2 times daily   Refills:  0       digoxin 125 MCG tablet   Commonly known as:  LANOXIN        Dose:  125 mcg   Take 125 mcg by mouth every morning   Refills:  0       diphenhydrAMINE-acetaminophen  MG tablet   Commonly known as:  TYLENOL PM        Dose:  6 tablet   Take 6 tablets by mouth At Bedtime   Refills:  0       fentaNYL 50 mcg/hr 72 hr patch   Commonly known as:  DURAGESIC        Dose:  1 patch   Place 1 patch onto the skin every 72 hours   Refills:  0       ipratropium - albuterol 0.5 mg/2.5 mg/3 mL 0.5-2.5 (3) MG/3ML neb solution   Commonly known as:  DUONEB        Dose:  3 mL   Inhale 3 mLs into the lungs 3 times daily   Refills:  0       lisinopril 10 MG tablet   Commonly known as:  PRINIVIL/ZESTRIL        Dose:  10 mg   Take 10 mg by mouth every morning   Refills:  0       omeprazole 40 MG capsule   Commonly known as:  priLOSEC   Used for:  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   Quantity:  60 capsule   Refills:  11       oxyCODONE-acetaminophen  MG per tablet   Commonly known as:  PERCOCET        Dose:  1 tablet   Take 1 tablet by mouth every 4 hours   Refills:  0       pregabalin 100 MG capsule   Commonly known as:  LYRICA        Dose:  100 mg   Take 100 mg by mouth 4 times daily   Refills:  0       prochlorperazine 5 MG tablet   Commonly known as:  COMPAZINE        Dose:  5 mg   Take 5 mg by mouth  every 6 hours as needed   Refills:  0       sucralfate 1 GM tablet   Commonly known as:  CARAFATE        Dose:  1 g   Take 1 g by mouth 4 times daily   Refills:  0       SUMAtriptan 100 MG tablet   Commonly known as:  IMITREX        Dose:  100 mg   Take 100 mg by mouth as needed   Refills:  0       tiZANidine 4 MG tablet   Commonly known as:  ZANAFLEX        Dose:  4 mg   Take 4 mg by mouth as needed   Refills:  0       * Notice:  This list has 2 medication(s) that are the same as other medications prescribed for you. Read the directions carefully, and ask your doctor or other care provider to review them with you.             Protect others around you: Learn how to safely use, store and throw away your medicines at www.disposemymeds.org.             Medication List: This is a list of all your medications and when to take them. Check marks below indicate your daily home schedule. Keep this list as a reference.      Medications           Morning Afternoon Evening Bedtime As Needed    * albuterol 108 (90 Base) MCG/ACT Inhaler   Commonly known as:  PROAIR HFA/PROVENTIL HFA/VENTOLIN HFA   Inhale 1-2 puffs into the lungs 2 times daily                                * albuterol (2.5 MG/3ML) 0.083% neb solution   Inhale 2.5 mg into the lungs 2 times daily                                aspirin-acetaminophen-caffeine 250-250-65 MG per tablet   Commonly known as:  EXCEDRIN MIGRAINE   Take 1 tablet by mouth as needed for headaches                                atorvastatin 20 MG tablet   Commonly known as:  LIPITOR   Take 20 mg by mouth every morning                                cloNIDine 0.1 MG tablet   Commonly known as:  CATAPRES   Take 0.1 mg by mouth 2 times daily                                digoxin 125 MCG tablet   Commonly known as:  LANOXIN   Take 125 mcg by mouth every morning                                diphenhydrAMINE-acetaminophen  MG tablet   Commonly known as:  TYLENOL PM   Take 6 tablets by mouth  At Bedtime                                fentaNYL 50 mcg/hr 72 hr patch   Commonly known as:  DURAGESIC   Place 1 patch onto the skin every 72 hours                                ipratropium - albuterol 0.5 mg/2.5 mg/3 mL 0.5-2.5 (3) MG/3ML neb solution   Commonly known as:  DUONEB   Inhale 3 mLs into the lungs 3 times daily                                lisinopril 10 MG tablet   Commonly known as:  PRINIVIL/ZESTRIL   Take 10 mg by mouth every morning                                omeprazole 40 MG capsule   Commonly known as:  priLOSEC   Take 1 capsule (40 mg) by mouth 2 times daily Take 30-60 minutes before a meal.                                oxyCODONE-acetaminophen  MG per tablet   Commonly known as:  PERCOCET   Take 1 tablet by mouth every 4 hours   Last time this was given:  1 tablet on 7/30/2018  5:53 PM                                pregabalin 100 MG capsule   Commonly known as:  LYRICA   Take 100 mg by mouth 4 times daily                                prochlorperazine 5 MG tablet   Commonly known as:  COMPAZINE   Take 5 mg by mouth every 6 hours as needed                                sucralfate 1 GM tablet   Commonly known as:  CARAFATE   Take 1 g by mouth 4 times daily                                SUMAtriptan 100 MG tablet   Commonly known as:  IMITREX   Take 100 mg by mouth as needed                                tiZANidine 4 MG tablet   Commonly known as:  ZANAFLEX   Take 4 mg by mouth as needed                                * Notice:  This list has 2 medication(s) that are the same as other medications prescribed for you. Read the directions carefully, and ask your doctor or other care provider to review them with you.

## 2018-07-30 NOTE — PROCEDURES
PRELIMINARY CARDIAC CATH REPORT:     PROCEDURES PERFORMED:   Right Heart Catheterization  Coronary Angiography  Left Heart Catheterization    PHYSICIANS:  Attending Physician: Wale Smyth MD  Interventional Cardiology Fellow: Carl Vazquez M.D.  Cardiology Fellow: Will Bland M.D.    INDICATION:  Migel Stringer is a 68 year old male with severe aortic stenosis and severely limited exercise capacity presenting for pre-TAVR work-up.  The indication for the procedure was pre-TAVR assessment.      DESCRIPTION:  1. Consent obtained with discussion of risks.  All questions were answered.  2. Sterile prep and procedure.  3. Location with Sheaths:   Rt Femoral Arterial  7 Fr 25 cm [long]  Rt Femoral Venous  4 Fr 25 cm [long]  4. Access: Local anesthetic with lidocaine.  A standard 18 guage needle with ultrasound guidance was used to establish vascular access using a modified Seldinger technique.  5. Diagnostic Catheters:   4 Fr  JL 4  6. Guiding Catheters:  6 Fr  JL 4 GC  6. Estimated blood loss: < 25 ml    MEDICATIONS:  The procedure was performed under conscious sedation for 28 minutes from 15:40 to 16:08.  The patient was assessed immediately before the first sedation medication was administered.  Midazolam 4 mg and Fentanyl 200 mcg were administered.  Heart rate, BP, respiration, oxygen saturation and patient responses were monitored throughout the procedure with the assistance of the RN under my supervision.      Procedures:    HEMODYNAMICS:    1. HR 42 bpm  2. /93/128 mmHg  3. RA 12   4. RV 45/12  5. PA 47/23/32  6. PCW 20   7. PA sat 47.5%   8. PCW sat 98%  9. Hgb 11.2 g/dL   10. Russ CO 2.7   11. Russ CI 1.4   12. PVR 11.8       CORONARY ANGIOGRAM:   1. Both coronary arteries arise from their respective cusps.  2. Dominance: Right  3. The LM has 30% eccentric disease   4. LAD: Type 3 [LAD supplies the entire apex].. The LAD gives rise to septal perforators, D1 and D2.  The pLAD is patient,  mLAD is patent, dLAD has a 30% stenosis, D1 has a 30% stenosis and D2 is a small branch.    5. LCX gives rise to large OM1 and OM2 vessels.  The pLCx has a 30% stenosis, mLCx has a 40% stenosis, dLCx has diffuse disease, OM1 has a proximal 30% disease% stenosis and OM2 has luminal irregularities.  6. RCA gives rise to PL branches and supplies PDA. The pRCA is patient, mRCA has a 50% stenosis with post-stenotic ectasia, dRCA has diffuse luminal irregularities.      LEFT HEART CATHETERIZATION:  1. LVEDP 20 mmHg.    Sheath Removal:  The catheter sheath was manually removed in the cardiac catheterization laboratory.    Contrast: Isovue, 60 ml     Fluoroscopy Time: 3.9 min    COMPLICATIONS:  1. None    SUMMARY:   >> High right sided filling pressures.  >> High left sided filling pressures.  >> Mild pulmonary artery hypertension  >> High left ventricular filling pressures.  >> Reduced cardiac output, 2.7 L/min with index 1.4 L/min/m2  Three vessel CAD RCA, LAD and LCX                                                          PLAN:     >> Bedrest per protocol.  >> Continued medical management and lifestyle modification for cardiovascular risk factor optimization.       The attending interventional cardiologist was present and supervised all critical aspects the procedure.    Findings discussed with Dr. Smyth.    See CVIS report for final draft.    Will Bland M.D.  Cardiology Fellow    Wale Smyth  Cardiology Staff

## 2018-07-30 NOTE — IP AVS SNAPSHOT
Unit 6D Observation [address] [address]    Phone:  [phone]    Fax:  [phone]                                       After Visit Summary   7/30/2018    Migel Stringer    MRN: [id number]           After Visit Summary Signature Page     I have received my discharge instructions, and my questions have been answered. I have discussed any challenges I see with this plan with the nurse or doctor.    ..........................................................................................................................................  Patient/Patient Representative Signature      ..........................................................................................................................................  Patient Representative Print Name and Relationship to Patient    ..................................................               ................................................  Date                                            Time    ..........................................................................................................................................  Reviewed by Signature/Title    ...................................................              ..............................................  Date                                                            Time

## 2018-07-30 NOTE — LETTER
UNIT [address]D OBSERVATION [address] [address]  [phone]          July 30, 2018            To Whom It May Concern:    Mr. Stringer was at our facility today to have a procedure during which a small incision was made. In order to protect the integrity of the incision, Mr. Stringer should refrain from lifting, pushing, or pulling anything weighing greater than 10 pounds until Monday, August 6th. He may resume normal activities without any restrictions on Tuesday, August 7th.     Sincerely,    Wale Smyth MD   Interventional Cardiology

## 2018-07-31 LAB — INTERPRETATION ECG - MUSE: NORMAL

## 2018-07-31 NOTE — PROGRESS NOTES
Patient discharged by circulator RN. He left floor with all belongings via wheelchair and accompanied by his wife.

## 2018-07-31 NOTE — PROGRESS NOTES
Bedrest completed at 1915. Patient ambulated in hallway. Groin site remains stable with no bleeding or hematoma noted. Patient has eaten dinner and has voided. Patient reports back pain improved after ambulation.

## 2018-07-31 NOTE — DISCHARGE INSTRUCTIONS
Patient Name: Migel Stringer  Date of Procedure: July 30, 2018    After you go home:    Have an adult stay with you for 24 hours.    Drink plenty of fluids.    You may eat your normal diet, unless your doctor tells you otherwise.    For 24 hours:    Relax and take it easy.    Do NOT smoke.    Do NOT make any important or legal decisions.    Do NOT drive or operate machines at home or at work.    Do NOT drink alcohol.    Remove the Band-Aid after 24 hours. If there is minor oozing, apply another Band-aid and remove it after 12 hours.    For 2 days, do NOT have sex or do any heavy exercise.    Do NOT take a bath, or use a hot tub or pool for at least 3 days. You may shower.    Care of groin site  It is normal to have a small bruise or lump at the site.    Do not scrub the site.    For the first 2 days: Do not stoop or squat. When you cough, sneeze or move your bowels, hold your hand over the puncture site and press gently.    Do not lift more than 10 pounds for at least 3 to 5 days.    Do not use lotion or powder near the puncture site for 3 days.    If you start bleeding from the site in your groin, lie down flat and press firmly  on the site. Call your doctor as soon as you can.    Medicines    If you have started taking Plavix or Effient, do not stop taking it until you talk to your heart doctor (cardiologist).    If you are on metformin (Glucophage), do not restart it until you have blood tests (within 2 to 3 days after discharge). When your doctor tells you it is safe, you may restart the metformin.    If you have stopped any other medicines, check with your nurse or provider about when to restart them.    Call 911 right away if you have bleeding that is heavy or does not stop.    Call your doctor if:    You have a large or growing hard lump around the site.    The site is red, swollen, hot or tender.    Blood or fluid is draining from the site.    You have chills or a fever greater than 101 F  (38 C).    Your leg or arm feels numb or cool.    You have hives, a rash or unusual itching.      [facility] [place]:  [phone] (7 days a week)

## 2018-08-01 ENCOUNTER — CARE COORDINATION (OUTPATIENT)
Dept: CARDIOLOGY | Facility: CLINIC | Age: 68
End: 2018-08-01

## 2018-08-01 ENCOUNTER — TRANSFERRED RECORDS (OUTPATIENT)
Dept: HEALTH INFORMATION MANAGEMENT | Facility: CLINIC | Age: 68
End: 2018-08-01

## 2018-08-01 ENCOUNTER — HOSPITAL ENCOUNTER (INPATIENT)
Facility: CLINIC | Age: 68
LOS: 1 days | Discharge: HOME | DRG: 066 | End: 2018-08-02
Attending: PSYCHIATRY & NEUROLOGY | Admitting: PSYCHIATRY & NEUROLOGY
Payer: MEDICARE

## 2018-08-01 DIAGNOSIS — I48.21 PERMANENT ATRIAL FIBRILLATION (H): Primary | ICD-10-CM

## 2018-08-01 DIAGNOSIS — I10 ESSENTIAL HYPERTENSION: ICD-10-CM

## 2018-08-01 PROBLEM — I63.9 STROKE (H): Status: ACTIVE | Noted: 2018-08-01

## 2018-08-01 LAB
CREAT SERPL-MCNC: 0.99 MG/DL (ref 0.6–1.3)
GFR SERPL CREATININE-BSD FRML MDRD: >60 ML/MIN/1.73M2
GLUCOSE SERPL-MCNC: 106 MG/DL (ref 70–100)
HBA1C MFR BLD: 5.4 % (ref 0–5.6)
INR PPP: 1.1
POTASSIUM SERPL-SCNC: 4.4 MMOL/L (ref 3.5–5.1)

## 2018-08-01 PROCEDURE — 25000132 ZZH RX MED GY IP 250 OP 250 PS 637: Mod: GY | Performed by: STUDENT IN AN ORGANIZED HEALTH CARE EDUCATION/TRAINING PROGRAM

## 2018-08-01 PROCEDURE — 12000008 ZZH R&B INTERMEDIATE UMMC

## 2018-08-01 PROCEDURE — 36415 COLL VENOUS BLD VENIPUNCTURE: CPT | Performed by: STUDENT IN AN ORGANIZED HEALTH CARE EDUCATION/TRAINING PROGRAM

## 2018-08-01 PROCEDURE — 83036 HEMOGLOBIN GLYCOSYLATED A1C: CPT | Performed by: STUDENT IN AN ORGANIZED HEALTH CARE EDUCATION/TRAINING PROGRAM

## 2018-08-01 PROCEDURE — A9270 NON-COVERED ITEM OR SERVICE: HCPCS | Mod: GY | Performed by: STUDENT IN AN ORGANIZED HEALTH CARE EDUCATION/TRAINING PROGRAM

## 2018-08-01 PROCEDURE — 25000128 H RX IP 250 OP 636: Performed by: STUDENT IN AN ORGANIZED HEALTH CARE EDUCATION/TRAINING PROGRAM

## 2018-08-01 RX ORDER — SUCRALFATE 1 G/1
1 TABLET ORAL 4 TIMES DAILY
Status: DISCONTINUED | OUTPATIENT
Start: 2018-08-01 | End: 2018-08-02 | Stop reason: HOSPADM

## 2018-08-01 RX ORDER — AMOXICILLIN 250 MG
2 CAPSULE ORAL 2 TIMES DAILY
Status: DISCONTINUED | OUTPATIENT
Start: 2018-08-01 | End: 2018-08-02 | Stop reason: HOSPADM

## 2018-08-01 RX ORDER — PREGABALIN 100 MG/1
100 CAPSULE ORAL 4 TIMES DAILY
Status: DISCONTINUED | OUTPATIENT
Start: 2018-08-01 | End: 2018-08-02 | Stop reason: HOSPADM

## 2018-08-01 RX ORDER — SODIUM CHLORIDE 9 MG/ML
INJECTION, SOLUTION INTRAVENOUS CONTINUOUS
Status: DISCONTINUED | OUTPATIENT
Start: 2018-08-01 | End: 2018-08-02 | Stop reason: HOSPADM

## 2018-08-01 RX ORDER — CLONIDINE HYDROCHLORIDE 0.1 MG/1
0.1 TABLET ORAL 2 TIMES DAILY
Status: DISCONTINUED | OUTPATIENT
Start: 2018-08-01 | End: 2018-08-02 | Stop reason: HOSPADM

## 2018-08-01 RX ORDER — DIPHENHYDRAMINE HYDROCHLORIDE 50 MG/ML
12.5 INJECTION INTRAMUSCULAR; INTRAVENOUS ONCE
Status: COMPLETED | OUTPATIENT
Start: 2018-08-01 | End: 2018-08-01

## 2018-08-01 RX ORDER — ATORVASTATIN CALCIUM 20 MG/1
20 TABLET, FILM COATED ORAL EVERY MORNING
Status: DISCONTINUED | OUTPATIENT
Start: 2018-08-02 | End: 2018-08-02 | Stop reason: HOSPADM

## 2018-08-01 RX ORDER — ASPIRIN 81 MG/1
81 TABLET, CHEWABLE ORAL DAILY
Status: DISCONTINUED | OUTPATIENT
Start: 2018-08-01 | End: 2018-08-02 | Stop reason: HOSPADM

## 2018-08-01 RX ORDER — OXYCODONE AND ACETAMINOPHEN 10; 325 MG/1; MG/1
1 TABLET ORAL EVERY 4 HOURS PRN
Status: DISCONTINUED | OUTPATIENT
Start: 2018-08-01 | End: 2018-08-02 | Stop reason: HOSPADM

## 2018-08-01 RX ORDER — FENTANYL 50 UG/1
50 PATCH TRANSDERMAL
Status: DISCONTINUED | OUTPATIENT
Start: 2018-08-01 | End: 2018-08-02 | Stop reason: HOSPADM

## 2018-08-01 RX ORDER — NALOXONE HYDROCHLORIDE 0.4 MG/ML
.1-.4 INJECTION, SOLUTION INTRAMUSCULAR; INTRAVENOUS; SUBCUTANEOUS
Status: DISCONTINUED | OUTPATIENT
Start: 2018-08-01 | End: 2018-08-02 | Stop reason: HOSPADM

## 2018-08-01 RX ORDER — LABETALOL HYDROCHLORIDE 5 MG/ML
10-20 INJECTION, SOLUTION INTRAVENOUS EVERY 10 MIN PRN
Status: DISCONTINUED | OUTPATIENT
Start: 2018-08-01 | End: 2018-08-02 | Stop reason: HOSPADM

## 2018-08-01 RX ORDER — DIGOXIN 125 MCG
125 TABLET ORAL EVERY MORNING
Status: DISCONTINUED | OUTPATIENT
Start: 2018-08-02 | End: 2018-08-02 | Stop reason: HOSPADM

## 2018-08-01 RX ORDER — AMOXICILLIN 250 MG
1 CAPSULE ORAL 2 TIMES DAILY
Status: DISCONTINUED | OUTPATIENT
Start: 2018-08-01 | End: 2018-08-02 | Stop reason: HOSPADM

## 2018-08-01 RX ORDER — LISINOPRIL 10 MG/1
10 TABLET ORAL EVERY MORNING
Status: DISCONTINUED | OUTPATIENT
Start: 2018-08-02 | End: 2018-08-02 | Stop reason: HOSPADM

## 2018-08-01 RX ORDER — HYDRALAZINE HYDROCHLORIDE 20 MG/ML
10-20 INJECTION INTRAMUSCULAR; INTRAVENOUS
Status: DISCONTINUED | OUTPATIENT
Start: 2018-08-01 | End: 2018-08-02 | Stop reason: HOSPADM

## 2018-08-01 RX ORDER — ALBUTEROL SULFATE 0.83 MG/ML
2.5 SOLUTION RESPIRATORY (INHALATION) EVERY 6 HOURS PRN
Status: DISCONTINUED | OUTPATIENT
Start: 2018-08-01 | End: 2018-08-02 | Stop reason: HOSPADM

## 2018-08-01 RX ADMIN — FENTANYL 1 PATCH: 50 PATCH, EXTENDED RELEASE TRANSDERMAL at 20:20

## 2018-08-01 RX ADMIN — PROCHLORPERAZINE EDISYLATE 5 MG: 5 INJECTION INTRAMUSCULAR; INTRAVENOUS at 20:41

## 2018-08-01 RX ADMIN — PREGABALIN 100 MG: 100 CAPSULE ORAL at 20:20

## 2018-08-01 RX ADMIN — SUCRALFATE 1 G: 1 TABLET ORAL at 20:20

## 2018-08-01 RX ADMIN — DIPHENHYDRAMINE HYDROCHLORIDE 12.5 MG: 50 INJECTION, SOLUTION INTRAMUSCULAR; INTRAVENOUS at 20:46

## 2018-08-01 RX ADMIN — SENNOSIDES AND DOCUSATE SODIUM 1 TABLET: 8.6; 5 TABLET ORAL at 20:21

## 2018-08-01 RX ADMIN — ASPIRIN 81 MG CHEWABLE TABLET 81 MG: 81 TABLET CHEWABLE at 20:20

## 2018-08-01 RX ADMIN — CLONIDINE HYDROCHLORIDE 0.1 MG: 0.1 TABLET ORAL at 20:20

## 2018-08-01 RX ADMIN — SODIUM CHLORIDE: 9 INJECTION, SOLUTION INTRAVENOUS at 21:42

## 2018-08-01 ASSESSMENT — ACTIVITIES OF DAILY LIVING (ADL)
FALL_HISTORY_WITHIN_LAST_SIX_MONTHS: NO
RETIRED_COMMUNICATION: 0-->UNDERSTANDS/COMMUNICATES WITHOUT DIFFICULTY
AMBULATION: 0-->INDEPENDENT
TRANSFERRING: 0-->INDEPENDENT
COGNITION: 0 - NO COGNITION ISSUES REPORTED
RETIRED_EATING: 0-->INDEPENDENT
ADLS_ACUITY_SCORE: 9
DRESS: 0-->INDEPENDENT
SWALLOWING: 0-->SWALLOWS FOODS/LIQUIDS WITHOUT DIFFICULTY
BATHING: 0-->INDEPENDENT
TOILETING: 0-->INDEPENDENT

## 2018-08-01 ASSESSMENT — PAIN DESCRIPTION - DESCRIPTORS: DESCRIPTORS: CONSTANT;DULL

## 2018-08-01 ASSESSMENT — VISUAL ACUITY
OU: NORMAL ACUITY
OU: NORMAL ACUITY

## 2018-08-01 NOTE — IP AVS SNAPSHOT
Unit [address] [address]    Phone:  [phone]                                       After Visit Summary   8/1/2018    Migel Stringer    MRN: [id number]           After Visit Summary Signature Page     I have received my discharge instructions, and my questions have been answered. I have discussed any challenges I see with this plan with the nurse or doctor.    ..........................................................................................................................................  Patient/Patient Representative Signature      ..........................................................................................................................................  Patient Representative Print Name and Relationship to Patient    ..................................................               ................................................  Date                                            Time    ..........................................................................................................................................  Reviewed by Signature/Title    ...................................................              ..............................................  Date                                                            Time

## 2018-08-01 NOTE — PROGRESS NOTES
Follow up phone call made to Ghislaine, wife of the patient.  She states that last night he had left arm weakness.  He went to the ER in [place].  She states that they did a head CT but it was negative for a stroke.  She states that they are now going to perform a MRI.    I let her know that we were going to be discussing her 's pre-TAVR case at conference tomorrow, and that I would let her know about the discussion.    She states that her  is starting a new job next week.  He had informed his employer of the possibility he would need a cardiac procedure on his heart.  She states that they had said that he should get this done as soon as possible.    Will continue to follow up.

## 2018-08-02 ENCOUNTER — APPOINTMENT (OUTPATIENT)
Dept: CARDIOLOGY | Facility: CLINIC | Age: 68
DRG: 066 | End: 2018-08-02
Attending: STUDENT IN AN ORGANIZED HEALTH CARE EDUCATION/TRAINING PROGRAM
Payer: MEDICARE

## 2018-08-02 ENCOUNTER — APPOINTMENT (OUTPATIENT)
Dept: OCCUPATIONAL THERAPY | Facility: CLINIC | Age: 68
DRG: 066 | End: 2018-08-02
Attending: STUDENT IN AN ORGANIZED HEALTH CARE EDUCATION/TRAINING PROGRAM
Payer: MEDICARE

## 2018-08-02 ENCOUNTER — CARE COORDINATION (OUTPATIENT)
Dept: CARDIOLOGY | Facility: CLINIC | Age: 68
End: 2018-08-02

## 2018-08-02 ENCOUNTER — APPOINTMENT (OUTPATIENT)
Dept: CARDIOLOGY | Facility: CLINIC | Age: 68
DRG: 066 | End: 2018-08-02
Attending: PSYCHIATRY & NEUROLOGY
Payer: MEDICARE

## 2018-08-02 VITALS
HEART RATE: 86 BPM | DIASTOLIC BLOOD PRESSURE: 74 MMHG | TEMPERATURE: 97.4 F | RESPIRATION RATE: 16 BRPM | OXYGEN SATURATION: 99 % | SYSTOLIC BLOOD PRESSURE: 119 MMHG

## 2018-08-02 LAB
ANION GAP SERPL CALCULATED.3IONS-SCNC: 4 MMOL/L (ref 3–14)
BUN SERPL-MCNC: 13 MG/DL (ref 7–30)
CALCIUM SERPL-MCNC: 8.1 MG/DL (ref 8.5–10.1)
CHLORIDE SERPL-SCNC: 108 MMOL/L (ref 94–109)
CHOLEST SERPL-MCNC: 108 MG/DL
CO2 SERPL-SCNC: 28 MMOL/L (ref 20–32)
CREAT SERPL-MCNC: 0.69 MG/DL (ref 0.66–1.25)
ERYTHROCYTE [DISTWIDTH] IN BLOOD BY AUTOMATED COUNT: 18.8 % (ref 10–15)
GFR SERPL CREATININE-BSD FRML MDRD: >90 ML/MIN/1.7M2
GLUCOSE BLDC GLUCOMTR-MCNC: 148 MG/DL (ref 70–99)
GLUCOSE BLDC GLUCOMTR-MCNC: 82 MG/DL (ref 70–99)
GLUCOSE BLDC GLUCOMTR-MCNC: 85 MG/DL (ref 70–99)
GLUCOSE BLDC GLUCOMTR-MCNC: 88 MG/DL (ref 70–99)
GLUCOSE SERPL-MCNC: 87 MG/DL (ref 70–99)
HCT VFR BLD AUTO: 36.9 % (ref 40–53)
HDLC SERPL-MCNC: 42 MG/DL
HGB BLD-MCNC: 11.8 G/DL (ref 13.3–17.7)
LDLC SERPL CALC-MCNC: 47 MG/DL
MCH RBC QN AUTO: 27.8 PG (ref 26.5–33)
MCHC RBC AUTO-ENTMCNC: 32 G/DL (ref 31.5–36.5)
MCV RBC AUTO: 87 FL (ref 78–100)
NONHDLC SERPL-MCNC: 66 MG/DL
PLATELET # BLD AUTO: 97 10E9/L (ref 150–450)
POTASSIUM SERPL-SCNC: 4.1 MMOL/L (ref 3.4–5.3)
RBC # BLD AUTO: 4.24 10E12/L (ref 4.4–5.9)
SODIUM SERPL-SCNC: 140 MMOL/L (ref 133–144)
TRIGL SERPL-MCNC: 91 MG/DL
WBC # BLD AUTO: 5.1 10E9/L (ref 4–11)

## 2018-08-02 PROCEDURE — 97535 SELF CARE MNGMENT TRAINING: CPT | Mod: GO

## 2018-08-02 PROCEDURE — 36415 COLL VENOUS BLD VENIPUNCTURE: CPT | Performed by: STUDENT IN AN ORGANIZED HEALTH CARE EDUCATION/TRAINING PROGRAM

## 2018-08-02 PROCEDURE — A9270 NON-COVERED ITEM OR SERVICE: HCPCS | Mod: GY | Performed by: STUDENT IN AN ORGANIZED HEALTH CARE EDUCATION/TRAINING PROGRAM

## 2018-08-02 PROCEDURE — 93325 DOPPLER ECHO COLOR FLOW MAPG: CPT | Mod: 26 | Performed by: INTERNAL MEDICINE

## 2018-08-02 PROCEDURE — 00000146 ZZHCL STATISTIC GLUCOSE BY METER IP

## 2018-08-02 PROCEDURE — 25000128 H RX IP 250 OP 636: Performed by: INTERNAL MEDICINE

## 2018-08-02 PROCEDURE — 93325 DOPPLER ECHO COLOR FLOW MAPG: CPT

## 2018-08-02 PROCEDURE — 93312 ECHO TRANSESOPHAGEAL: CPT | Mod: 26 | Performed by: INTERNAL MEDICINE

## 2018-08-02 PROCEDURE — 25000132 ZZH RX MED GY IP 250 OP 250 PS 637: Mod: GY | Performed by: STUDENT IN AN ORGANIZED HEALTH CARE EDUCATION/TRAINING PROGRAM

## 2018-08-02 PROCEDURE — 93005 ELECTROCARDIOGRAM TRACING: CPT

## 2018-08-02 PROCEDURE — 99153 MOD SED SAME PHYS/QHP EA: CPT

## 2018-08-02 PROCEDURE — 85027 COMPLETE CBC AUTOMATED: CPT | Performed by: STUDENT IN AN ORGANIZED HEALTH CARE EDUCATION/TRAINING PROGRAM

## 2018-08-02 PROCEDURE — 93312 ECHO TRANSESOPHAGEAL: CPT

## 2018-08-02 PROCEDURE — 93320 DOPPLER ECHO COMPLETE: CPT | Mod: 26 | Performed by: INTERNAL MEDICINE

## 2018-08-02 PROCEDURE — 93308 TTE F-UP OR LMTD: CPT | Mod: 26 | Performed by: INTERNAL MEDICINE

## 2018-08-02 PROCEDURE — 93321 DOPPLER ECHO F-UP/LMTD STD: CPT | Mod: 26 | Performed by: INTERNAL MEDICINE

## 2018-08-02 PROCEDURE — 97530 THERAPEUTIC ACTIVITIES: CPT | Mod: GO

## 2018-08-02 PROCEDURE — 97165 OT EVAL LOW COMPLEX 30 MIN: CPT | Mod: GO

## 2018-08-02 PROCEDURE — 80048 BASIC METABOLIC PNL TOTAL CA: CPT | Performed by: STUDENT IN AN ORGANIZED HEALTH CARE EDUCATION/TRAINING PROGRAM

## 2018-08-02 PROCEDURE — 93010 ELECTROCARDIOGRAM REPORT: CPT | Performed by: INTERNAL MEDICINE

## 2018-08-02 PROCEDURE — 27210995 ZZH RX 272: Performed by: INTERNAL MEDICINE

## 2018-08-02 PROCEDURE — 40000133 ZZH STATISTIC OT WARD VISIT

## 2018-08-02 PROCEDURE — 99222 1ST HOSP IP/OBS MODERATE 55: CPT | Mod: 25 | Performed by: INTERNAL MEDICINE

## 2018-08-02 PROCEDURE — 80061 LIPID PANEL: CPT | Performed by: STUDENT IN AN ORGANIZED HEALTH CARE EDUCATION/TRAINING PROGRAM

## 2018-08-02 PROCEDURE — 25000125 ZZHC RX 250: Performed by: INTERNAL MEDICINE

## 2018-08-02 PROCEDURE — 99152 MOD SED SAME PHYS/QHP 5/>YRS: CPT

## 2018-08-02 RX ORDER — FENTANYL CITRATE 50 UG/ML
25-50 INJECTION, SOLUTION INTRAMUSCULAR; INTRAVENOUS
Status: COMPLETED | OUTPATIENT
Start: 2018-08-02 | End: 2018-08-02

## 2018-08-02 RX ORDER — FLUMAZENIL 0.1 MG/ML
0.2 INJECTION, SOLUTION INTRAVENOUS
Status: DISCONTINUED | OUTPATIENT
Start: 2018-08-02 | End: 2018-08-02 | Stop reason: HOSPADM

## 2018-08-02 RX ORDER — ACYCLOVIR 200 MG/1
20 CAPSULE ORAL ONCE
Status: DISCONTINUED | OUTPATIENT
Start: 2018-08-02 | End: 2018-08-02 | Stop reason: HOSPADM

## 2018-08-02 RX ORDER — LISINOPRIL 20 MG/1
20 TABLET ORAL EVERY MORNING
Qty: 30 TABLET | Refills: 1 | Status: SHIPPED | OUTPATIENT
Start: 2018-08-02 | End: 2018-08-03

## 2018-08-02 RX ORDER — NICOTINE 21 MG/24HR
1 PATCH, TRANSDERMAL 24 HOURS TRANSDERMAL DAILY
Status: DISCONTINUED | OUTPATIENT
Start: 2018-08-02 | End: 2018-08-02 | Stop reason: HOSPADM

## 2018-08-02 RX ORDER — ACYCLOVIR 200 MG/1
5 CAPSULE ORAL ONCE
Status: COMPLETED | OUTPATIENT
Start: 2018-08-02 | End: 2018-08-02

## 2018-08-02 RX ORDER — NALOXONE HYDROCHLORIDE 0.4 MG/ML
.1-.4 INJECTION, SOLUTION INTRAMUSCULAR; INTRAVENOUS; SUBCUTANEOUS
Status: DISCONTINUED | OUTPATIENT
Start: 2018-08-02 | End: 2018-08-02 | Stop reason: HOSPADM

## 2018-08-02 RX ORDER — FENTANYL CITRATE 50 UG/ML
25 INJECTION, SOLUTION INTRAMUSCULAR; INTRAVENOUS
Status: DISCONTINUED | OUTPATIENT
Start: 2018-08-02 | End: 2018-08-02 | Stop reason: HOSPADM

## 2018-08-02 RX ORDER — LIDOCAINE 40 MG/G
CREAM TOPICAL
Status: DISCONTINUED | OUTPATIENT
Start: 2018-08-02 | End: 2018-08-02 | Stop reason: HOSPADM

## 2018-08-02 RX ORDER — SODIUM CHLORIDE 9 MG/ML
INJECTION, SOLUTION INTRAVENOUS CONTINUOUS PRN
Status: DISCONTINUED | OUTPATIENT
Start: 2018-08-02 | End: 2018-08-02 | Stop reason: HOSPADM

## 2018-08-02 RX ADMIN — OXYCODONE HYDROCHLORIDE AND ACETAMINOPHEN 1 TABLET: 10; 325 TABLET ORAL at 15:40

## 2018-08-02 RX ADMIN — NICOTINE 1 PATCH: 14 PATCH, EXTENDED RELEASE TRANSDERMAL at 18:21

## 2018-08-02 RX ADMIN — OXYCODONE HYDROCHLORIDE AND ACETAMINOPHEN 1 TABLET: 10; 325 TABLET ORAL at 11:33

## 2018-08-02 RX ADMIN — SUCRALFATE 1 G: 1 TABLET ORAL at 11:33

## 2018-08-02 RX ADMIN — SUCRALFATE 1 G: 1 TABLET ORAL at 07:49

## 2018-08-02 RX ADMIN — OXYCODONE HYDROCHLORIDE AND ACETAMINOPHEN 1 TABLET: 10; 325 TABLET ORAL at 07:49

## 2018-08-02 RX ADMIN — CLONIDINE HYDROCHLORIDE 0.1 MG: 0.1 TABLET ORAL at 07:49

## 2018-08-02 RX ADMIN — ASPIRIN 81 MG CHEWABLE TABLET 81 MG: 81 TABLET CHEWABLE at 07:49

## 2018-08-02 RX ADMIN — ATORVASTATIN CALCIUM 20 MG: 20 TABLET, FILM COATED ORAL at 07:49

## 2018-08-02 RX ADMIN — OXYCODONE HYDROCHLORIDE AND ACETAMINOPHEN 1 TABLET: 10; 325 TABLET ORAL at 01:39

## 2018-08-02 RX ADMIN — LIDOCAINE HYDROCHLORIDE 30 ML: 20 SOLUTION ORAL; TOPICAL at 12:56

## 2018-08-02 RX ADMIN — TOPICAL ANESTHETIC 0.5 ML: 200 SPRAY DENTAL; PERIODONTAL at 12:57

## 2018-08-02 RX ADMIN — LISINOPRIL 10 MG: 10 TABLET ORAL at 07:49

## 2018-08-02 RX ADMIN — SUCRALFATE 1 G: 1 TABLET ORAL at 15:40

## 2018-08-02 RX ADMIN — OMEPRAZOLE 40 MG: 20 CAPSULE, DELAYED RELEASE ORAL at 07:49

## 2018-08-02 RX ADMIN — FENTANYL CITRATE 50 MCG: 50 INJECTION INTRAMUSCULAR; INTRAVENOUS at 13:03

## 2018-08-02 RX ADMIN — PREGABALIN 100 MG: 100 CAPSULE ORAL at 07:49

## 2018-08-02 RX ADMIN — PREGABALIN 100 MG: 100 CAPSULE ORAL at 15:40

## 2018-08-02 RX ADMIN — MIDAZOLAM 1.5 MG: 1 INJECTION INTRAMUSCULAR; INTRAVENOUS at 13:06

## 2018-08-02 RX ADMIN — NICOTINE 1 PATCH: 14 PATCH, EXTENDED RELEASE TRANSDERMAL at 10:10

## 2018-08-02 RX ADMIN — SODIUM CHLORIDE 5 ML: 9 INJECTION, SOLUTION INTRAMUSCULAR; INTRAVENOUS; SUBCUTANEOUS at 13:34

## 2018-08-02 RX ADMIN — DIGOXIN 125 MCG: 125 TABLET ORAL at 07:49

## 2018-08-02 RX ADMIN — PREGABALIN 100 MG: 100 CAPSULE ORAL at 11:33

## 2018-08-02 ASSESSMENT — VISUAL ACUITY
OU: NORMAL ACUITY
OU: BLURRED VISION;DOUBLE VISION/DIPLOPIA
OU: NORMAL ACUITY;BASELINE;GLASSES
OU: NORMAL ACUITY

## 2018-08-02 ASSESSMENT — ACTIVITIES OF DAILY LIVING (ADL)
ADLS_ACUITY_SCORE: 7
ADLS_ACUITY_SCORE: 9
ADLS_ACUITY_SCORE: 7
ADLS_ACUITY_SCORE: 9
ADLS_ACUITY_SCORE: 9

## 2018-08-02 ASSESSMENT — PAIN DESCRIPTION - DESCRIPTORS: DESCRIPTORS: CONSTANT

## 2018-08-02 NOTE — H&P
[facility], [place]      Neurology Stroke Admission    Patient Name: Migel Stringer  : 1950 MRN#: [id number]    STROKE DATA    Stroke Code:  Stroke code not activated.  Time patient seen:  2018 1840  Last known normal (pt's baseline):  18 2300     TPA treatment:  Not given due to outside the time window.      Stroke Scales      National Institutes of Health Stroke Scale (at presentation)   NIHSS done at:  time patient seen      Score    Level of consciousness:  (0)   Alert, keenly responsive    LOC questions:  (1)   Answers one question correctly    LOC commands:  (0)   Performs both tasks correctly    Best gaze:  (0)   Normal    Visual:  (0)   No visual loss    Facial palsy:  (0)   Normal symmetrical movements    Motor arm (left):  (1)   Drift    Motor arm (right):  (0)   No drift    Motor leg (left):  (0)   No drift    Motor leg (right):  (0)   No drift    Limb ataxia:  (1)   Present in one limb    Sensory:  (1)   Mild to moderate sensory loss    Best language:  (0)   Normal- no aphasia    Dysarthria:  (0)   Normal    Extinction and inattention:  (0)   No abnormality        NIHSS Total Score:  4          Dysphagia Screen  Time of screenin2018 1900  Screening results: Passed screening, no dysarthria - Regular Diet with thin liquids     ASSESSMENT & PLAN BY PROBLEM     Migel Stringer is a 68 year old right handed male with PMH of migraines, current cigar smoker, peripheral neuropathy, hypertension, atrial fibrillation not anticoagulated, aortic stenosis, hyperlipidemia and chronic pain on opioids who presents with left upper extremity weakness in biceps and triceps along and reduced sensation in left upper extremity. On exam he also has left hand dysmetria. Outside imaging shows diffusion restriction in the left cerebellum and left parietal that I reviewed and is quite small. Unsure that this is correlating with his symptoms and wonder if part of  his symptoms are exacerbated by ongoing migraine. He has several risk factors for stroke including known atrial fibrillation, not on anticoagulation; work-up to be completed by stroke service.     Work-up for Ischemic Stroke (no TPA)     Acute Ischemic Stroke (without tPA) Plan  - Admit to Neurology  - Neurochecks  - Permissive HTN; labetalol PRN for SBP > 220  - Euthermia, Euglycemia  - Daily aspirin for secondary stroke prevention  - Statin  - MRI/MRA Stroke Protocol  - TTE with Bubble Study  - Telemetry, EKG  - Bedside Glucose Monitoring  - A1c, Lipid Panel, Troponin x 3  - PT/OT/SLP  - PM&R  - Stroke Education  - Smoking Cessation  - Depression Screen  - Apnea Screen    Atrial fibrillation   - PTA digoxin   Hypertension:   - PTA Clonidine 0.1 mg BID   - PTA Lisinopril 10 mg daily     Peripheral neuropathy:   - PTA pregabalin 100 mg, QID     Migraine:    Ongoing currently   - Compazine 5 mg now   - Benadryl 12.5 mg now  - Holding sumatriptan     Chronic pain:   - PTA oxycodone-acetaminophen  mg q4 PRN   - Fentanyl patch     GERD:   - PTA omeprazole     During initial physical assessment, the plan of care was discussed and developed with patient.  Plan of care includes: stroke work-up.    Patient was admitted via direct admit.    The patient will be admitted to the Neuro Critical Care/Stroke team..     Other Medical Problems:  Arrythmia     Fluids/Electrolytes/Nutrition  0.9% sodium chloride @ 75 mL/hr  Avoid hypotonic fluids.    Nutrition:   Active Diet Order      Regular Diet Adult    Prophylaxis            For VTE Prevention:  - pneumatic compression device   - Allergy to heparin in the chart, and he cannot clarify what occurred when he received heparin. He does know that he cannot take heparin.     For Acid Suppression:  - GI prophylaxis is not indicated    Code Status  Full Code    HPI  Migel Stringer is a 68 year old right handed male with PMH of migraines, current cigar smoker, peripheral  neuropathy, hypertension, atrial fibrillation not anticoagulated, aortic stenosis, hyperlipidemia and chronic pain on opioids who presents via transfer from [facility] for left upper extremity weakness and numbness tingling with onset at 11:00 PM 7/31/18. First light obtained EKG showing afib and MRI/MRA showing small strokes in the left cerebellum and left occipital lobe and transferred to us for further stroke work-up. Reviewed the images and there are very small diffusion restriction findings in the left cerebellum and left parietal.     Other pertinent history is for migraine headache, which started yesterday morning s/p dose of sumatriptan yesterday ~11 AM. Headache persists now, severe with radiation behind his eyes. He also complains of severe back pain and requests his fentanyl patch to be replaced.      Pertinent Past Medical/Surgical History  Past Medical History:   Diagnosis Date     AAA (abdominal aortic aneurysm) (H)      Aortic stenosis      Asthma      Atrial fibrillation (H)      Chronic low back pain      Chronic nausea      GERD (gastroesophageal reflux disease)      Heart murmur      Heart rate problem      History of sleep apnea      Hyperlipidemia      Hypertension      Insomnia      Left renal mass      Migraines      Mitral regurgitation      Peripheral neuropathy      Renal mass      Status post gastric bypass for obesity      Ulcer, gastric, acute      Vomiting in adult        Past Surgical History:   Procedure Laterality Date     CHOLECYSTECTOMY       COLONOSCOPY       ESOPHAGOGASTRODUODENOSCOPY       GASTRIC BYPASS  2011     HERNIA REPAIR       SPINE SURGERY      hx of 6 lumbar surgeries and 1 thoracic surgery       Medications: I have reviewed this patient's current medications and ordered.     Allergies: All allergies reviewed and addressed.    Family History: I have reviewed this patient's family history.    Social History: I have reviewed this patient's social history.    Tobacco use:  Current smoker 1 cigar a day     ROS:  The 10 point Review of Systems is negative other than noted in the HPI or here.     PHYSICAL EXAMINATION  Vital Signs:  B/P: 168/105,  T: 97,  P: 62,  R: 14    General:  Lying in bed, looks uncomfortable and complains of back pain and headache.     HEENT:  normocephalic/atraumatic  Cardio: Irregular irregular, systolic murmur loudest over 2nd right intercostal   Pulmonary:  no respiratory distress  Abdomen:  soft, non-tender, non-distended  Extremities:  no edema  Skin:  intact     Neurologic  Mental Status:  fully alert, follows commands, oriented to situation, year, but not month (8 - then says October). Speech clear and fluent.    Cranial Nerves:  visual fields intact, PERRL, EOMI with normal smooth pursuit, facial sensation intact and symmetric, facial movements symmetric, hearing not formally tested but intact to conversation, palate elevation symmetric and uvula midline, no dysarthria, shoulder shrug strong bilaterally, tongue protrusion midline  Motor:  no abnormal movements, pronator drift of left arm. Strength 5/5 throughout except for left biceps 3+/5 and left triceps 4/5, intact distal strength in left upper extremity.   Reflexes:  Reflexes 2+ bilateral upper extremities and patellar. No achilles reflexes bilaterally. Toes mute   Sensory:  Reduced pinprick left upper extremity to upper forearm anterior and distal arm posterior. Numbness bilateral toes, chronically   Coordination: Dysmetria left hand FNF with missing to left by 2 inches, mild right hand dysmetria on repeat testing. Knee shin, normal.   Station/Gait:  Able to stand. No lightheadedness, no diplopia.     Labs  Labs and Imaging reviewed and used in developing the plan; pertinent results included.     Lab Results   Component Value Date    GLC 87 07/30/2018       The patient was discussed with the fellow, Dr. Sanchez.  The staff is Dr. Bosch.     Berenice Bernal MD  Pager: [phone]

## 2018-08-02 NOTE — PROGRESS NOTES
Randall's case was discussed at TAVR conference.    The plan will be for Randall to proceed with TAVR   Jones Valve  Size 29  Approach: Transfemoral    Additional testings/orders/information discussed:   It was also recommended that Randall have a MARLENE to evaluate for thrombus, as well as measurement for a possible Watchman device.  This information was shared with Yu.    Yu said that Randall was admitted to [facility] with a stroke.  The primary team will be contacted regarding the possibility of ordering of a MARLENE while he is in the hospital.

## 2018-08-02 NOTE — CONSULTS
GASTROENTEROLOGY CONSULTATION      Date of Admission:  8/1/2018           Reason for Consultation:   We were asked by Dr. Motley of neurology to evaluate this patient for risk of GI bleed in setting of anticoagulation.          ASSESSMENT AND RECOMMENDATIONS:   Assessment:  68 year old male with a history of morbid obesity now s/p Robel-En-Y gastric bypass (1/5/2011), GERD with hiatal hernia, gastric stricture, severe aortic stenosis, and Afib not on AC who presents to [facility] with new left upper extremity weakness. Pt has presented on multiple occasions with GI bleed and/or abdominal pain. Prior endoscopic evaluation has been notable for friable albiet non-bleeding anastomosis (4/25/18), adherent clot in efferent limb (5/1/17) and diffuse gastropathy (7/25/16). Per past endoscopic reports, patient also appears to have had ulceration in the jejunum and/or at the gastrojejunostomy and will need further clarification if this represents a single recurrent ulceration or multiple lesions.  This will need to be clarified with last endoscopist. He also has known gastric stenosis in cardia requiring multiple stent placements in the past month. The patient is currently being evaluated for anticoagulation in the setting of suspected cardioembolic events and atrial fibrillation, watchman device placement and possible TAVR.     Recommendations  - Okay to initiate anticoagulation from a GI standpoint  - Please resume PTA omeprazole 40 mg BID which will need to be continued after discharge  - Please obtain XR to confirm gastric stent placement  - Strongly emphasized smoking cessation given persistent risk for bleeding  - We will plan to discuss previous EGD findings with Dr. Ramsey who performed last endoscopy  - Avoid NSAIDS as able  - If patient develops GI bleed on AC, please alert GI and place on IV PPI     Thank you for involving us in this patient's care. GI will sign off at this time. Please do not hesitate to contact the  "GI service with any questions or concerns.     Pt care plan discussed with Dr. Jessica, GI staff physician.    Charles Alexander,   Internal Medicine, PGY-2  Pager [phone]    ATTENDING ATTESTATION:    REFERRING PROVIDER: Hiro  DATE SEEN: 8/2/18    Patient was discussed, seen, and examined by me, Migel Jessica. The plan of care and pertinent data/imaging were also reviewed with the GI Consult team and GI Fellow as well. Agree with the joint assessment and plan as delineated above.    Please contact me with any further questions.    Migel Jessica MD    [facility] - Department of Medicine  Division of Gastroenterology  [phone]      -------------------------------------------------------------------------------------------------------------------    History of Present Illness   68 year old male with a history of morbid obesity now s/p Robel-En-Y gastric bypass (1/5/2011), GERD with hiatal hernia, gastric stricture, severe aortic stenosis, and Afib not on AC who presents to [facility] with new left upper extremity weakness. Patient first appreciated lef upper extremity weakness and sensation deficits around 2300 on 7/31. He has a known history of atrial fibrillation, but has not been placed on anticoagulation secondary to history of recurrent GI bleeding. On admission, patient was found to have multiple small acute infarcts in left cerebellum and left occipital lobe believed to be secondary to embolic phenomenon from atrial fibrillation.     Patient does admit to two prior episodes of hematemesis, once in 2016 and the other in 2017. Patient has been noted to have friable material at anastomosis in 2018  and was found to have adherent clot in efferent limb in 2017. Per past endoscopic reports, patient also appears to have had ulceration in the jejunum and/or at the \"gastrojejunostomy\" and will need further clarification if this represents a single recurrent ulceration or multiple " lesions. Denies any prior episodes of melena or hematochezia. Does admit to using Excedrin 5-6 times per week. Reports starting smoking 1 ppd at 15 years old and has more recently transitioned to daily cigar. Admits to occasional margaritas.  Family history notable for liver cancer, but otherwise no CRC or GI malignancies. Currently denies any abdominal pain, changes in bowel habits, changes in appetite, recurrent hematemesis, or lightheadedness.       Past Medical History    Reviewed and edited as appropriate  Past Medical History:   Diagnosis Date     AAA (abdominal aortic aneurysm) (H)      Aortic stenosis      Asthma      Atrial fibrillation (H)      Chronic low back pain      Chronic nausea      GERD (gastroesophageal reflux disease)      Heart murmur      Heart rate problem      History of sleep apnea      Hyperlipidemia      Hypertension      Insomnia      Left renal mass      Migraines      Mitral regurgitation      Peripheral neuropathy      Renal mass      Status post gastric bypass for obesity      Ulcer, gastric, acute      Vomiting in adult        Past Surgical History   Reviewed and edited as appropriate   Past Surgical History:   Procedure Laterality Date     CHOLECYSTECTOMY       COLONOSCOPY       ESOPHAGOGASTRODUODENOSCOPY       GASTRIC BYPASS  2011     HERNIA REPAIR       SPINE SURGERY      hx of 6 lumbar surgeries and 1 thoracic surgery       Social History   Reviewed and edited as appropriate  Social History     Social History     Marital status:      Spouse name: N/A     Number of children: 4     Years of education: N/A     Occupational History      [employer]     Social History Main Topics     Smoking status: Former Smoker     Packs/day: 1.00     Years: 50.00     Types: Cigarettes, Cigars     Quit date: 2016     Smokeless tobacco: Never Used      Comment: quit cigarettes in 2016, but still smokes an occasional cigar( 5/31/2018)     Alcohol use No      Comment: rare      Drug use: No     Sexual activity: Not Currently     Partners: Female     Birth control/ protection: None     Other Topics Concern     Not on file     Social History Narrative       Family History     Reviewed and edited as appropriate  Family History   Problem Relation Age of Onset     Skin Cancer Mother      Chronic Obstructive Pulmonary Disease Mother      Diabetes Mother      Liver Cancer Father      Breast Cancer Father      Breast Cancer Sister      No Known Problems Brother      Diabetes Maternal Grandmother      Cancer Paternal Grandmother      unspecified cancer     No Known Problems Brother      Cancer Paternal Grandfather      unspecified cancer     Allergies   Reviewed and edited as appropriate     Allergies   Allergen Reactions     Heparin      Other reaction(s): Thrombocytopenia  Platelets dropped precipitously 12/29/17 during hospitalization. HIT antibodies were not checked        Prior to Admission Medications    Current Facility-Administered Medications   Medication     albuterol neb solution 2.5 mg     aspirin chewable tablet 81 mg     atorvastatin (LIPITOR) tablet 20 mg     cloNIDine (CATAPRES) tablet 0.1 mg     digoxin (LANOXIN) tablet 125 mcg     fentaNYL (DURAGESIC) 50 mcg/hr 72 hr patch 1 patch     fentaNYL (DURAGESIC) Patch in Place     [START ON 8/4/2018] fentaNYL (DURAGESIC) patch REMOVAL     fentaNYL (PF) (SUBLIMAZE) injection 25 mcg     flumazenil (ROMAZICON) injection 0.2 mg     hydrALAZINE (APRESOLINE) injection 10-20 mg     labetalol (NORMODYNE/TRANDATE) injection 10-20 mg     lidocaine (LMX4) cream     lidocaine 1 % 1 mL     lisinopril (PRINIVIL/ZESTRIL) tablet 10 mg     medication instruction     midazolam (VERSED) injection 0.5 mg     naloxone (NARCAN) injection 0.1-0.4 mg     naloxone (NARCAN) injection 0.1-0.4 mg     nicotine (NICODERM CQ) 14 MG/24HR 24 hr patch 1 patch     nicotine Patch in Place     [START ON 8/3/2018] nicotine patch REMOVAL     omeprazole (priLOSEC) CR capsule  40 mg     oxyCODONE-acetaminophen (PERCOCET)  MG per tablet 1 tablet     pregabalin (LYRICA) capsule 100 mg     senna-docusate (SENOKOT-S;PERICOLACE) 8.6-50 MG per tablet 1 tablet    Or     senna-docusate (SENOKOT-S;PERICOLACE) 8.6-50 MG per tablet 2 tablet     sodium chloride (PF) 0.9% PF flush 3 mL     sodium chloride (PF) 0.9% PF flush 3 mL     sodium chloride 0.9% infusion     sodium chloride 0.9% infusion     sodium chloride bacteriostatic 0.9 % flush 20 mL     sucralfate (CARAFATE) tablet 1 g      Prescriptions Prior to Admission   Medication Sig Dispense Refill Last Dose     DIGOXIN PO Take 125 mcg by mouth daily        albuterol (2.5 MG/3ML) 0.083% neb solution Inhale 2.5 mg into the lungs 2 times daily   Unknown at Unknown time     albuterol (PROAIR HFA/PROVENTIL HFA/VENTOLIN HFA) 108 (90 Base) MCG/ACT Inhaler Inhale 1-2 puffs into the lungs 2 times daily    7/29/2018 at Unknown time     aspirin-acetaminophen-caffeine (EXCEDRIN MIGRAINE) 250-250-65 MG per tablet Take 1 tablet by mouth as needed for headaches   Past Week at Unknown time     atorvastatin (LIPITOR) 20 MG tablet Take 20 mg by mouth every morning   7/29/2018 at Unknown time     cloNIDine (CATAPRES) 0.1 MG tablet Take 0.1 mg by mouth 2 times daily   7/30/2018 at 0530     diphenhydrAMINE-acetaminophen (TYLENOL PM)  MG tablet Take 6 tablets by mouth At Bedtime   Past Month at Unknown time     fentaNYL (DURAGESIC) 50 mcg/hr 72 hr patch Place 1 patch onto the skin every 72 hours    7/29/2018 at 2000     ipratropium - albuterol 0.5 mg/2.5 mg/3 mL (DUONEB) 0.5-2.5 (3) MG/3ML neb solution Inhale 3 mLs into the lungs 3 times daily   Past Week at Unknown time     lisinopril (PRINIVIL/ZESTRIL) 10 MG tablet Take 10 mg by mouth every morning   7/30/2018 at 0530     omeprazole (PRILOSEC) 40 MG capsule Take 1 capsule (40 mg) by mouth 2 times daily Take 30-60 minutes before a meal. 60 capsule 11 7/30/2018 at 0530     oxyCODONE-acetaminophen  (PERCOCET)  MG per tablet Take 1 tablet by mouth every 4 hours   7/30/2018 at 1100     pregabalin (LYRICA) 100 MG capsule Take 100 mg by mouth 4 times daily   7/30/2018 at 0530     prochlorperazine (COMPAZINE) 5 MG tablet Take 5 mg by mouth every 6 hours as needed    Past Week at Unknown time     sucralfate (CARAFATE) 1 GM tablet Take 1 g by mouth 4 times daily   Past Week at Unknown time     SUMAtriptan (IMITREX) 100 MG tablet Take 100 mg by mouth as needed   7/30/2018 at 0530     tiZANidine (ZANAFLEX) 4 MG tablet Take 4 mg by mouth as needed   Past Week at Unknown time        Review of Systems   A complete review of systems was performed and is negative except as noted in the HPI      Physical Exam   BP (!) 154/99  Pulse 74  Temp 98.5  F (36.9  C) (Oral)  Resp 18  SpO2 98%  Wt:   Wt Readings from Last 2 Encounters:   07/30/18 74.8 kg (165 lb)   07/19/18 72.2 kg (159 lb 3 oz)      Constitutional: cooperative, pleasant, not dyspneic/diaphoretic, no acute distress  Eyes: Sclera anicteric/injected  Ears/nose/mouth/throat: Normal oropharynx without ulcers or exudate, mucus membranes moist  Neck: supple, symmetric   CV: 3/6 systolic murmur, irregularly irregular  Respiratory: CTAB, non-labored  Abd: Nondistended, +bs, no hepatosplenomegaly, nontender, no peritoneal signs  Skin: no concerning rashes over exposed areas, no jaundice  Neuro: AAO x 3  Psych: Normal affect  MSK: No gross deformities    Data   Labs and imaging below were independently reviewed and interpreted    BMP  Recent Labs  Lab 08/02/18  0725 07/30/18  1148    141   POTASSIUM 4.1 4.6   CHLORIDE 108 108   SUZY 8.1* 8.1*   CO2 28 28   BUN 13 12   CR 0.69 0.72   GLC 87 87     CBC  Recent Labs  Lab 08/02/18  0725 07/30/18  1148   WBC 5.1 6.0   RBC 4.24* 4.42   HGB 11.8* 12.5*   HCT 36.9* 39.2*   MCV 87 89   MCH 27.8 28.3   MCHC 32.0 31.9   RDW 18.8* 19.7*   PLT 97* 109*     INR 1.11  LFTsNo lab results found in last 7 days.   PANCNo lab  results found in last 7 days.    Imaging:  MRA head/neck  Impression:  MRI Head:  1. Small acute infarcts are noted in the left cerebellum and left occipital lobe. No associated hemorrhage or mass effect at this time.   2. Small chronic cortical infarct in the right cerebellum, unchanged from 12/27/2017.   3. Mild small vessel ischemic changes again noted.     MRA Head:   1. Unremarkable exam.  2. No change from 12/27/2017.     MRA Neck:   1. No evidence for hemodynamically significant ICA stenosis by NASCET criteria.  2. No evidence for carotid or vertebral artery dissection in the neck.         Previous Endoscopy:   EGD 7/18/18:  Findings:       A medium-sized hiatal hernia was present (2-3cm). No esophagitis.       There is anatomy consistent with prior Robel-en-Y gastric bypass.       A benign-appearing, intrinsic moderate stenosis was found in the cardia.        This was traversed with therapeutic scope. This was stented with axios        stent, 20mm x 10mm under direct endoscopy and fluoroscopic guidance.        Ulcer at gastrojejunostomy.    Impression:         - Medium-sized hiatal hernia.                      - There is anatomy consistent with prior Robel-en-Y                       gastric bypass.                      - Gastric stenosis was found in the cardia; Axios stent                       placed under direct endoscopy and fluoroscopy assitance                       at the gastrojejunostomy with ease. There was associated                       ulcer at this site.  EGD 7/6/18:  Findings:       The esophagus was normal.       Evidence of a gastric bypass was found. A gastric pouch with a normal        size was found. The staple line appeared intact. The gastrojejunal        anastomosis was characterized by stenosis with partially migrated stent.        This was traversed after removal of stent. The stent was removed with a        rat tooth forceps. The pouch-to-jejunum limb was characterized by         healthy appearing mucosa. There is a tight angulation at 75 cm from the        incisors. The duodenum-to-jejunum limb was not examined as it could not        be reached.    Impression:         - Normal esophagus.                      - Gastric bypass with a normal-sized pouch and intact                       staple line. Gastrojejunal anastomosis characterized by                       stenosis with partially migrated stent. Stent was removed.                      - If an esophageal stent was later used, there is a tight                       angulation at 75 cm from the incisors which an esophageal                       would likely not be able to pass if it migrated.                      - No specimens collected.    EGD 7/6/18:  Findings:       The esophagus was normal.       Evidence of a gastric bypass was found. A gastric pouch with a normal        size was found. The staple line appeared intact. The gastrojejunal        anastomosis was characterized by moderate stenosis. There was a        previously placed stent at this location. This appeared to be roughly in        the same place as it was deployed. It may have moved distally slightly.        It is likely ineffective in it's current location but I do not have        images of what this area looked like prior to the stent. Photos taken.    Impression:         - Normal esophagus.                      - Gastric bypass with a normal-sized pouch and intact                       staple line. Gastrojejunal anastomosis characterized by                       moderate stenosis. There was a previously placed stent at                       this location. This appeared to be roughly in the same                       place as it was deployed. However, it may have moved                       distally slightly. It is likely ineffective in it's                       current location but I do not have images of what this                       area looked like prior to the  stent. Photos taken.                      - No specimens collected    EGD 6/20/18:  Impression:         - Normal gastroesophageal junction.                      - Chronic gastritis.                      - Gastric stenosis was found in the cardia. Stented with                       15mm Axios stent and dilated to 15mm.                      - No specimens collected.    Recommendation:     - Discharge patient to home (ambulatory).                      - Stent can be permanent.                      - Start pureed foods.    EGD 4/25/18:      EGD 5/1/17:        EGD 7/25/16:      Colon 8/4/09: Report unavailable

## 2018-08-02 NOTE — PLAN OF CARE
Problem: Patient Care Overview  Goal: Plan of Care/Patient Progress Review  PT 6A: Orders received for PT evaluation. Per discussion with OT, no IP PT needs. Pt ambulating and negotiating stairs with SBA, no LOB or strength deficits. PT order completed.

## 2018-08-02 NOTE — PROGRESS NOTES
Care Coordinator Progress Note    Admission Date/Time:  8/1/2018  Attending MD:  Zoe Bosch MD    Data  Chart reviewed, discussed with interdisciplinary team.   Patient was admitted for: Data Unavailable.    Concerns with insurance coverage for discharge needs: None.  Current Living Situation: Patient lives with spouse.  Support System: Supportive and Involved  Services Involved: None  Transportation at Discharge: Family or friend will provide  Transportation to Medical Appointments:   - Patient will transport himself to and from appointments  Barriers to Discharge: None    Coordination of Care and Referrals: Anticoagulation    Assessment  Pt admitted for stroke work up secondary to LUE weakness.   Per care team rounds anticipate discharge pending PT/OT/SLP recommendations.  Cardiology consulted for secondary to a-fib and TAVR evaluation.      PT/OT/SLP have cleared pt for discharge to home.  No f/u therapy needs noted.   Cardiology note indicates that TAVR work up will continue as an outpatient also allowing time for patient to recover from acute ischemic stroke.   Cardiology is recommending anticoagulation however deferring to primary Neurology team to address timing of when to start anticoagulation.       1600:  Paged Dr. Sanchez, Stroke Fellow regarding anticoagulation plan.      1615:  Met briefly with pt and spouse.   No concerns regarding plan to discharge home today.   Pt voiced no concerns about his ability to go into clinic for lab draws/follow up.  Awaiting confirmation of anticoagulation plan.   Pt and spouse request that prescriptions be sent to [facility].  Paged Dr. Sanchez, Stroke Fellow with pharmacy request.  Awaiting confirmation of anticoagulation plan.    1625:  Per discussion with Dr. Sanchez patient will be started on Rivaroxaban for anticoagulation.   No further needs noted.       Plan  Anticipated Discharge Date:  8/2/18  Anticipated Discharge Plan:  Home today    Lynne  Surinder RN BSN, PHN Oren RN Care Coordinator   [email or web address]  Pager [phone]  8/2/2018 4:34 PM

## 2018-08-02 NOTE — PROGRESS NOTES
To whomever it may concern   Migel Stringer is recommended not to return to work for 2 weeks following discharge from the hospital toady 8/2/2018.            Please call if any questions                    Lucero Hinson MD         8/2/2018

## 2018-08-02 NOTE — PROGRESS NOTES
Pt arrived in ECHO department  for scheduled MARLENE.   Procedure explained, questions answered and consent signed. Discharge instructions discussed with patient.  Pt's throat sprayed at 13:00, therefore pt will not be able to eat or drink until 2 hours after at 15:00 .  Informed pt and inpatient RN of this time and encouraged to start with warm fluids and soft foods.    Pt tolerated procedure well, and was given a total of 50 mcg IV fentanyl and 1.5 mg IV versed for conscious sedation.  Pt denied throat or chest pain after MARLENE complete.   MARLENE probe 52 used for procedure.  Pt transferred back to unit 6A via transport after awake and VSS. Report given to 6A RN.

## 2018-08-02 NOTE — CONSULTS
Physical Medicine and Rehabilitation Consultation note    Patient Name: Migel Stringer   YOB: 1950  MRN: [id number]     Age / Sex: 68 year old male    Reason for Consult: LUE weakness secondary to potential stroke    HISTORY OF PRESENTING ILLNESS:  This is a 68 year old, right handed, gentleman who presented to [facility] in [place] on 1 Aug with acute onset left upper extremity weakness and reduced sensation.  He has a past medical history of coronary artery disease, migraines, tobacco use, peripheral neuropathy, HTN, atrial fibrillation not anticoagulated, aortic stenosis, hyperlipidemia, chronic low back pain, with previous lumbar fusion and other surgeries treated with long standing opiods.    On 30 Jul, Mr. Stringer underwent a dobutamine stress test (where he developed a severe HA, CT done which did not show any bleed or other severe abnormalities).  He also underwent a cardiac catheterization for transcatheter aortic valve replacement and was noted to have 3 vessel CAD (RCA, LAD, and LCX).  No immediate complications noted after that procedure. On 31 Jul Mr. Stringer developed a migraine and took a dose of sumatriptan around 1100.  His last known normal was approx 31 Jul at 2300 and was seen by [facility] in [place] at 1840.  States he didn't want to bother his wife and wake her up, thus he delayed going to the ED.  Initial ECG showed atrial fibrillation and MRI showed small strokes in the left cerebellum and left occipital lobe.  He was transferred to [facility] for further stroke work-up.  Currently still pending MARLENE with bubble study and MRI/MRA.      Per patient he is feeling better without improved LUE weakness and resolved numbness.  No other deficits.  Pt has never had symptoms like these with his migraine.    Prior Level of Function: independent in all ADLS    Current Function:  Patient has been evaluated by therapy teams, and function noted:   PT- 2 Aug: no LOB or strength deficits, no  further interventions noted.  OT- 2 Aug: recommend ADL retraining, home program exercise and guidelines.  Recommended for home management  SLP- 2 Aug: pt is demonstrating adequate tolerance for regular diet and thin liquids, and pt is at baseline speech function.  No further speech services needed.    Patient's goals: return home    Presently, patient reports no other concerns. See ROS below for further subjective details.    REVIEW OF SYSTEMS:   Gen: feeling well, no fevers, no chills  HEENT: no congestion, no visual loss, no hearing loss  Resp: no shortness of breath, no cough  CVS: no chest pains, no palpitations  GI: no abd pain, no n/v, no diarrhea, no constipation  : no dysuria, voiding normally  Cognition: no memory difficulties, no problem solving difficulties, no speech or language difficulties  Sensation: no sensory abnormalities  Mobility: no focal weakness, normal balance  Pain: denies  Mood: normal mood    ALLERGIES:  Allergies   Allergen Reactions     Heparin      Other reaction(s): Thrombocytopenia  Platelets dropped precipitously 12/29/17 during hospitalization. HIT antibodies were not checked       PAST MEDICAL HISTORY:  Past Medical History:   Diagnosis Date     AAA (abdominal aortic aneurysm) (H)      Aortic stenosis      Asthma      Atrial fibrillation (H)      Chronic low back pain      Chronic nausea      GERD (gastroesophageal reflux disease)      Heart murmur      Heart rate problem      History of sleep apnea      Hyperlipidemia      Hypertension      Insomnia      Left renal mass      Migraines      Mitral regurgitation      Peripheral neuropathy      Renal mass      Status post gastric bypass for obesity      Ulcer, gastric, acute      Vomiting in adult        FAMILY HISTORY:  Family History   Problem Relation Age of Onset     Skin Cancer Mother      Chronic Obstructive Pulmonary Disease Mother      Diabetes Mother      Liver Cancer Father      Breast Cancer Father      Breast Cancer  Sister      No Known Problems Brother      Diabetes Maternal Grandmother      Cancer Paternal Grandmother      unspecified cancer     No Known Problems Brother      Cancer Paternal Grandfather      unspecified cancer     Reviewed    SOCIAL HISTORY:  Prior Level of Function: Prior to this, patient was previously independent    Occupation: / for the last 10 years  Hobbies: hunting and fishing    Substances: smoking cigars 1/day wants to quit, no alcohol use, or no drug use    Living situation: 4 steps to enter the house.  Bathroom on lower floor, takes 12 steps to get to it    Assistance available: Wife, active, works.      PHYSICAL EXAM:  Vitals: B/P: 150/111[RN aware. pt in pain[, T: 98.5, P: 84, R: 18  Gen: well appearing, no acute distress  HEENT: PERRLA, EOMI, normal conjunctivae, moist mucosae, normal speech  Resp: CTAB, good air entry  CVS: RRR, no murmurs rubs gallops  GI: S NT ND normoactive bowel sounds  MSK: Spontaneously moving all four limbs  Neuro: AOx3. CN intact. DTRs WNL. Neg clonus bilaterally.   MS: AAOx3, no speech difficulties noted  CN: II-XII intact, very slight weakness in left facial field  MS: 5/5 In RUE, LUE: SA 4/5, EF: 4/5, EF: 4/5, WE: 4/5,  5/5, Finger Abduction: 5/5  Reflexes: neg Tee, 2/4 at B, T, BR bilaterally.  Coor: no tremor noted, no coordination deficits noted  Sensation: intact to LT in all dermatomes of BUE  Gait: deferred  Ext: extremity pulses 2+, skin warm and well-perfused, no rashes or wounds noted    IMAGING:    See HPI, pending MRI/MRA, MARLENE    ASSESSMENT/PLAN:     68 year old male with acute onset LUE weakness and parasthesias, found to have left cerebellum and left occipital lobe acute infarcts.  Functionally, he is able to utilize his left arm to help support himself and perform most ADLs.  Due to the limited new onset deficits, Mr. Stringer may not be a good candidate for acute rehab at this time.  However, depending upon the  remainder of his hospital course, this decision may change.    1. LUE Weakness and sensation changes secondary to potential stroke.  Pending further studies.  On ASA 81 mg and atorvastatin 20 mg for secondary stroke prophylaxis.  Pending further work up and interventions, plan on discharge home with outpatient PT/OT.  2. Atrial Fibrillation, current on digoxin  3. HTN: clonidine, lisinopril  4. CAD: currently being evaluated for TVAR, defer to that team  5. Aortic Stenosis: currently being evaluated for TVAR, defer to that team  6. Chronic Migraine: cont on sumatriptan  7. Chronic low back pain: Fentanyl patch 50 mcg/hr, pregabalin 400 mg/daily  8. Tobacco Use: recommend cessation, currently on nicotine patch         Recommendations:  1. Education: Educated on need to go to an Emergency room immediately for any new onset weakness.  Also counseled on tobacco cessation and habit replacement.  2. Workup: Cont as directed by Neurology and Cardiology teams  3. Rehab disposition: At this time, Mr. Stringer is not appropriate for admission to acute rehab   4. Occupation Return Restrictions/Driving: no driving restrictions at this time.  5. Other: If hospital course changes significantly, plan on reevaluation for services.    Thank you for the consult. We will continue to follow along and offer any other recommendations.   Please feel free to call for any questions/concerns.     Case discussed with Dr. Sravan Painter DO  PGY-2 Resident, Physical Medicine and Rehabilitation  Pager [phone]

## 2018-08-02 NOTE — CONSULTS
Cardiology Inpatient Consultation  August 2, 2018    Reason for Consult:  A cardiology consult was requested by Dr. Patterson from the neuro service to provide clinical guidance regarding TAVR evaluation and anticoagulation for a-fib.    HPI:   Migle Stringer is a 68 year old male with a history of hypertension, hyperlipidemia, tobacco use, non-obstructive CAD, a-fib not anticoagulated due to history of GI bleed due to prior gastric bypass and severe aortic stenosis currently being evaluated for TAVR and watchman device who presented on 8/1 with left upper extremity weakness found to have small acute infarcts in the left cerebellum and left occipital lobe likely cardioembolic related to a-fib. Cardiology consulted for recommendations regarding timing of TAVR and anticoagulation for a-fib.     At the time of interview, the patient denies chest pain, dyspnea at rest or with exertion, orthopnea, PND, palpitations, lightheadedness, or syncope.     Review of Systems:    Complete review of systems was performed and negative except per HPI.    PMH:  Past Medical History:   Diagnosis Date     AAA (abdominal aortic aneurysm) (H)      Aortic stenosis      Asthma      Atrial fibrillation (H)      Chronic low back pain      Chronic nausea      GERD (gastroesophageal reflux disease)      Heart murmur      Heart rate problem      History of sleep apnea      Hyperlipidemia      Hypertension      Insomnia      Left renal mass      Migraines      Mitral regurgitation      Peripheral neuropathy      Renal mass      Status post gastric bypass for obesity      Ulcer, gastric, acute      Vomiting in adult      Active Problems:  Patient Active Problem List    Diagnosis Date Noted     Hypertension      Priority: Medium     Atrial fibrillation (H)      Priority: Medium     Stroke (H) 08/01/2018     Priority: Medium     S/P coronary angiogram 07/30/2018     Priority: Medium     Social History:  Social History   Substance Use Topics      Smoking status: Former Smoker     Packs/day: 1.00     Years: 50.00     Types: Cigarettes, Cigars     Quit date: 2016     Smokeless tobacco: Never Used      Comment: quit cigarettes in 2016, but still smokes an occasional cigar( 5/31/2018)     Alcohol use No      Comment: rare     Family History:  Family History   Problem Relation Age of Onset     Skin Cancer Mother      Chronic Obstructive Pulmonary Disease Mother      Diabetes Mother      Liver Cancer Father      Breast Cancer Father      Breast Cancer Sister      No Known Problems Brother      Diabetes Maternal Grandmother      Cancer Paternal Grandmother      unspecified cancer     No Known Problems Brother      Cancer Paternal Grandfather      unspecified cancer       Medications:    aspirin  81 mg Oral Daily     atorvastatin  20 mg Oral QAM     cloNIDine  0.1 mg Oral BID     digoxin  125 mcg Oral QAM     fentaNYL  50 mcg Transdermal Q72H     fentaNYL   Transdermal Q8H     [START ON 8/4/2018] fentaNYL   Transdermal Q72H     lisinopril  10 mg Oral QAM     nicotine  1 patch Transdermal Daily     nicotine   Transdermal Q8H     [START ON 8/3/2018] nicotine   Transdermal Daily     omeprazole  40 mg Oral QAM     pregabalin  100 mg Oral 4x Daily     senna-docusate  1 tablet Oral BID    Or     senna-docusate  2 tablet Oral BID     sodium chloride bacteriostatic  20 mL Intravenous Once     sucralfate  1 g Oral 4x Daily         - MEDICATION INSTRUCTIONS -       sodium chloride 75 mL/hr at 08/01/18 2142       Physical Exam:  Temp:  [97  F (36.1  C)-98.7  F (37.1  C)] 98.5  F (36.9  C)  Pulse:  [57-84] 67  Resp:  [14-18] 16  BP: (131-168)/() 131/101  SpO2:  [96 %-100 %] 97 %  No intake or output data in the 24 hours ending 08/02/18 1201  GEN: pleasant, no acute distress  HEENT: no icterus  CV: irregular, irregular 3/6 RICHELLE, JVP normal.   CHEST: clear to ausculation bilaterally, no rales or wheezing  ABD: soft, non-tender, normal active bowel sounds  EXTR: pulses  intact. No clubbing, cyanosis or edema.   NEURO: alert oriented, speech fluent/appropriate, motor grossly nonfocal    Diagnostics:  All labs and imaging were reviewed, of note:  Lab Results   Component Value Date     08/02/2018    Lab Results   Component Value Date    CHLORIDE 108 08/02/2018    Lab Results   Component Value Date    BUN 13 08/02/2018      Lab Results   Component Value Date    POTASSIUM 4.1 08/02/2018    Lab Results   Component Value Date    CO2 28 08/02/2018    Lab Results   Component Value Date    CR 0.69 08/02/2018        Lab Results   Component Value Date    WBC 5.1 08/02/2018    HGB 11.8 (L) 08/02/2018    HCT 36.9 (L) 08/02/2018    MCV 87 08/02/2018    PLT 97 (L) 08/02/2018       EKG 8/2:  A-fib with slow ventricular response HR 51    MARLENE 8/2: No evidence of BONITA thrombus     Transthoracic echocardiogram 8/2:     Left Ventricle  Normal left ventricular size and systolic function based on limited views.  Unable to perform complete wall motion analysis. Moderate concentric wall  thickening consistent with left ventricular hypertrophy is present.     Right Ventricle  The right ventricle is normal size. Global right ventricular function is  normal.     Atria  Moderate biatrial enlargement is present. The atrial septum is intact as  assessed by color Doppler and agitated saline bubble study .     Aortic Valve  Aortic valve is severely calcified and restricted.        Tricuspid Valve  Trace tricuspid insufficiency is present. The peak velocity of the tricuspid  regurgitant jet is not obtainable. Pulmonary artery systolic pressure cannot  be assessed.     Vessels  The inferior vena cava was dilated at 2.8 cm without respiratory variability,  consistent with increased right atrial pressure. Estimated mean right atrial  pressure is 15 mmHg (significantly elevated).     Pericardium  Trivial pericardial effusion is present.    Brain MRI 8/1:    Impression:  MRI Head:  1. Small acute infarcts are noted  in the left cerebellum and left occipital lobe. No associated hemorrhage or mass effect at this time.   2. Small chronic cortical infarct in the right cerebellum, unchanged from 12/27/2017.   3. Mild small vessel ischemic changes again noted.     MRA Head:   1. Unremarkable exam.  2. No change from 12/27/2017.     MRA Neck:   1. No evidence for hemodynamically significant ICA stenosis by NASCET criteria.  2. No evidence for carotid or vertebral artery dissection in the neck.    Assessment and Recommendation:  Migel Stringer is a 68 year old male with a history of hypertension, hyperlipidemia, tobacco use, non-obstructive CAD, a-fib not anticoagulated due to history of recurrent GI bleeds and severe aortic stenosis currently being evaluated for TAVR and watchman device who presented on 8/1 with left upper extremity weakness found to have small acute infarcts in the left cerebellum and left occipital lobe likely cardioembolic related to a-fib. Cardiology consulted for recommendations regarding timing of TAVR and anticoagulation for a-fib.     1. Severe aortic stenosis: The patient is currently undergoing outpatient evaluation for TAVR. We have been asked by the primary team if this can be performed during current hospitalization.There is no urgency for aortic valve replacement. Furthermore, we would like him to recover from his acute ischemic stroke before proceeding with any invasive procedures. Our TAVR coordinator Eloise Mccallum will be in contact with the patient and his wife in the coming weeks regarding timing of the procedure.     2. Atrial fibrillation: Known a-fib, not anticoagulated due to history of GI bleed 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 Currently undergoing evaluation for Watchman BONITA occluder device. MARLENE today shows no evidence of BONITA thrombus; however, this does not rule-out cardioembolic stroke. Given high [id number] (4) we would recommend initiation of anticoagulation for stroke  prophylaxis. Will defer timing of initiation to primary neurology team. Would also obtain GI consult regarding history of GI bleed. Recommend discontinuation of digoxin given baseline bradycardia.     3. Hypertension: Increase lisinopril to 20 mg daily. Started amlodipine 5 mg daily. Wean off clonidine. Upon discharge decrease clonidine to .1 mg x 3 days, then .05 mg x 3 days then discontinue. If home systolic BP > 160, increase amlodipine to 10 mg daily. Follow-up with PCP in 10 days for BP check and labs.       I have discussed the above with Dr. Fernando.    Thank you for consulting the cardiovascular services at the [facility]. Please do not hesitate to call us with any questions.     JUAN CARLOS Fox, CNP  [facility] Cardiology Consult Team  Pager [phone] or [phone]      I interviewed patient:  the patient denies chest pain, dyspnea at rest or with exertion, orthopnea, PND, palpitations, lightheadedness, or syncope.   ROS is negative  I examined the patient     Temp:  [97  F (36.1  C)-98.7  F (37.1  C)] 98.5  F (36.9  C)  Pulse:  [57-84] 67  Resp:  [14-18] 16  BP: (131-168)/() 131/101  SpO2:  [96 %-100 %] 97 %      GEN: pleasant, no acute distress  HEENT: no icterus  CV: irregular, irregular 3/6 RICHELLE, JVP normal.   CHEST: clear to ausculation bilaterally, no rales or wheezing  ABD: soft, non-tender, normal active bowel sounds  EXTR: pulses intact. No clubbing, cyanosis or edema.   NEURO: alert oriented, speech fluent/appropriate, motor grossly nonfocal    I reviewed results labs, EKG and echocardiogram       I discussed our assessment and plan  with patient and JUAN CARLOS Fox, CNP      1. Severe aortic stenosis: The patient is currently undergoing outpatient evaluation for TAVR. We have been asked by the primary team if this can be performed during current hospitalization.There is no urgency for aortic valve replacement. Furthermore, we would like him to recover from  his acute ischemic stroke before proceeding with any invasive procedures. Our TAVR coordinator Eloise Mccallum will be in contact with the patient and his wife in the coming weeks regarding timing of the procedure.      2. Atrial fibrillation: Known a-fib, not anticoagulated due to history of GI bleed 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 Currently undergoing evaluation for Watchman BONITA occluder device. MARLENE today shows no evidence of BONITA thrombus; however, this does not rule-out cardioembolic stroke. Given high [id number] (4) we would recommend initiation of anticoagulation for stroke prophylaxis. Will defer timing of initiation to primary neurology team. Would also obtain GI consult regarding history of GI bleed. Recommend discontinuation of digoxin given baseline bradycardia.      3. Hypertension: Increase lisinopril to 20 mg daily. Started amlodipine 5 mg daily. Wean off clonidine. Upon discharge decrease clonidine to .1 mg x 3 days, then .05 mg x 3 days then discontinue. If home systolic BP > 160, increase amlodipine to 10 mg daily. Follow-up with PCP in 10 days for BP check and labs.        Jalen Fernando MD, PhD  Professor of Medicine  Division of Cardiology

## 2018-08-02 NOTE — PLAN OF CARE
Problem: Patient Care Overview  Goal: Plan of Care/Patient Progress Review  Pt here for ischemic stroke workup, vs ex HTN w/in parameters and zi, neuros include: a/o x4, 5/5 throughout, RLE N/T @ baseline, L eye blurry and double @ baseline (legally blind per pt). Pt up ad neno, PIV SL, regular diet @ 1500, had MARLENE procedure done this afternoon, no complaints after returning to 6A, PRN/scheduled pain meds round-the-clock for lower back pain w/moderate relief. Pt had PLC for stroke done this afternoon, GI and cardiology both consulted and will see pt, pt's off CCM as of 1100. Will most likely discharge this evening, continue to monitor per MD orders.

## 2018-08-02 NOTE — PLAN OF CARE
Problem: Stroke (Ischemic) (Adult)  Goal: Signs and Symptoms of Listed Potential Problems Will be Absent, Minimized or Managed (Stroke)  Signs and symptoms of listed potential problems will be absent, minimized or managed by discharge/transition of care (reference Stroke (Ischemic) (Adult) CPG).  Outcome: No Change  Status: pt on 6a for LUE weakness/numbness to r/o stroke  VS: zi between 40-60, CCM in place. HTN.   Neuros: LUE weakness/numbness,   GI: Reg diet, tolerating well.  : Voids spontaneously. No BM this shift.?  IV: PIV with NS infusing at 75 ml/hr  Activity: Up stand by  Pain: migraine headache, chronic lower back pain  Respiratory/Trach: Satting on RA   Skin: cuts/scabs on hands, tattoos   Plan of care: Continue to follow recommendations for stroke workup.

## 2018-08-02 NOTE — PLAN OF CARE
Problem: Patient Care Overview  Goal: Plan of Care/Patient Progress Review  Orders received for speech and swallowing evaluations. Pt demonstrates adequate tolerance for regular consistency diet with thin liquids as well as baseline speech function. Discussed with MD. Will defer evaluation at this time. Please reconsult as needed.

## 2018-08-02 NOTE — DISCHARGE SUMMARY
[facility], [place]    Neurology Stroke Discharge Summary    Date of Admission: 8/1/2018  Date of Discharge: 8/2/2018    Disposition: Discharged to home  Primary Care Physician: Joel Dorado      Admission Diagnosis:   Acute Ischemic stroke    Discharge Diagnosis:   Ischemic Stroke due to cardioembolism    Problem Leading to Hospitalization (from [facility]):   Migel Stringer is a 68 year old right handed male with PMH of migraines, current cigar smoker, peripheral neuropathy, hypertension, atrial fibrillation not anticoagulated, aortic stenosis, hyperlipidemia and chronic pain on opioids who presents with left upper extremity weakness in biceps and triceps along and reduced sensation in left upper extremity. On exam he also has left hand dysmetria. Outside imaging shows diffusion restriction in the left cerebellum and left parietal that I reviewed and is quite small. Unsure that this is correlating with his symptoms and wonder if part of his symptoms are exacerbated by ongoing migraine. He has several risk factors for stroke including known atrial fibrillation, not on anticoagulation. He was a admitted for stroke work up    Please see H&P dated 8/1/2018 for further details about presentation.    Brief Hospital Course:   Patient presented with Weakness and reduced sensation in the left arm.  He brought an MRI from OSH that revealed two lacunar infarcts in the left cerebellum and left occipital lobe. He has Afib. 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 He also has severe aortic stenosis and will get watchman  Procedure and TAVR. Given that he had stroke in the context of Afib, he should be on anticoagulation. GI team was consulted to evaluate the risk of bleeding and safety of starting anticoagulant. Their recommendation was as follow:  - Okay to initiate anticoagulation from a GI standpoint  - Please resume  PTA omeprazole 40 mg BID which will need to be continued after discharge  - Please obtain XR to confirm gastric stent placement  - Strongly emphasized smoking cessation given persistent risk for bleeding  - We will plan to discuss previous EGD findings with Dr. Ramsey who performed last endoscopy  - Avoid NSAIDS as able  - If patient develops GI bleed on AC, please alert GI and place on IV PPI   Cardiology team was consulted for recommendations bout the timing of the TAVR and anticoagulation for a-fib and for the findings of MARLENE. They recommended that :  1. Severe aortic stenosis: The patient is currently undergoing outpatient evaluation for TAVR. We have been asked by the primary team if this can be performed during current hospitalization.There is no urgency for aortic valve replacement. Furthermore, we would like him to recover from his acute ischemic stroke before proceeding with any invasive procedures. Our TAVR coordinator Eloise Mccallum will be in contact with the patient and his wife in the coming weeks regarding timing of the procedure.      2. Atrial fibrillation: Known a-fib, not anticoagulated due to history of GI bleed 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 Currently undergoing evaluation for Watchman BONITA occluder device. MARLENE today shows no evidence of BONITA thrombus; however, this does not rule-out cardioembolic stroke. Given high [id number] (4) we would recommend initiation of anticoagulation for stroke prophylaxis. Will defer timing of initiation to primary neurology team. Would also obtain GI consult regarding history of GI bleed. Recommend discontinuation of digoxin given baseline bradycardia.      3. Hypertension: Increase lisinopril to 20 mg daily. Started amlodipine 5 mg daily. Wean off clonidine. Upon discharge decrease clonidine to .1 mg x 3 days, then .05 mg x 3 days then discontinue. If home systolic BP > 160, increase amlodipine to 10 mg daily. Follow-up with PCP in 10 days for BP check  and labs.     From the neurology standpoint the patient needs anticoagulation to prevent further strokes. The group decision was to start Apixaban 5 mg bid, changed his antihypertensive medications and stopping digoxin. Although the risk of bleeding is there given the history of previous GI bleeding, the benefit of the anticoagulant is above the risk of bleeding.     Found to have lacunar infarcts in the left occipital region and left cerebellum      IV tPA was not given     Work-up as stated below under Pertinent Investigations.    Etiology is thought to be cardioembolic     Rehab evaluation: No rehab services required due to lack of ongoing deficit. is recommended not to return to work for [facility]    Smoking Cessation: already quit smoking    BP Long-term Goal: 140/90 or less    Antithrombotic/Anticoagulant Agent: apixaban (Eliquis)    Statins: continue home dose of the atorvastatin       Hgb A1C Goal: < 7.0    Complications: None.     Other problems addressed during this hospitalization:  # Gastric ulcer and previous history of bleeding: GI team has seen the patient and they recommended PPI and avoid ASA    #Afib:  - Apixaban    # Bradycardia:  -Stop Digoxin    # Hypertension  - Weaning clonidine and increase the dose of lisinopril after discharge    PERTINENT INVESTIGATIONS    Labs  Lipid Panel:   Recent Labs   Lab Test  08/02/18   0725   CHOL  108   HDL  42   LDL  47   TRIG  91     A1C:   Lab Results   Component Value Date    A1C 5.4 08/01/2018     INR: No lab results found in last 7 days.   Coag Panel / Hypercoag Workup: Not indicated  Pending test results: none    Echo:   Recent Results (from the past 4320 hour(s))   Echo limited bubble    Narrative    [id number]  [id number]  [id number]  [id number]^COLT^RAAD^           [facility],[place]  Echocardiography Laboratory  [address] [place] [id number]     Name: ZIA DIALLO  TERRIE  MRN: [id number]  : 1950  Study Date: 2018 09:35 AM  Age: 68 yrs  Gender: Male  Patient Location: [facility]  Reason For Study: TIA  Ordering Physician: RAAD GREGORY  Referring Physician: SELF, REFERRED MARY  Performed By: [facility] Veena Clemens     BSA: 1.9 m2  Height: 70 in  Weight: 165 lb  BP: 150/111 mmHg  _____________________________________________________________________________  __        Procedure  Bubble Limited Echocardiogram with portions of two-dimensional, color and  spectral Doppler performed.  _____________________________________________________________________________  __        Interpretation Summary  Limited study.  The atrial septum is intact as assessed by color Doppler and agitated saline  bubble study.  IVC is dilated  _____________________________________________________________________________  __        Left Ventricle  Normal left ventricular size and systolic function based on limited views.  Unable to perform complete wall motion analysis. Moderate concentric wall  thickening consistent with left ventricular hypertrophy is present.     Right Ventricle  The right ventricle is normal size. Global right ventricular function is  normal.     Atria  Moderate biatrial enlargement is present. The atrial septum is intact as  assessed by color Doppler and agitated saline bubble study .     Aortic Valve  Aortic valve is severely calcified and restricted.        Tricuspid Valve  Trace tricuspid insufficiency is present. The peak velocity of the tricuspid  regurgitant jet is not obtainable. Pulmonary artery systolic pressure cannot  be assessed.     Vessels  The inferior vena cava was dilated at 2.8 cm without respiratory variability,  consistent with increased right atrial pressure. Estimated mean right atrial  pressure is 15 mmHg (significantly elevated).     Pericardium  Trivial pericardial effusion is  present.  _____________________________________________________________________________  __                          Report approved by: Duane Hoyt 2018 12:36 PM              _____________________________________________________________________________  __      Dobutamine Stress Echocardiogram    Narrative    [id number]  [id number]  [id number]  [id number]^MIS^POLINA^           [facility],[place]  Echocardiography Laboratory  [address] [place] [id number]     Name: ZIA DIALLO  MRN: [id number]  : 1950  Study Date: 2018 09:54 AM  Age: 68 yrs  Gender: Male  Patient Location: [facility]  Reason For Study: Severe aortic stenosis  Ordering Physician: POLINA ABEBE  Referring Physician: POLINA ABEBE  Performed By: Ramonita Purcell RDCS, RVT     BSA: 1.9 m2  Height: 69 in  Weight: 158 lb  HR: 50  BP: 139/82 mmHg  _____________________________________________________________________________  __     _____________________________________________________________________________  __        Interpretation Summary  Conclusion: inconclusive dobutamine echocardiogram stress test due to early  termination of study and failure to increase stroke volume to assess change in  mean gradients across aortic valve.     Patient developed severe headache, vision change and neck pain during  dobutamine infusion, prompting termination of study.  After low dose dobutamine initation, there was an abnormal drop in HR (38 bpm)  and exaggerated BP response (215/100). Only 62% MPHR achieved.     Baseline echocardiogram demonstrates an aortic root size of 3.8 cm, calcified  aortic valve with moderately limited mobility. SVI 58 ml/m2 which is normal.  Mean gradient averaged over 5 beats is 20 mmHg. DULCE by continuity using LVOT  diameter of 2.5 cm is 1.5 cm2. Findings consistent with normal flow, moderate  AS.  At 5 mcg dobutamine, LVOT VTI did not increase  and SVI decreases to 55 ml/m2  due to abnormal drop in HR. Mean gradient averaged over 4 beats is 20 mmHg.  DULCE by continuity is 1.6 cm2.  At 10 mcg dobutamine, LVOT VTI dropped and SVI decreases further to 48 ml/m2  likely in setting of severe hypertension. Mean gradient averaged over 5 beats  is 24 mmHg. DULCE by continuity is 1.5 cm2.     EKG at baseline with old septal infarct. With dobutamine infusion, there are  frequent runs of non-sustained ventricular tachycardia.     Additional echocardiogram imaging post-infusion during severe symptoms  demonstrated no dissection flap or changes in aortic root or ascending aorta,  or proximal descending aorta.     Ordering cardiologist notified and stat CT head was performed. IV beta blocker  and hydralazine administrated.        _____________________________________________________________________________  __     Stress  The patient exhibited headache during the drug infusion.  Drug Infusion stopped secondary to arrhythmia.  The maximum dose of dobutamine was 10mcg/kg/min.  The maximum dose of metoprolol was 2mg.  10mg Hydralazine.     Stress Results                                       Maximum Predicted HR:   151 bpm             Target HR: 128 bpm        % Maximum Predicted HR: 62 %                             Stage DurationHeart Rate   BP                                 (mm:ss)   (bpm)                        Baseline            50     139/82                          5 MCG   5:00      38     130/60                         10 MCG   5:00      57    215/100                           Stress Duration:   14:01 mm:ss *                     Maximum Stress HR: 94 bpm *     Procedure  Dobutamine Echo Complete.  _____________________________________________________________________________  __     MMode/2D Measurements & Calculations  IVSd: 1.4 cm  LVIDd: 4.1 cm  LVIDs: 3.0 cm  LVPWd: 1.2 cm  FS: 26.2 %  LV mass(C)dI: 98.7 grams/m2  asc Aorta Diam: 3.8 cm  LVOT diam: 2.5 cm  LVOT  area: 4.9 cm2  RWT: 0.58                    _____________________________________________________________________________  __        Report approved by: Duane Ferreira 2018 02:27 PM        MARLENE  [id number]  ECH46  [id number]  [id number]^JAZMYN^PALLAVI^           [facility]  Echocardiography Laboratory  [address] [place] [address]        Name: ZIA DIALLO  MRN: [id number]  : 1950  Study Date: 2018 01:02 PM  Age: 68 yrs  Gender: Male  Patient Location: [facility]  Reason For Study: AfibCerebrovascular Incident  History: Rule out cardiac thrombus, Watchman workup  Ordering Physician: PALLAVI ELDRIDGE  Referring Physician: SELF, REFERRED MARY  Performed By: Holden Rudolph MD     BSA: 1.9 m2  Height: 70 in  Weight: 165 lb  HR: 57  BP: 150/87 mmHg  Attestation  I was present during MARLENE probe placement by the fellow, Holden Rudolph. I  personally viewed the imaging and agree with the interpretation and report as  documented by the fellow.  _____________________________________________________________________________  __     Interpretation Summary  The left atrial appendage is normal. It is free of spontaneous echo contrast  and thrombus.  Ostium diameter is 2.3 cm, depth is 3.0 cm.  Global and regional left ventricular function is normal with an EF of 55-60%.  The right ventricle is normal size. Global right ventricular function is  normal.  Small PFO detected by bubble study.              _____________________________________________________________________________  __        Procedure  The procedure was performed in the Echo Lab. Informed consent for  Transesophegeal echo obtained. MARLENE Probe #[id number] was used during the procedure. I  determined this patient to be an appropriate candidate for the planned  sedation and procedure and have reassessed the patient immediately prior to  sedation and procedure. Total sedation time: 25 minutes of continuous  bedside  1:1 monitoring. The Transducer was inserted without difficulty . Complications  None. The patient tolerated the procedure well.     Left Ventricle  Normal left ventricular size. Mild concentric wall thickening consistent with  left ventricular hypertrophy is present. Global and regional left ventricular  function is normal with an EF of 55-60%.     Right Ventricle  The right ventricle is normal size. Global right ventricular function is  normal.     Atria  The left atrial appendage is normal. It is free of spontaneous echo contrast  and thrombus. The right atria appears normal. Mild left atrial enlargement is  present. Small PFO detected by bubble study.        Mitral Valve  Mitral leaflet thickness is increased . Mild mitral insufficiency is present.     Aortic Valve  The aortic valve is tricuspid. Patient has known moderate aortic stenosis.  Doppler interrogation not done.     Tricuspid Valve  Mild tricuspid insufficiency is present.     Pulmonic Valve  The pulmonic valve is normal.     Vessels  Sinuses of Valsalva 3.5 cm. Ascending aorta 3.9 cm. Annulus 2.6 cm. Mild  dilatation of the aorta is present.     Pericardium  No pericardial effusion is present.        Miscellaneous  Unable to obtain transgastric views.    Imaging:   CT HEAD W/O CONTRAST 7/30/2018 11:13 AM     Provided History: CT head without contrast, headache/blurry vision  with SBP>200; Headache disorder  ICD-10: Headache disorder     Comparison: None.     Technique: Using multidetector thin collimation helical acquisition  technique, axial, coronal and sagittal CT images from the skull base  to the vertex were obtained without intravenous contrast.      Findings:    No intracranial hemorrhage, mass effect, or midline shift. The  ventricles are proportionate to the cerebral sulci with mild cerebral  volume loss globally. Mild leukoaraiosis. The gray to white matter  differentiation of the cerebral hemispheres is preserved. The  basal  cisterns are patent. Minimal bilateral basal ganglia calcifications.     The visualized paranasal sinuses are clear. The mastoid air cells are  clear.          Impression: No acute intracranial pathology.    MRI from OSH: was seen by us and outside hospital report. It showed left lacunar strokes in the cerebellum and occipital regions    Endovascular procedure: None     Cardiac Monitoring: Patient had > 24 hrs of cardiac monitor while in hospital.    Findings: Patient has history of atrial fibrillation and is on not on anticoagulation prior to admission .     Sleep Apnea Screen:   Questions/Answers      1. Prior to your stroke, have you been told that you snore? No.    2. Prior to your stroke, have you been told that you struggle to breath while you are sleeping? No.    3. Prior to your stroke, do you feel tired and sleepy even after getting a normal night of sleep? Yes.    Sleep Apnea Screen Findings: Patient has 0-1 symptoms of sleep apnea.  Further sleep study is not recommended at this time.    Education discussed with: patient and spouse on blood pressure management, cholesterol management and medical management.    During daily rounds, the plan of care was discussed and developed with patient and spouse.  Plan of care includes: stop digoxin, wean him off clonidin per cardiology recommendation, increase the dose of Lisinopril, start Apixapan and follow with the PCP for medication changes and management.    PHYSICAL EXAMINATION  Vital Signs:  B/P: 119/74, T: 97.4, P: 86, R: 16    General:  patient lying in bed without any acute distress     HEENT:  normocephalic/atraumatic  Cardio:  RRR  Pulmonary:  no respiratory distress  Abdomen:  soft  Extremities:  no edema  Skin:  intact     Neurologic  Mental Status:  fully alert  Cranial Nerves:  visual fields intact, PERRL, EOMI with normal smooth pursuit, facial sensation intact and symmetric, facial movements symmetric, hearing not formally tested but intact to  conversation, palate elevation symmetric and uvula midline, no dysarthria, shoulder shrug strong bilaterally, tongue protrusion midline  Motor:  no abnormal movements, normal tone throughout, normal muscle bulk, no pronator drift, normal and symmetric rapid finger tapping, able to move all limbs spontaneously, strength 5/5 throughout upper and lower extremities  Reflexes:  2+ and symmetric throughout  Sensory:  intact/symmetric to light touch and pin prick throughout upper and lower extremities  Coordination:  FNF and HS intact without dysmetria. Very minimal dysmetria in the left UE  Station/Gait:  normal width, stride length, turn, and arm swing     National Institutes of Health Stroke Scale (on day of discharge)  NIHSS Total Score: 1    Modified [place] Scale (on day of discharge): 0-No symptoms    Medications    Discharge Medication List as of 8/2/2018  6:15 PM      START taking these medications    Details   apixaban ANTICOAGULANT (ELIQUIS) 5 MG tablet Take 1 tablet (5 mg) by mouth 2 times daily, Disp-30 tablet, R-3, Local Print         CONTINUE these medications which have NOT CHANGED    Details   albuterol (2.5 MG/3ML) 0.083% neb solution Inhale 2.5 mg into the lungs 2 times daily, Historical      albuterol (PROAIR HFA/PROVENTIL HFA/VENTOLIN HFA) 108 (90 Base) MCG/ACT Inhaler Inhale 1-2 puffs into the lungs 2 times daily , Historical      aspirin-acetaminophen-caffeine (EXCEDRIN MIGRAINE) 250-250-65 MG per tablet Take 1 tablet by mouth as needed for headaches, Historical      atorvastatin (LIPITOR) 20 MG tablet Take 20 mg by mouth every morning, Historical      diphenhydrAMINE-acetaminophen (TYLENOL PM)  MG tablet Take 6 tablets by mouth At Bedtime, Historical      fentaNYL (DURAGESIC) 50 mcg/hr 72 hr patch Place 1 patch onto the skin every 72 hours , Historical      ipratropium - albuterol 0.5 mg/2.5 mg/3 mL (DUONEB) 0.5-2.5 (3) MG/3ML neb solution Inhale 3 mLs into the lungs 3 times daily, Historical       omeprazole (PRILOSEC) 40 MG capsule Take 1 capsule (40 mg) by mouth 2 times daily Take 30-60 minutes before a meal., Disp-60 capsule, R-11, E-Prescribe      oxyCODONE-acetaminophen (PERCOCET)  MG per tablet Take 1 tablet by mouth every 4 hours, Historical      pregabalin (LYRICA) 100 MG capsule Take 100 mg by mouth 4 times daily, Historical      prochlorperazine (COMPAZINE) 5 MG tablet Take 5 mg by mouth every 6 hours as needed , Historical      sucralfate (CARAFATE) 1 GM tablet Take 1 g by mouth 4 times daily, Historical      SUMAtriptan (IMITREX) 100 MG tablet Take 100 mg by mouth as needed, Historical      tiZANidine (ZANAFLEX) 4 MG tablet Take 4 mg by mouth as needed, Historical      lisinopril (PRINIVIL/ZESTRIL) 10 MG tablet Take 10 mg by mouth every morning, Historical         STOP taking these medications       cloNIDine (CATAPRES) 0.1 MG tablet Comments:   Reason for Stopping:         digoxin (LANOXIN) 125 MCG tablet Comments:   Reason for Stopping:         DIGOXIN PO Comments:   Reason for Stopping:               Additional recommendations and follow up:      Reason for your hospital stay   History of stroke in the context of Afib. He also had history of GI bleeding form gastrojujenal ulcer. He was admitted for stroke work up, TTE, MARLENE, cardiology consult and GI consult to evaluate for the risk of anticoagulation     Adult [facility] Follow-up and recommended labs and tests   Follow up with primary care provider, Joel Dorado, within 7 days to evaluate medication change.  No follow up labs or test are needed.      Appointments on [place] and/or [facility] (with [facility] or [facility] provider or service). Call [phone] if you haven't heard regarding these appointments within 7 days of discharge.     Activity   Your activity upon discharge: ambulate in house     Discharge Instructions   For the Atrial fibrillation: Known a-fib, not anticoagulated due to history of GI bleed related to prior  gastric bypass surgery. we would recommend initiation of anticoagulation for stroke prophylaxis. Will defer timing of initiation to primary neurology team. Would also obtain GI consult regarding history of GI bleed. Recommend discontinuation of digoxin given baseline bradycardia.      Hypertension:   - Increase lisinopril to 20 mg daily.   - Started amlodipine 5 mg daily.   - Wean off clonidine: decrease clonidine to 0.1 mg x 3 days, then 0.05 mg x 3 days then discontinue. If home systolic BP > 160, increase amlodipine to 10 mg daily. Follow-up with PCP in 10 days for BP check and labs.     For the stroke and Afib: start Apixaban 5 mg two times per day     Full Code     Diet   Follow this diet upon discharge: Orders Placed This Encounter     Regular Diet Adult         Patient was seen and discussed with the Attending, Dr. Bosch.    Jaime Ghotra-Sayed Afeefy  Pager: [phone]      Physician Attestation   I, Zoe Bosch, saw and evaluated this patient prior to discharge.  I discussed the patient with the resident and/or medical student and agree with plan of care as documented in the note.      I personally reviewed vital signs, medications, labs and imaging.    I personally spent 25 minutes on discharge activities.    Zoe Bosch MD  Date of Service (when I saw the patient): 8/2/2018

## 2018-08-02 NOTE — PLAN OF CARE
Problem: Patient Care Overview  Goal: Plan of Care/Patient Progress Review  Outcome: Improving  Admit for decreasing sensation to LUE, increasing LUE weakness and ataxia, TIA work up. VSS ex bradycardic, irregular apical pulse. CCM zi afib at baseline. Neuros intact ex. LUE numbness. Alert and oriented x4. PIV SL. Regular diet. Up SBA. Voids without difficulty. Plan echo bubble study today 8/2/18. Continue to monitor.

## 2018-08-02 NOTE — PROGRESS NOTES
08/02/18 1020   Quick Adds   Type of Visit Initial Occupational Therapy Evaluation   Living Environment   Lives With spouse   Living Arrangements house   Home Accessibility stairs to enter home;stairs within home   Number of Stairs to Enter Home 4   Number of Stairs Within Home 12   Living Environment Comment Pt is required to do stairs to reach bathroom.    Self-Care   Usual Activity Tolerance moderate   Current Activity Tolerance moderate   Activity/Exercise/Self-Care Comment Pt reports walking household distances without issue at baseline. Pt reports slight baseline gait instability.   Functional Level Prior   Ambulation 0-->independent   Transferring 0-->independent   Toileting 0-->independent   Bathing 0-->independent   Dressing 0-->independent   Eating 0-->independent   Communication 0-->understands/communicates without difficulty   Swallowing 0-->swallows foods/liquids without difficulty   Which of the above functional risks had a recent onset or change? none   Prior Functional Level Comment Pt reports IND with ADL at [facility].       Present no   General Information   Onset of Illness/Injury or Date of Surgery - Date 08/01/18   Referring Physician Berenice Bernal MD   Patient/Family Goals Statement Return home   Additional Occupational Profile Info/Pertinent History of Current Problem Migel Stringer is a 68 year old right handed male with PMH of migraines, current cigar smoker, peripheral neuropathy, hypertension, atrial fibrillation not anticoagulated, aortic stenosis, hyperlipidemia and chronic pain on opioids who presents with left upper extremity weakness in biceps and triceps along and reduced sensation in left upper extremity. On exam he also has left hand dysmetria. Outside imaging shows diffusion restriction in the left cerebellum and left parietal that I reviewed and is quite small. Unsure that this is correlating with his symptoms and wonder if part of his symptoms  are exacerbated by ongoing migraine. He has several risk factors for stroke including known atrial fibrillation, not on anticoagulation; work-up to be completed by stroke service.   Precautions/Limitations no known precautions/limitations   General Observations Pt supine in bed upon arrival, agreeable to therapy.   General Info Comments Activity: up with assist   Cognitive Status Examination   Orientation orientation to person, place and time   Level of Consciousness alert   Able to Follow Commands WNL/WFL   Cognitive Comment No cog issues reported at time of eval   Visual Perception   Visual Perception Comments Legally blind in left eye at baseline.   Sensory Examination   Sensory Quick Adds No deficits were identified   Sensory Comments Pt reports LUE has returned to normal.   Pain Assessment   Patient Currently in Pain Yes, see Vital Sign flowsheet   Range of Motion (ROM)   ROM Quick Adds No deficits were identified   Strength   Strength Comments Gross symmetrical UE strength.   Hand Strength   Hand Strength Comments gross symmetrical strength   Transfer Skill: Sit to Stand   Level of [place]: Sit/Stand independent   Instrumental Activities of Daily Living (IADL)   Previous Responsibilities work;driving;medication management;finances;yardwork   IADL Comments Pt reports starting a new job on Tuesday. Drives at baseline.   Activities of Daily Living Analysis   Impairments Contributing to Impaired Activities of Daily Living pain   General Therapy Interventions   Planned Therapy Interventions ADL retraining;IADL retraining;cognition;transfer training;risk factor education;progressive activity/exercise;home program guidelines   Clinical Impression   Criteria for Skilled Therapeutic Interventions Met yes, treatment indicated   OT Diagnosis OT order for stroke   Influenced by the following impairments pain   Assessment of Occupational Performance 1-3 Performance Deficits   Identified Performance Deficits home mgmt  "  Clinical Decision Making (Complexity) Low complexity   Therapy Frequency other (see comments)   Predicted Duration of Therapy Intervention (days/wks) (1x eval + treatment)   Anticipated Discharge Disposition Home with Assist   Risks and Benefits of Treatment have been explained. Yes   Patient, Family & other staff in agreement with plan of care Yes   [facility] TM \"6 Clicks\"   2016, Trustees of [facility], under license to [employer].  All rights reserved.   6 Clicks Short Forms Daily Activity Inpatient Short Form   [facility]  \"6 Clicks\" Daily Activity Inpatient Short Form   1. Putting on and taking off regular lower body clothing? 4 - None   2. Bathing (including washing, rinsing, drying)? 4 - None   3. Toileting, which includes using toilet, bedpan or urinal? 4 - None   4. Putting on and taking off regular upper body clothing? 4 - None   5. Taking care of personal grooming such as brushing teeth? 4 - None   6. Eating meals? 4 - None   Daily Activity Raw Score (Score out of 24.Lower scores equate to lower levels of function) 24   Total Evaluation Time   Total Evaluation Time (Minutes) 7     "

## 2018-08-02 NOTE — PROGRESS NOTES
VSS ex htn w/in parameters. Neuros unchanged. PIV removed. Pt discharged home. Discharge education performed, all questions answered. Pt discharged home with wife. Walked off unit independently. Continue follow plan of care.

## 2018-08-02 NOTE — PLAN OF CARE
Problem: Patient Care Overview  Goal: Plan of Care/Patient Progress Review  OT/6A - Discharge Planner OT   Patient plan for discharge: home today  Current status: Pt reports LUE sensation has returned to normal. Strength symmetrical in UE. Facilitated functional mobility ~800 feet with SBA, no LOB noted. Mild instability noted, pt reports this is baseline for him. Pt dons B socks IND while seated EOB. Ascends/descends 12 stairs with unilateral handrail and SBA. /93 (137), hypertensive at baseline per pt report.   Barriers to return to prior living situation: baseline vision deficits (blind in L eye), activity tolerance  Recommendations for discharge: home with assist PRN  Rationale for recommendations: Pt reports no remaining deficits from stroke and expresses no concerns with discharge to home. Pt reports wife able to assist PRN.        Entered by: Kizzy Ramirez 08/02/2018 10:24 AM         Occupational Therapy Discharge Summary    Reason for therapy discharge:    All goals and outcomes met, no further needs identified.    Progress towards therapy goal(s). See goals on Care Plan in [facility] electronic health record for goal details.  Goals met    Therapy recommendation(s):    No further therapy is recommended.

## 2018-08-03 ENCOUNTER — CARE COORDINATION (OUTPATIENT)
Dept: CARE COORDINATION | Facility: CLINIC | Age: 68
End: 2018-08-03

## 2018-08-03 DIAGNOSIS — I10 BENIGN ESSENTIAL HYPERTENSION: Primary | ICD-10-CM

## 2018-08-03 DIAGNOSIS — I48.21 PERMANENT ATRIAL FIBRILLATION (H): ICD-10-CM

## 2018-08-03 DIAGNOSIS — I10 ESSENTIAL HYPERTENSION: ICD-10-CM

## 2018-08-03 LAB — INTERPRETATION ECG - MUSE: NORMAL

## 2018-08-03 RX ORDER — LISINOPRIL 20 MG/1
20 TABLET ORAL EVERY MORNING
Qty: 30 TABLET | Refills: 11 | Status: SHIPPED | OUTPATIENT
Start: 2018-08-03 | End: 2019-07-03

## 2018-08-03 RX ORDER — AMLODIPINE BESYLATE 5 MG/1
5 TABLET ORAL DAILY
Qty: 30 TABLET | Refills: 11 | Status: SHIPPED | OUTPATIENT
Start: 2018-08-03 | End: 2019-07-03

## 2018-08-03 NOTE — PROGRESS NOTES
Clinic is taking over patient chart and calls- to prevent duplicate calling, no further DC calls will be made.

## 2018-08-03 NOTE — TELEPHONE ENCOUNTER
"Meds were \"local print\" not e-prescribe, and therefore the prescriptions were not received in [place].      The apixaban and lisonpril will be sent.      Amlodipine 5 mg, per the discharge summary is to be started, however, was not prescribed.  This will be ordered and sent.    "

## 2018-08-03 NOTE — PROGRESS NOTES
Amlodipine 5 mg, per the discharge summary is to be started, however, was not prescribed.  This will be ordered and sent.

## 2018-08-10 ENCOUNTER — CARE COORDINATION (OUTPATIENT)
Dept: CARDIOLOGY | Facility: CLINIC | Age: 68
End: 2018-08-10

## 2018-08-10 DIAGNOSIS — Z86.73 S/P STROKE DUE TO CEREBROVASCULAR DISEASE: Primary | ICD-10-CM

## 2018-08-10 NOTE — PROGRESS NOTES
Date: 8/10/2018    Time of Call: 12:12 PM     Diagnosis:  S/p stroke, severe aortic stenosis     [ TORB ] Ordering provider: Michael Diaz MD  Order:   Neurology referral     Order received by: Nelli Mccallum RN     Follow-up/additional notes:   The following is email communication with the patient:    Javier Scanlon:      Below is the telephone number and website for the [facility].   Please make an appointment with neurology.  Specifically, we are looking for:  follow up care/review after his stroke, and, their recommendations on when to safely proceed with the Watchman device placement, and the transcatheter aortic valve replacement (TAVR) both procedures that require heparinization during the procedures.  I put this information in the referral, but it never hurts to re-review this with the MD at the time of the appointment.        [place] [phone]   [email or web address]    Can you let me know that you received this email?    Many thanks and take care,  Eloise

## 2018-08-13 ENCOUNTER — TELEPHONE (OUTPATIENT)
Dept: CARDIOLOGY | Facility: CLINIC | Age: 68
End: 2018-08-13

## 2018-08-13 NOTE — TELEPHONE ENCOUNTER
Return telephone call made to Ghislaine.  She states that she was requesting a sooner neurology appointment than November in [place], not the Watchman or TAVR procedure.    A sooner than later appointment with neurology in the [facility] will be attempted to be made for the patient and will follow back up with Ghislaine.

## 2018-08-13 NOTE — TELEPHONE ENCOUNTER
[facility] Call Center    Phone Message    May a detailed message be left on voicemail: yes    Reason for Call: Other: Pts wife Ghislaine calling. Pt was referred to [place] for Watchman placement and TAVR procedure. The soonest they can get him in was November and she doesn't feel pt can wait that long.  Please call back with options.     Action Taken: Message routed to:  [facility] ([facility]

## 2018-08-16 ENCOUNTER — CARE COORDINATION (OUTPATIENT)
Dept: CARDIOLOGY | Facility: CLINIC | Age: 68
End: 2018-08-16

## 2018-08-16 NOTE — PROGRESS NOTES
Ghislaine called and a voice mail left:  Neuro appt is scheduled for 9/11/2018 at the clinic[facility] surgery [facility].  Telephone number to reschedule the appointment was shared with her.  Will continue to follow up with Ghislaine, and the patient after the appointment is completed.

## 2018-08-28 ASSESSMENT — ENCOUNTER SYMPTOMS
MUSCLE WEAKNESS: 1
MUSCLE CRAMPS: 1
WHEEZING: 0
BLOATING: 0
ORTHOPNEA: 0
JOINT SWELLING: 0
HYPOTENSION: 0
DIARRHEA: 0
BLOOD IN STOOL: 0
POSTURAL DYSPNEA: 0
CONSTIPATION: 0
HEARTBURN: 0
SNORES LOUDLY: 0
DYSPNEA ON EXERTION: 1
VOMITING: 0
HEMOPTYSIS: 0
COUGH DISTURBING SLEEP: 0
SLEEP DISTURBANCES DUE TO BREATHING: 0
JAUNDICE: 0
LIGHT-HEADEDNESS: 1
ABDOMINAL PAIN: 0
SHORTNESS OF BREATH: 1
MYALGIAS: 1
LEG PAIN: 1
EXERCISE INTOLERANCE: 1
COUGH: 1
SYNCOPE: 0
SPUTUM PRODUCTION: 1
BOWEL INCONTINENCE: 0
BACK PAIN: 1
HYPERTENSION: 0
STIFFNESS: 1
NECK PAIN: 1
PALPITATIONS: 1
NAUSEA: 1
RECTAL PAIN: 0
ARTHRALGIAS: 1

## 2018-09-04 LAB
DLCOUNC-%PRED-PRE: 65 %
DLCOUNC-PRE: 17.14 ML/MIN/MMHG
DLCOUNC-PRED: 26.29 ML/MIN/MMHG
ERV-%PRED-PRE: 165 %
ERV-PRE: 2.17 L
ERV-PRED: 1.31 L
EXPTIME-PRE: 7.03 SEC
FEF2575-%PRED-PRE: 68 %
FEF2575-PRE: 1.7 L/SEC
FEF2575-PRED: 2.48 L/SEC
FEFMAX-%PRED-PRE: 73 %
FEFMAX-PRE: 6.23 L/SEC
FEFMAX-PRED: 8.43 L/SEC
FEV1-%PRED-PRE: 81 %
FEV1-PRE: 2.63 L
FEV1FEV6-PRE: 70 %
FEV1FEV6-PRED: 78 %
FEV1FVC-PRE: 69 %
FEV1FVC-PRED: 74 %
FEV1SVC-PRE: 65 %
FEV1SVC-PRED: 68 %
FIFMAX-PRE: 5.94 L/SEC
FRCPLETH-%PRED-PRE: 140 %
FRCPLETH-PRE: 5.18 L
FRCPLETH-PRED: 3.69 L
FVC-%PRED-PRE: 88 %
FVC-PRE: 3.79 L
FVC-PRED: 4.28 L
IC-%PRED-PRE: 53 %
IC-PRE: 1.86 L
IC-PRED: 3.47 L
RVPLETH-%PRED-PRE: 115 %
RVPLETH-PRE: 3.02 L
RVPLETH-PRED: 2.62 L
TLCPLETH-%PRED-PRE: 98 %
TLCPLETH-PRE: 7.05 L
TLCPLETH-PRED: 7.13 L
VA-%PRED-PRE: 79 %
VA-PRE: 5.44 L
VC-%PRED-PRE: 84 %
VC-PRE: 4.03 L
VC-PRED: 4.78 L

## 2018-09-07 NOTE — TELEPHONE ENCOUNTER
FUTURE VISIT INFORMATION      FUTURE VISIT INFORMATION:    Date: 09/11/2018    Time: 9;00 am     [place] [facility]   [place]ERR[place] INFORMATION:    Referring provider:  VALENTINA AYOUB          NOTES (FOR ALL VISITS) STATUS DETAILS   OFFICE NOTE from referring provider Internal 08/10/2018   OFFICE NOTE from other specialist N/A    DISCHARGE SUMMARY from hospital Care Everywhere  Internal  08/01/2018, 07/30/2018, 7/05/2018, 01/15/2018, 12/27/2018, 11/08/2017, 10/29/2017, 10/28/2017, 02/13/2016   DISCHARGE REPORT from the ER Care Everywhere 08/01/2018, 07/05/2018, 04/08/2018, 03/15/2018, 02/25/2018, 01/22/2018, 01/18/2018, 01/15/2018, 12/27/2017, 12/26/2017, 10/28/2017, 10/21/2017, 05/01/2017, 10/14/2016, 02/13/2016, 11/17/2015,    OPERATIVE REPORT Care Everywhere ,04/25/2018, 06/06/2017, 05/01/2017, 02/07/2017, 01/16/2017, 07/25/2016, 02/14/2016, 01/21/2015,    MEDICATION LIST Internal     IMAGING  (FOR ALL VISITS)     EMG Internal 08/16/2018, 08/10/2018, 08/01/2018   ,EEG N/A    ECT N/A    MRI (HEAD, NECK, SPINE) Care Everywhere  PACS  08/01/2018, 12/27/2017, 08/14/2014, 05/15/2015   CT (HEAD, NECK, SPINE) Care Everywhere   PACS 08/01/2018,07/30/2018 11/09/2017, 10/28/2017, 06/28/2014   OTHER     US Carotid Bilateral PACS  07/19/2018

## 2018-09-11 ENCOUNTER — OFFICE VISIT (OUTPATIENT)
Dept: NEUROLOGY | Facility: CLINIC | Age: 68
End: 2018-09-11
Payer: COMMERCIAL

## 2018-09-11 ENCOUNTER — PRE VISIT (OUTPATIENT)
Dept: NEUROLOGY | Facility: CLINIC | Age: 68
End: 2018-09-11

## 2018-09-11 ENCOUNTER — TELEPHONE (OUTPATIENT)
Dept: NEUROLOGY | Facility: CLINIC | Age: 68
End: 2018-09-11

## 2018-09-11 VITALS
HEART RATE: 93 BPM | SYSTOLIC BLOOD PRESSURE: 153 MMHG | TEMPERATURE: 98.3 F | DIASTOLIC BLOOD PRESSURE: 92 MMHG | BODY MASS INDEX: 24 KG/M2 | HEIGHT: 71 IN | WEIGHT: 171.4 LBS | OXYGEN SATURATION: 97 %

## 2018-09-11 DIAGNOSIS — Z86.73 S/P STROKE DUE TO CEREBROVASCULAR DISEASE: ICD-10-CM

## 2018-09-11 PROBLEM — K27.9 PEPTIC ULCER DISEASE: Status: ACTIVE | Noted: 2017-10-28

## 2018-09-11 PROBLEM — G89.29 CHRONIC BILATERAL LOW BACK PAIN WITHOUT SCIATICA: Status: ACTIVE | Noted: 2017-09-01

## 2018-09-11 PROBLEM — Z86.69 HISTORY OF MIGRAINE: Status: ACTIVE | Noted: 2018-01-03

## 2018-09-11 PROBLEM — R79.82 CRP ELEVATED: Status: ACTIVE | Noted: 2017-12-27

## 2018-09-11 PROBLEM — F11.10 MILD OPIOID USE DISORDER (H): Status: ACTIVE | Noted: 2018-04-09

## 2018-09-11 PROBLEM — Z71.89 ACP (ADVANCE CARE PLANNING): Status: ACTIVE | Noted: 2017-12-27

## 2018-09-11 PROBLEM — D50.0 IRON DEFICIENCY ANEMIA DUE TO CHRONIC BLOOD LOSS: Status: ACTIVE | Noted: 2017-10-28

## 2018-09-11 PROBLEM — Z98.84 STATUS POST BARIATRIC SURGERY: Status: ACTIVE | Noted: 2018-07-30

## 2018-09-11 PROBLEM — Z98.84 HISTORY OF ROUX-EN-Y GASTRIC BYPASS: Status: ACTIVE | Noted: 2018-06-15

## 2018-09-11 PROBLEM — I35.0 MODERATE AORTIC STENOSIS: Status: ACTIVE | Noted: 2017-12-18

## 2018-09-11 PROBLEM — K91.89 ANASTOMOTIC STRICTURE OF GASTROJEJUNOSTOMY: Status: ACTIVE | Noted: 2018-07-05

## 2018-09-11 PROBLEM — M54.50 CHRONIC BILATERAL LOW BACK PAIN WITHOUT SCIATICA: Status: ACTIVE | Noted: 2017-09-01

## 2018-09-11 PROBLEM — E78.5 DYSLIPIDEMIA: Status: ACTIVE | Noted: 2018-07-05

## 2018-09-11 PROBLEM — I63.81 LACUNAR INFARCTION (H): Status: ACTIVE | Noted: 2017-12-27

## 2018-09-11 PROBLEM — E87.6 HYPOKALEMIA: Status: ACTIVE | Noted: 2018-07-05

## 2018-09-11 PROBLEM — Z72.0 TOBACCO ABUSE: Status: ACTIVE | Noted: 2018-01-31

## 2018-09-11 PROBLEM — Z79.899 CONTROLLED SUBSTANCE AGREEMENT SIGNED: Status: ACTIVE | Noted: 2017-07-28

## 2018-09-11 PROBLEM — E44.0 MODERATE PROTEIN-CALORIE MALNUTRITION (H): Status: ACTIVE | Noted: 2018-07-05

## 2018-09-11 PROBLEM — G89.29 CHRONIC PAIN: Status: ACTIVE | Noted: 2018-07-05

## 2018-09-11 PROBLEM — G47.26 SHIFT WORK SLEEP DISORDER: Status: ACTIVE | Noted: 2017-10-28

## 2018-09-11 PROBLEM — R11.15 INTRACTABLE CYCLICAL VOMITING WITH NAUSEA: Status: ACTIVE | Noted: 2018-04-25

## 2018-09-11 LAB
CRP SERPL-MCNC: 12 MG/L (ref 0–8)
ERYTHROCYTE [SEDIMENTATION RATE] IN BLOOD BY WESTERGREN METHOD: 14 MM/H (ref 0–20)

## 2018-09-11 ASSESSMENT — PAIN SCALES - GENERAL: PAINLEVEL: EXTREME PAIN (8)

## 2018-09-11 NOTE — LETTER
2018       RE: Migel Stringer  [address]  [facility] [address]     Dear Colleague,    Thank you for referring your patient, Migel Stringer, to the [facility] NEUROLOGY at [facility]. Please see a copy of my visit note below.    Service Date: 2018      RE: Migel Stringer   MRN: [id number]   : 1950      Dear Dr. Dorado:       Mr. Migel Stringer is a 68-year-old  accompanied by his wife who was evaluated in the General Neurology Clinic, as he had an episode of left arm incoordination and weakness, which brought him to the emergency department on .      This man is awaiting surgery for severe aortic stenosis and is going to have a transfemoral change in the valve.  He has chronic atrial fibrillation.  He apparently has, also, coronary artery disease.      Since the last episode, he has been taking Plavix, which is not listed on the record, but the wife ascertains that she is sure that he has had it.  He had an episode approximately a day after having a dobutamine stress test, which did shoot his blood pressures significantly up, and he had a whopping headache.  In this episode, he developed weakness, which lasted about a day, and incoordination of the left arm with some numbness as well.  It did not affect his strength or .  He was seen in the emergency room on  with this episode, and this was reviewed.  He had at that time studies done, which included MRI/MRA of the brain, which I have not been able to review, but this did show apparently 2 small strokes, one in the left cerebellum and one in the left occipital lobe.  He denies any visual symptoms.  He did have a CT angiogram done but it was the coronary.  A head CT was done on the .  As in the dobutamine stress test, he complained of severe headaches, and this at that time did not show any ischemic event like the MRI the next day showed.  There was an MRI of the  "head, as I said, which included an MRI of the neck and 2 strokes, one in the left occipital lobe and one in the left cerebellum.  The sequence or the reading says, \"Small acute infarcts in the left cerebellum and left occipital lobe with a hemorrhage or mass effect and chronic cortical infarct in the right cerebellum unchanged from 12/27.  Mild small ischemic disease.\"  MRA was unchanged from the one of 12/27/2007.  MRA of the neck did not show any high-grade stenosis.  He did have an ultrasound, which says they are less than 50% stenosed.           These outside films cannot be retrieved for review.      His left arm weakness cleared up within a day or so, and he has been back to baseline and been on the Plavix.  He has never had this before.  He does have a history of headaches over the last few years.  These are temporal and do extend to the neck area, where they can be intense.  He has been treated with Botox for about 7 years, and he says this is helping considerably.         He recalls clearly that at the time of the dobutamine stress test, he did not have any left arm weakness.      PAST MEDICAL HISTORY:  Diagnoses of gastric bypass, bilateral lower back pain without sciatica and multiple surgeries in his back.  He has a controlled substance agreement, chronic atrial fib, stricture of the gastrojejunostomy, tobacco dependency, chronic pain disorder, anemia due to blood loss, peptic ulcer disease, post laminectomy syndrome.  He has had a total of 6 lumbar surgeries, 1 thoracic surgery in the spine.      CURRENT MEDICATIONS:  Atorvastatin, lisinopril, Excedrin, albuterol, pregabalin, but they do not list Plavix, although he is sure he is taking it.      FAMILY HISTORY:  Relatively unremarkable.      REVIEW OF SYSTEMS:  Did not show anything further.        PHYSICAL EXAMINATION:  A very pleasant man.  His blood pressure is 153/92.  Pulse is 93.  Mental status exam is normal.  Some tenderness of the right " "temporal artery.  Fundi show no cholesterol.  There is no bruit of the carotid bifurcation.  There is an aortic stenosis murmur at the top of the sternum, which does not radiate.      His eye movements are normal.  Face is fairly symmetrical.  Arm movements are good and strong.  Coordination is pretty good, although he is left-handed, and it seems his left hand is a little bit less coordinated, but this is very questionable.  His reflexes are equal and symmetrical.  He has some problem with his right ankle and has had issues and surgery with that.  He does not feel the vibratory sensation there, but has good vibration in the left foot.  His reflexes are not unequal.  There is no Babinski.  His gait is a little bit abnormal due to the right leg problems.      In summary, this patient did have a TIA about 24 hours after a dobutamine stress test, and his MRI did show 2 lesions, one in the left cerebellum lateral and another one in the left occipital lobe.  These have very much cleared.  He has no neurological deficit at this time.  He does have a possibility that these emboli are very likely due to his aortic stenosis and also to his atrial fibrillation.  He is currently on Plavix, and that should be enough for anticoagulation, as especially surgery has been contemplated.        His imaging studies of blood vessels do not show any major stenoses of the carotids.  An MRA has been done, and this has been possibly covered in the MRA, although I do not have the actual films, but it does show his MRI of the neck, and it does not specifically mention the vertebrals, but this is unchanged from the 12/27 MRA.  They do mention that the \"visualized portion of the aortic arch, great vessel origins and proximal subclavian arteries are unremarkable.\"        We discussed the issue, and he did have a TIA, which has cleared, and it could be cardiac in source.  I agree the surgery is necessary in terms of correcting what has caused " already 2 TIAs.        He has headache and tenderness in the right temporal artery, so we will rule out temporal arteritis with a sedimentation rate and a CRP.  We will be glad to see him in the future.    He is unable to work at this time.    D: 2018   T: 2018   MT: haim      Name:     ZIA DIALLO   MRN:      [id number]        Account:      [id number]   :      1950           Service Date: 2018      Document: [id number]        Again, thank you for allowing me to participate in the care of your patient.      Sincerely,    Diogo Myrick MD    cc:   Wale Smyth MD   [facility] [address] [address]     Joel Dorado MD   [address] [address]

## 2018-09-11 NOTE — MR AVS SNAPSHOT
"              After Visit Summary   9/11/2018    Migel Stringer    MRN: [id number]           Patient Information     Date Of Birth          1950        Visit Information        Provider Department      9/11/2018 9:00 AM Diogo Myrick MD [facility] Neurology        Today's Diagnoses     S/P stroke due to cerebrovascular disease           Follow-ups after your visit        Follow-up notes from your care team     Return if symptoms worsen or fail to improve.      Who to contact     Please call your clinic at [phone] to:    Ask questions about your health    Make or cancel appointments    Discuss your medicines    Learn about your test results    Speak to your doctor            Additional Information About Your Visit        [place] [employer] Information     [email or web address] gives you secure access to your electronic health record. If you see a primary care provider, you can also send messages to your care team and make appointments. If you have questions, please call your primary care clinic.  If you do not have a primary care provider, please call [phone] and they will assist you.      [email or web address] is an electronic gateway that provides easy, online access to your medical records. With [email or web address], you can request a clinic appointment, read your test results, renew a prescription or communicate with your care team.     To access your existing account, please contact your [facility] or call [phone] for assistance.        Care EveryWhere ID     This is your Care EveryWhere ID. This could be used by other organizations to access your [place] medical records  [id number]        Your Vitals Were     Pulse Temperature Height Pulse Oximetry BMI (Body Mass Index)       93 98.3  F (36.8  C) (Oral) 1.791 m (5' 10.5\") 97% 24.25 kg/m2        Blood Pressure from Last 3 Encounters:   No data found for BP    Weight from Last 3 Encounters:   No data found for Wt              Today, you had the following  "    No orders found for display       Primary Care Provider Office Phone # Fax #    Joel Dorado [phone]       [facility] [place] [address]  [place] [address]        Equal Access to Services     MEME MCDANIEL : Hadii aad ku hadsawyero Soomaali, waaxda luqadaha, qaybta kaalmada adeegyada, susan hannonn cristy baughcolin yusuf. So [facility] [phone].    ATENCIÓN: Si habla español, tiene a alvarez disposición servicios gratuitos de asistencia lingüística. Llame al [phone].    We comply with applicable federal civil rights laws and [place] laws. We do not discriminate on the basis of race, color, national origin, age, disability, sex, sexual orientation, or gender identity.            Thank you!     Thank you for choosing [facility] NEUROLOGY  for your care. Our goal is always to provide you with excellent care. Hearing back from our patients is one way we can continue to improve our services. Please take a few minutes to complete the written survey that you may receive in the mail after your visit with us. Thank you!             Your Updated Medication List - Protect others around you: Learn how to safely use, store and throw away your medicines at www.disposemymeds.org.          This list is accurate as of 9/11/18 11:59 PM.  Always use your most recent med list.                   Brand Name Dispense Instructions for use Diagnosis    * albuterol 108 (90 Base) MCG/ACT inhaler    PROAIR HFA/PROVENTIL HFA/VENTOLIN HFA     Inhale 1-2 puffs into the lungs 2 times daily        * albuterol (2.5 MG/3ML) 0.083% neb solution      Inhale 2.5 mg into the lungs 2 times daily        amLODIPine 5 MG tablet    NORVASC    30 tablet    Take 1 tablet (5 mg) by mouth daily    Benign essential hypertension       apixaban ANTICOAGULANT 5 MG tablet    ELIQUIS    60 tablet    Take 1 tablet (5 mg) by mouth 2 times daily    Permanent atrial fibrillation (H)       aspirin-acetaminophen-caffeine 250-250-65 MG per tablet     EXCEDRIN MIGRAINE     Take 1 tablet by mouth as needed for headaches        atorvastatin 20 MG tablet    LIPITOR     Take 20 mg by mouth every morning        diphenhydrAMINE-acetaminophen  MG tablet    TYLENOL PM     Take 6 tablets by mouth At Bedtime        fentaNYL 50 mcg/hr 72 hr patch    DURAGESIC     Place 1 patch onto the skin every 72 hours        ipratropium - albuterol 0.5 mg/2.5 mg/3 mL 0.5-2.5 (3) MG/3ML neb solution    DUONEB     Inhale 3 mLs into the lungs 3 times daily        lisinopril 20 MG tablet    PRINIVIL/ZESTRIL    30 tablet    Take 1 tablet (20 mg) by mouth every morning    Essential hypertension       omeprazole 40 MG capsule    priLOSEC    60 capsule    Take 1 capsule (40 mg) by mouth 2 times daily Take 30-60 minutes before a meal.    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, with obstruction(534.81)       oxyCODONE-acetaminophen  MG per tablet    PERCOCET     Take 1 tablet by mouth every 4 hours        pregabalin 100 MG capsule    LYRICA     Take 100 mg by mouth 4 times daily        prochlorperazine 5 MG tablet    COMPAZINE     Take 5 mg by mouth every 6 hours as needed        sucralfate 1 GM tablet    CARAFATE     Take 1 g by mouth 4 times daily        SUMAtriptan 100 MG tablet    IMITREX     Take 100 mg by mouth as needed        tiZANidine 4 MG tablet    ZANAFLEX     Take 4 mg by mouth as needed        * Notice:  This list has 2 medication(s) that are the same as other medications prescribed for you. Read the directions carefully, and ask your doctor or other care provider to review them with you.

## 2018-09-11 NOTE — PROGRESS NOTES
Answers for HPI/ROS submitted by the patient on 8/28/2018   General Symptoms: No  Skin Symptoms: No  HENT Symptoms: No  EYE SYMPTOMS: No  HEART SYMPTOMS: Yes  LUNG SYMPTOMS: Yes  INTESTINAL SYMPTOMS: Yes  URINARY SYMPTOMS: No  REPRODUCTIVE SYMPTOMS: No  SKELETAL SYMPTOMS: Yes  BLOOD SYMPTOMS: No  NERVOUS SYSTEM SYMPTOMS: No  MENTAL HEALTH SYMPTOMS: No  Cough: Yes  Sputum or phlegm: Yes  Coughing up blood: No  Difficulty breating or shortness of breath: Yes  Snoring: No  Wheezing: No  Difficulty breathing on exertion: Yes  Nighttime Cough: No  Difficulty breathing when lying flat: No  Chest pain or pressure: No  Fast or irregular heartbeat: Yes  Pain in legs with walking: Yes  Trouble breathing while lying down: No  Fingers or toes appear blue: No  High blood pressure: No  Low blood pressure: No  Fainting: No  Murmurs: Yes  Pacemaker: No  Varicose veins: No  Edema or swelling: No  Wake up at night with shortness of breath: No  Light-headedness: Yes  Exercise intolerance: Yes  Heart burn or indigestion: No  Nausea: Yes  Vomiting: No  Abdominal pain: No  Bloating: No  Constipation: No  Diarrhea: No  Blood in stool: No  Black stools: No  Rectal or Anal pain: No  Fecal incontinence: No  Yellowing of skin or eyes: No  Vomit with blood: No  Change in stools: No  Back pain: Yes  Muscle aches: Yes  Neck pain: Yes  Swollen joints: No  Joint pain: Yes  Bone pain: No  Muscle cramps: Yes  Muscle weakness: Yes  Joint stiffness: Yes  Bone fracture: No

## 2018-09-11 NOTE — TELEPHONE ENCOUNTER
Dr. Myrick is needing images of patient's MRI; Called [facility] and left message to request image disk be sent.  Received return call indicating that a disk with all images was mailed out yesterday ( 9/10/18 ).

## 2018-09-14 ENCOUNTER — CARE COORDINATION (OUTPATIENT)
Dept: NEUROLOGY | Facility: CLINIC | Age: 68
End: 2018-09-14

## 2018-09-14 NOTE — PROGRESS NOTES
Faxed requested office visit via [employer], that patient had with Dr. Myrick on 9/11/2018 today at 7484 on 9/14/2018.    Also sent in mail for patient a copy of his visit with Dr Jamil from 9/11/2018 .

## 2018-09-14 NOTE — PROGRESS NOTES
"Service Date: 2018      Joel Dorado MD   [address] [address]      RE: Migel Stringer   MRN: [id number]   : 1950      Dear Dr. Dorado:       Mr. Migel Stringer is a 68-year-old  accompanied by his wife who was evaluated in the [facility], as he had an episode of left arm incoordination and weakness, which brought him to the emergency department on .      This man is awaiting surgery for severe aortic stenosis and is going to have a transfemoral change in the valve.  He has chronic atrial fibrillation.  He apparently has, also, coronary artery disease.      Since the last episode, he has been taking Plavix, which is not listed on the record, but the wife ascertains that she is sure that he has had it.  He had an episode approximately a day after having a dobutamine stress test, which did shoot his blood pressures significantly up, and he had a whopping headache.  In this episode, he developed weakness, which lasted about a day, and incoordination of the left arm with some numbness as well.  It did not affect his strength or .  He was seen in the emergency room on  with this episode, and this was reviewed.  He had at that time studies done, which included MRI/MRA of the brain, which I have not been able to review, but this did show apparently 2 small strokes, one in the left cerebellum and one in the left occipital lobe.  He denies any visual symptoms.  He did have a CT angiogram done but it was the coronary.  A head CT was done on the .  As in the dobutamine stress test, he complained of severe headaches, and this at that time did not show any ischemic event like the MRI the next day showed.  There was an MRI of the head, as I said, which included an MRI of the neck and 2 strokes, one in the left occipital lobe and one in the left cerebellum.  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  Mild small ischemic disease.\"  MRA was unchanged from the one of 12/27/2007.  MRA of the neck did not show any high-grade stenosis.  He did have an ultrasound, which says they are less than 50% stenosed.           These outside films cannot be retrieved for review.      His left arm weakness cleared up within a day or so, and he has been back to baseline and been on the Plavix.  He has never had this before.  He does have a history of headaches over the last few years.  These are temporal and do extend to the neck area, where they can be intense.  He has been treated with Botox for about 7 years, and he says this is helping considerably.         He recalls clearly that at the time of the dobutamine stress test, he did not have any left arm weakness.      PAST MEDICAL HISTORY:  Diagnoses of gastric bypass, bilateral lower back pain without sciatica and multiple surgeries in his back.  He has a controlled substance agreement, chronic atrial fib, stricture of the gastrojejunostomy, tobacco dependency, chronic pain disorder, anemia due to blood loss, peptic ulcer disease, post laminectomy syndrome.  He has had a total of 6 lumbar surgeries, 1 thoracic surgery in the spine.      CURRENT MEDICATIONS:  Atorvastatin, lisinopril, Excedrin, albuterol, pregabalin, but they do not list Plavix, although he is sure he is taking it.      FAMILY HISTORY:  Relatively unremarkable.      REVIEW OF SYSTEMS:  Did not show anything further.        PHYSICAL EXAMINATION:  A very pleasant man.  His blood pressure is 153/92.  Pulse is 93.  Mental status exam is normal.  Some tenderness of the right temporal artery.  Fundi show no cholesterol.  There is no bruit of the carotid bifurcation.  There is an aortic stenosis murmur at the top of the sternum, which does not radiate.      His eye movements are " "normal.  Face is fairly symmetrical.  Arm movements are good and strong.  Coordination is pretty good, although he is left-handed, and it seems his left hand is a little bit less coordinated, but this is very questionable.  His reflexes are equal and symmetrical.  He has some problem with his right ankle and has had issues and surgery with that.  He does not feel the vibratory sensation there, but has good vibration in the left foot.  His reflexes are not unequal.  There is no Babinski.  His gait is a little bit abnormal due to the right leg problems.      In summary, this patient did have a TIA about 24 hours after a dobutamine stress test, and his MRI did show 2 lesions, one in the left cerebellum lateral and another one in the left occipital lobe.  These have very much cleared.  He has no neurological deficit at this time.  He does have a possibility that these emboli are very likely due to his aortic stenosis and also to his atrial fibrillation.  He is currently on Plavix, and that should be enough for anticoagulation, as especially surgery has been contemplated.        His imaging studies of blood vessels do not show any major stenoses of the carotids.  An MRA has been done, and this has been possibly covered in the MRA, although I do not have the actual films, but it does show his MRI of the neck, and it does not specifically mention the vertebrals, but this is unchanged from the 12/27 MRA.  They do mention that the \"visualized portion of the aortic arch, great vessel origins and proximal subclavian arteries are unremarkable.\"        We discussed the issue, and he did have a TIA, which has cleared, and it could be cardiac in source.  I agree the surgery is necessary in terms of correcting what has caused already 2 TIAs.        He has headache and tenderness in the right temporal artery, so we will rule out temporal arteritis with a sedimentation rate and a CRP.  We will be glad to see him in the future.    He " is unable to work at this time.  Sincerely,      Diogo Myrick MD       cc:   Wale Smyth MD   [facility] [address]                   DIOGO MYRICK MD             D: 2018   T: 2018   MT: haim      Name:     ZIA DIALLO   MRN:      [id number]        Account:      [id number]   :      1950           Service Date: 2018      Document: [id number]

## 2018-09-17 ENCOUNTER — CARE COORDINATION (OUTPATIENT)
Dept: CARDIOLOGY | Facility: CLINIC | Age: 68
End: 2018-09-17

## 2018-09-17 NOTE — PROGRESS NOTES
Follow up telephone call made to Ghislaine on behalf of her , the pt.  A voicemail message was left.    Was inquiring on the neuro visit, and if there was any other follow up appointments needed.    In reading the neuro note, it states that the Ghislaine states that the patient is taking plavix.  Wanted to follow up on this, as it is not mentioned in the chart, and receive clarification on this point.     Will continue to follow up.

## 2018-10-03 ENCOUNTER — TELEPHONE (OUTPATIENT)
Dept: NEUROLOGY | Facility: CLINIC | Age: 68
End: 2018-10-03

## 2018-10-03 NOTE — TELEPHONE ENCOUNTER
Imaging Received  [address] [address]  [phone]   Image Type (x):   Disc:  X    8/1/2018  CT Head Brain WO  8/1/2018 MR Angio Head Brain WO  8/1/2018 Angio Neck WWO  8/1/2018 MR Head Brain wwo  8/1/2018 MR Head Brain wwo  8/1/2018 MR Angio Neck wwo    Plus 14 more studies

## 2018-10-23 ENCOUNTER — TELEPHONE (OUTPATIENT)
Dept: CARDIOLOGY | Facility: CLINIC | Age: 68
End: 2018-10-23

## 2018-10-23 NOTE — TELEPHONE ENCOUNTER
[facility] Call Center    Phone Message    May a detailed message be left on voicemail: yes    Reason for Call: Other: Per call from PT's wife is requesting for a call back to discuss plan for the PT after his neurology APPT.  Per  wife this is related to Stentz and other procedure. Okay to leave detailed message at the above #     Action Taken: Message routed to:  [facility] ([facility]): Cardiology

## 2018-11-06 ENCOUNTER — CARE COORDINATION (OUTPATIENT)
Dept: CARDIOLOGY | Facility: CLINIC | Age: 68
End: 2018-11-06

## 2018-11-06 NOTE — PROGRESS NOTES
Pt was called and a voice mail left:    He had concerns over the price of Eliquis and was wondering if Pradaxa or Xarelto may be less expensive.  All NOACs are expensive.  It was suggested that he check with his pharmacist to see which of the medications may be better given his present insurance situation.    Also, I requested him to return my phone call regarding proceeding with his pre-TAVR work up and if this is something he would like to continue to do.

## 2018-11-07 ENCOUNTER — CARE COORDINATION (OUTPATIENT)
Dept: CARDIOLOGY | Facility: CLINIC | Age: 68
End: 2018-11-07

## 2018-11-07 NOTE — PROGRESS NOTES
Ghislaine states that her  the patient, Randall, has been off eliquis for about a week due to the expense of the medication.  She states that the weekly INR check's associated with coumadin is not an option.  She states that Randall is still interested in pursuing the option of Watchman.     Will follow up with this request and continue to work towards a Watchman procedure.

## 2018-11-21 DIAGNOSIS — I48.91 A-FIB (H): Primary | ICD-10-CM

## 2018-11-21 RX ORDER — CEFAZOLIN SODIUM 2 G/100ML
2 INJECTION, SOLUTION INTRAVENOUS
Status: CANCELLED | OUTPATIENT
Start: 2018-11-21

## 2018-11-21 RX ORDER — POTASSIUM CHLORIDE 1500 MG/1
20 TABLET, EXTENDED RELEASE ORAL
Status: CANCELLED | OUTPATIENT
Start: 2018-11-21

## 2018-11-21 RX ORDER — LIDOCAINE 40 MG/G
CREAM TOPICAL
Status: CANCELLED | OUTPATIENT
Start: 2018-11-21

## 2018-11-21 RX ORDER — SODIUM CHLORIDE 9 MG/ML
1000 INJECTION, SOLUTION INTRAVENOUS CONTINUOUS
Status: CANCELLED | OUTPATIENT
Start: 2018-11-21

## 2018-11-28 ENCOUNTER — HOSPITAL ENCOUNTER (INPATIENT)
Facility: CLINIC | Age: 68
Setting detail: SURGERY ADMIT
End: 2018-11-28
Payer: MEDICARE

## 2018-12-01 DIAGNOSIS — I48.91 A-FIB (H): Primary | ICD-10-CM

## 2018-12-06 ENCOUNTER — CARE COORDINATION (OUTPATIENT)
Dept: CARDIOLOGY | Facility: CLINIC | Age: 68
End: 2018-12-06

## 2018-12-06 NOTE — PROGRESS NOTES
Spoke with Ghislaine about Migel up coming Watchman procedure. His was wanting to wait to have the procedure till January or February of 2019. I will call Ghislaine back once the dates are available for scheduling.

## 2019-01-17 ENCOUNTER — CARE COORDINATION (OUTPATIENT)
Dept: CARDIOLOGY | Facility: CLINIC | Age: 69
End: 2019-01-17

## 2019-01-17 NOTE — PROGRESS NOTES
Calling to let them know that February 5th is the procedure date for Migel to have his Watchman procedure. I was also calling to check in with Ghislaine to make sure that they have received the 30 day free trail packet for Eliquis. I stated that we would like to have Migel start the Eliquis 2 weeks prior to his procedure.    Contact information left for Ghislaine to call back.

## 2019-01-18 ENCOUNTER — CARE COORDINATION (OUTPATIENT)
Dept: CARDIOLOGY | Facility: CLINIC | Age: 69
End: 2019-01-18

## 2019-01-18 DIAGNOSIS — I48.91 A-FIB (H): Primary | ICD-10-CM

## 2019-01-18 DIAGNOSIS — D75.829 HEPARIN INDUCED THROMBOCYTOPENIA (H): Primary | ICD-10-CM

## 2019-01-18 NOTE — PROGRESS NOTES
Date: 1/18/2019    Time of Call: 2:38 PM     Diagnosis:  HIIT     [ TORB ] Ordering provider: Flako Pickens MD  Order:   RENNY Assay for HIIT     Order received by: Nelli Mccallum RN     Follow-up/additional notes:   Ordered to dx possible HIIT prior to upcoming Watchman procedure.

## 2019-01-18 NOTE — PROGRESS NOTES
Voicemail left to let Ghislaine know that I am planning on Randall having his Watchman om 2/5. I included in my voicemail that we need Randall to have labs prior to his procedure to look for the HIT antibody. Would like patient to have a PAC clinic visit prior as well.

## 2019-01-21 ENCOUNTER — CARE COORDINATION (OUTPATIENT)
Dept: CARDIOLOGY | Facility: CLINIC | Age: 69
End: 2019-01-21

## 2019-01-21 NOTE — PROGRESS NOTES
Ghislaine stated that the coupon they received for the free one month trial of Eliquis had .  She was requesting a new one.    This request for an updated card has gone out, and will follow up with Ghislaine when one becomes available.    Ghislaine was re-informed that Randall needs a PAC clinic visit with labs, for type and screen, as well as a HIIT panel to test for HIIT.    This appointment is being worked on to arrange with their schedule.

## 2019-01-22 ENCOUNTER — CARE COORDINATION (OUTPATIENT)
Dept: CARDIOLOGY | Facility: CLINIC | Age: 69
End: 2019-01-22

## 2019-01-22 NOTE — PROGRESS NOTES
Ghislaine had called prior stating that her coupon card for the eliquis had , requesting a new card.      I called Ghislaine, and left a message.  I explained the website that she can go to get a co-pay card for the eliquis.  Instructions for this was left.    I also encouraged her to make an effort to work with available dates for the pre-op H and P visit, with labs that was needed prior to the Watchman procedure.

## 2019-01-25 ENCOUNTER — ANESTHESIA EVENT (OUTPATIENT)
Dept: CARDIOLOGY | Facility: CLINIC | Age: 69
End: 2019-01-25

## 2019-01-25 ENCOUNTER — OFFICE VISIT (OUTPATIENT)
Dept: SURGERY | Facility: CLINIC | Age: 69
End: 2019-01-25
Payer: COMMERCIAL

## 2019-01-25 VITALS
SYSTOLIC BLOOD PRESSURE: 106 MMHG | TEMPERATURE: 98 F | OXYGEN SATURATION: 98 % | WEIGHT: 167 LBS | BODY MASS INDEX: 23.91 KG/M2 | HEART RATE: 92 BPM | HEIGHT: 70 IN | DIASTOLIC BLOOD PRESSURE: 67 MMHG

## 2019-01-25 DIAGNOSIS — I48.20 CHRONIC ATRIAL FIBRILLATION (H): Primary | ICD-10-CM

## 2019-01-25 DIAGNOSIS — D75.829 HEPARIN INDUCED THROMBOCYTOPENIA (H): ICD-10-CM

## 2019-01-25 DIAGNOSIS — Z01.818 PREOP EXAMINATION: ICD-10-CM

## 2019-01-25 DIAGNOSIS — Z01.818 PREOP EXAMINATION: Primary | ICD-10-CM

## 2019-01-25 DIAGNOSIS — I48.91 A-FIB (H): ICD-10-CM

## 2019-01-25 LAB
ABO + RH BLD: NORMAL
ABO + RH BLD: NORMAL
ANION GAP SERPL CALCULATED.3IONS-SCNC: 5 MMOL/L (ref 3–14)
BLD GP AB SCN SERPL QL: NORMAL
BLOOD BANK CMNT PATIENT-IMP: NORMAL
BUN SERPL-MCNC: 10 MG/DL (ref 7–30)
CALCIUM SERPL-MCNC: 8.2 MG/DL (ref 8.5–10.1)
CHLORIDE SERPL-SCNC: 110 MMOL/L (ref 94–109)
CO2 SERPL-SCNC: 25 MMOL/L (ref 20–32)
CREAT SERPL-MCNC: 0.74 MG/DL (ref 0.66–1.25)
ERYTHROCYTE [DISTWIDTH] IN BLOOD BY AUTOMATED COUNT: 18.7 % (ref 10–15)
GFR SERPL CREATININE-BSD FRML MDRD: >90 ML/MIN/{1.73_M2}
GLUCOSE SERPL-MCNC: 85 MG/DL (ref 70–99)
HCT VFR BLD AUTO: 42.6 % (ref 40–53)
HGB BLD-MCNC: 13.3 G/DL (ref 13.3–17.7)
INR PPP: 0.97 (ref 0.86–1.14)
MCH RBC QN AUTO: 27.7 PG (ref 26.5–33)
MCHC RBC AUTO-ENTMCNC: 31.2 G/DL (ref 31.5–36.5)
MCV RBC AUTO: 89 FL (ref 78–100)
PF4 HEPARIN CMPLX AB SER QL: NEGATIVE
PLATELET # BLD AUTO: 191 10E9/L (ref 150–450)
POTASSIUM SERPL-SCNC: 4.2 MMOL/L (ref 3.4–5.3)
RBC # BLD AUTO: 4.81 10E12/L (ref 4.4–5.9)
SODIUM SERPL-SCNC: 140 MMOL/L (ref 133–144)
SPECIMEN EXP DATE BLD: NORMAL
WBC # BLD AUTO: 6.7 10E9/L (ref 4–11)

## 2019-01-25 RX ORDER — ASPIRIN 81 MG/1
81 TABLET ORAL DAILY
COMMUNITY
End: 2019-09-26

## 2019-01-25 ASSESSMENT — MIFFLIN-ST. JEOR: SCORE: 1520.82

## 2019-01-25 ASSESSMENT — ENCOUNTER SYMPTOMS: DYSRHYTHMIAS: 1

## 2019-01-25 ASSESSMENT — LIFESTYLE VARIABLES: TOBACCO_USE: 1

## 2019-01-25 NOTE — H&P (VIEW-ONLY)
Pre-Operative H & P     CC:  Preoperative exam to assess for increased cardiopulmonary risk while undergoing surgery and anesthesia.    Date of Encounter: 1/25/2019  Primary Care Physician:  Joel Dorado  Migel Stringer is a 68 year old male who presents for pre-operative H & P in preparation for Left Atrial Appendage Closure with Dr. DOBBS on 2/5/2019 at [facility].    Migel Roche is a 68 year old male with hypertension, hyperlipidemia, aortic stenosis, mitral valve regurgitation, AAA, left renal mass, history of TIA, chronic fatigue, migraines, RLE neuropathy, chronic low back pain, GERD and insomnia that has atrial fibrillation.  He was hospitalized in August 2018 for a TIA that was thought to be related to his atrial fibrillation.  He wasn't on anticoagulation at the time.  He was previously told to not take anticoagulation due to a history of GI bleed.  This TIA episode happened well he was in the process of being worked up for a TAVR procedure.  The above listed procedure has been recommended for A-fib managment before moving on further with the TAVR preparation process.       History is obtained from the patient, his spouse and the medical record.     Past Medical History  Past Medical History:   Diagnosis Date     AAA (abdominal aortic aneurysm) (H)      Aortic stenosis      Asthma      Atrial fibrillation (H)      Chronic low back pain      Chronic nausea      GERD (gastroesophageal reflux disease)      Heart murmur      Heart rate problem      History of sleep apnea      Hyperlipidemia      Hypertension      Insomnia      Left renal mass      Migraines      Mitral regurgitation      Peripheral neuropathy      Renal mass      Status post gastric bypass for obesity      Ulcer, gastric, acute      Vomiting in adult        Past Surgical History  Past Surgical History:   Procedure Laterality Date     CHOLECYSTECTOMY       COLONOSCOPY       ESOPHAGOGASTRODUODENOSCOPY       GASTRIC BYPASS  2011      HERNIA REPAIR       SPINE SURGERY      hx of 6 lumbar surgeries and 1 thoracic surgery       Hx of Blood transfusions/reactions: yes, no reactions    Hx of abnormal bleeding or anti-platelet use: aspirin        Steroid use in the last year: none    Personal or FH with difficulty with Anesthesia:  none    Prior to Admission Medications  Current Outpatient Medications   Medication Sig Dispense Refill     albuterol (PROAIR HFA/PROVENTIL HFA/VENTOLIN HFA) 108 (90 Base) MCG/ACT Inhaler Inhale 1-2 puffs into the lungs every 6 hours as needed        amLODIPine (NORVASC) 5 MG tablet Take 1 tablet (5 mg) by mouth daily 30 tablet 11     apixaban ANTICOAGULANT (ELIQUIS) 5 MG tablet Take 1 tablet (5 mg) by mouth 2 times daily 60 tablet 11     aspirin 81 MG EC tablet Take 81 mg by mouth daily       aspirin-acetaminophen-caffeine (EXCEDRIN MIGRAINE) 250-250-65 MG per tablet Take 1 tablet by mouth as needed for headaches       atorvastatin (LIPITOR) 20 MG tablet Take 20 mg by mouth every morning       diphenhydrAMINE-acetaminophen (TYLENOL PM)  MG tablet Take 6 tablets by mouth nightly as needed        fentaNYL (DURAGESIC) 50 mcg/hr 72 hr patch Place 1 patch onto the skin every 72 hours        lisinopril (PRINIVIL/ZESTRIL) 20 MG tablet Take 1 tablet (20 mg) by mouth every morning 30 tablet 11     omeprazole (PRILOSEC) 40 MG capsule Take 1 capsule (40 mg) by mouth 2 times daily Take 30-60 minutes before a meal. 60 capsule 11     oxyCODONE-acetaminophen (PERCOCET)  MG per tablet Take 1 tablet by mouth every 4 hours as needed        pregabalin (LYRICA) 100 MG capsule Take 100 mg by mouth 4 times daily       prochlorperazine (COMPAZINE) 5 MG tablet Take 5 mg by mouth every 6 hours as needed        SUMAtriptan (IMITREX) 100 MG tablet Take 100 mg by mouth as needed       tiZANidine (ZANAFLEX) 4 MG tablet Take 4 mg by mouth 3 times daily as needed          Allergies  Allergies   Allergen Reactions     Codeine Hives and  Itching     Heparin Other (See Comments)     Thrombocytopenia, HIT not checked  Other reaction(s): Thrombocytopenia  Platelets dropped precipitously 12/29/17 during hospitalization. HIT antibodies were not checked       Social History  Social History     Socioeconomic History     Marital status:      Spouse name: Not on file     Number of children: 4     Years of education: Not on file     Highest education level: Not on file   Social Needs     Financial resource strain: Not on file     Food insecurity - worry: Not on file     Food insecurity - inability: Not on file     Transportation needs - medical: Not on file     Transportation needs - non-medical: Not on file   Occupational History     Occupation: [employer]     Employer: [employer]   Tobacco Use     Smoking status: Current Every Day Smoker     Packs/day: 1.00     Years: 50.00     Pack years: 50.00     Types: Cigarettes, Cigars     Last attempt to quit: 2016     Years since quitting: 3.0     Smokeless tobacco: Never Used     Tobacco comment: quit cigarettes in 2016.  now smokes a small cigar daily   Substance and Sexual Activity     Alcohol use: No     Drug use: No     Sexual activity: Not Currently     Partners: Female     Birth control/protection: None   Other Topics Concern     Not on file   Social History Narrative     Not on file       Family History  Family History   Problem Relation Age of Onset     Skin Cancer Mother      Chronic Obstructive Pulmonary Disease Mother      Diabetes Mother      Liver Cancer Father      Breast Cancer Father      Breast Cancer Sister      No Known Problems Brother      Diabetes Maternal Grandmother      Cancer Paternal Grandmother         unspecified cancer     No Known Problems Brother      Cancer Paternal Grandfather         unspecified cancer           ROS/MED HX  The complete review of systems is negative other than noted in the HPI or here.   ENT/Pulmonary:     (+)BHANU risk factors hypertension, daytime  "somnolence, tobacco use (quit cigarettes in 2016, but still has an occasional cigar), Current use 1 packs/day  , . .    Neurologic:     (+)neuropathy - RLE, migraines, TIA date: 8/2018 features: left arm drop,     Cardiovascular: Comment: AAA    (+) Dyslipidemia, hypertension----. Taking blood thinners Pt has received instructions: . . LUCIANO, . :. dysrhythmias a-fib, valvular problems/murmurs type: AS and MR .      METS/Exercise Tolerance:  1 - Eating, dressing   Hematologic:     (+) History of Transfusion no previous transfusion reaction -     (-) history of blood clots   Musculoskeletal:   (+) , , other musculoskeletal- chronic back pain      GI/Hepatic:     (+) GERD Asymptomatic on medication, Other GI/Hepatic chronic daily nausea and vomiting      Renal/Genitourinary:     (+) Other Renal/ Genitourinary, left renal mass      Endo:     (+) Other Endocrine Disorder chronic fatigue.      Psychiatric:     (+) psychiatric history other (comment) (insomnia)      Infectious Disease:  - neg infectious disease ROS       Malignancy:      - no malignancy   Other:    (+) H/O Chronic Pain,H/O chronic opiod use ,              Temp: 98  F (36.7  C)   BP: 106/67 Pulse: 92     SpO2: 98 %         167 lbs 0 oz  5' 9.5\"   Body mass index is 24.31 kg/m .       Physical Exam  Constitutional: Awake, alert, cooperative, no apparent distress, and appears stated age.  Eyes: Pupils equal, round and reactive to light, extra ocular muscles intact, sclera clear, conjunctiva normal.  HENT: Normocephalic, oral pharynx with moist mucus membranes.  Poor dentition with multiple missing and broken teeth. No goiter appreciated.   Respiratory: Clear to auscultation bilaterally, no crackles or wheezing.  Cardiovascular: Regular rate and rhythm, normal S1 and S2, and + murmur noted.  Carotids +2, no bruits. Trace BLE edema. Palpable pulses to radial.  Diminished pedal pulses.  GI: hyperactive bowel sounds, soft, non-distended, non-tender, no masses " palpated, no hepatosplenomegaly.    Lymph/Hematologic: No cervical lymphadenopathy and no supraclavicular lymphadenopathy.  Genitourinary:  deferred  Skin: Warm and dry.     Musculoskeletal: Full ROM of neck. There is no redness, warmth, or swelling of the exposed joints. Gross motor strength is normal.    Neurologic: Awake, alert, oriented to name, place and time. Cranial nerves II-XII are grossly intact. Gait is normal.   Neuropsychiatric: Calm, cooperative. Flat affect.     Labs: (personally reviewed)   Component      Latest Ref Rng & Units 1/25/2019   Sodium      133 - 144 mmol/L 140   Potassium      3.4 - 5.3 mmol/L 4.2   Chloride      94 - 109 mmol/L 110 (H)   Carbon Dioxide      20 - 32 mmol/L 25   Anion Gap      3 - 14 mmol/L 5   Glucose      70 - 99 mg/dL 85   Urea Nitrogen      7 - 30 mg/dL 10   Creatinine      0.66 - 1.25 mg/dL 0.74   GFR Estimate      >60 mL/min/1.73:m2 >90   GFR Estimate If Black      >60 mL/min/1.73:m2 >90   Calcium      8.5 - 10.1 mg/dL 8.2 (L)   WBC      4.0 - 11.0 10e9/L 6.7   RBC Count      4.4 - 5.9 10e12/L 4.81   Hemoglobin      13.3 - 17.7 g/dL 13.3   Hematocrit      40.0 - 53.0 % 42.6   MCV      78 - 100 fl 89   MCH      26.5 - 33.0 pg 27.7   MCHC      31.5 - 36.5 g/dL 31.2 (L)   RDW      10.0 - 15.0 % 18.7 (H)   Platelet Count      150 - 450 10e9/L 191   Heparin Induced Thrombocytopenia Screen      NEG:Negative Negative   INR      0.86 - 1.14 0.97           EKG  8/1/2018  Atrial fibrillation  Echocardiogram  8/2/2018  Interpretation Summary  The left atrial appendage is normal. It is free of spontaneous echo contrast  and thrombus.  Ostium diameter is 2.3 cm, depth is 3.0 cm.  Global and regional left ventricular function is normal with an EF of 55-60%.  The right ventricle is normal size. Global right ventricular function is  normal.  Small PFO detected by bubble study.                Left Ventricle  Normal left ventricular size. Mild concentric wall thickening consistent  with  left ventricular hypertrophy is present. Global and regional left ventricular  function is normal with an EF of 55-60%.     Right Ventricle  The right ventricle is normal size. Global right ventricular function is  normal.     Atria  The left atrial appendage is normal. It is free of spontaneous echo contrast  and thrombus. The right atria appears normal. Mild left atrial enlargement is  present. Small PFO detected by bubble study.        Mitral Valve  Mitral leaflet thickness is increased . Mild mitral insufficiency is present.     Aortic Valve  The aortic valve is tricuspid. Patient has known moderate aortic stenosis.  Doppler interrogation not done.        Cardiac Cath  7/30/2018  CORONARY ANGIOGRAM:   1. Both coronary arteries arise from their respective cusps.  2. Dominance: Right  3. The LM has 30% eccentric disease   4. LAD: Type 3 [LAD supplies the entire apex].. The LAD gives rise to septal perforators, D1 and D2.  The pLAD is patient, mLAD is patent, dLAD has a 30% stenosis, D1 has a 30% stenosis and D2 is a small branch.    5. LCX gives rise to large OM1 and OM2 vessels.  The pLCx has a 30% stenosis, mLCx has a 40% stenosis, dLCx has diffuse disease, OM1 has a proximal 30% disease% stenosis and OM2 has luminal irregularities.  6. RCA gives rise to PL branches and supplies PDA. The pRCA is patient, mRCA has a 50% stenosis with post-stenotic ectasia, dRCA has diffuse luminal irregularities.          SUMMARY:   >> High right sided filling pressures.  >> High left sided filling pressures.  >> Mild pulmonary artery hypertension  >> High left ventricular filling pressures.  >> Reduced cardiac output, 2.7 L/min with index 1.4 L/min/m2  Three vessel CAD RCA, LAD and LCX        Outside records reviewed from: Care Everywhere        ASSESSMENT and PLAN  Migel Roche is a 68 year old male not yet scheduled for a Left Atrial Appendage Closure by Dr. DOBBS in management of atrial fibrillation.  PAC referral for  risk assessment and optimization for anesthesia with comorbid conditions of:  hypertension, hyperlipidemia, aortic stenosis, mitral valve regurgitation, AAA, left renal mass, history of TIA, chronic fatigue, migraines, RLE neuropathy, chronic low back pain, GERD and insomnia.      Pre-operative considerations:  1.  Cardiac:  Functional status- METS 1-2.  The patient is minimally physically active due to chronic fatigue and generalized weakness.  He does get exertional dyspnea.  He was seen here in PAC last spring for a pre-op eval for a possible gastric bypass reversal, but due to concerns for aortic stenosis, an AAA and other findings that surgery was put on hold until he could be evaluated by cardiology.  Cardiac testing was ordered and he was referred to cardiology here.  He has consulted with cardiology and was being worked up for a TAVR, but in August he had a TIA that was thought to be related to his A-fib.  He wasn't on anticoagulation at the time due to a history of a GI bleed.  He has since been started on Eliquis (just restarted after a break due to insurance issues).  The above listed procedure has been recommended for management of the A-fib before proceeding with TAVR.  He had a cardiac cath procedure which documented severe aortic stenosis (echo noted it as moderate) and 50% RCA stenosis.  EF 55-60%. They are now monitoring the AAA which previously measures 5.1cm on outside imaging, but was noted to be 4.0cm on imaging here.  He has chronic BLE peripheral edema, exertional dyspnea and chronic fatigue.      2.  Pulm:  Airway feasible.  BHANU risk: intermediate.  He reports that he was previously diagnosed with sleep apnea and had a CPAP, but since losing over 100lbs since his gastric bypass in 2011 he stopped using the CPAP and no longer has symptoms of sleep apnea.  He has not been rechecked with a follow up sleep study though. He smoked cigarettes for approximately 50 years until 2016, but does still  smoke a small cigar daily.   He previously reported asthma, using albuterol regularly, having no maintenance medication and was wheezing on exam.   A call was placed to his primary care clinic at that time to request that a PFT be ordered.  He ended up having the PFT done here and the results showed as normal.  He continues to use the albuterol inhaler once daily because he feels like it helps him to breathe better, but denies wheezing.  No wheezing heard on exam today.  Likely that respiratory symptoms are r/t his aortic stenosis.        3.  GI:  Risk of PONV score = 1.  If > 2, anti-emetic intervention recommended.  No noted history of PONV, but he does have chronic N/V with history of gastric bypass.  He is following with Dr. Ramsey for that and gastric bypass reversal will be re-considered in the future.  Any indicated PONV prevention measures as per anesthesia DOS.  GERD is currently well controlled with omeprazole.      4. Neuro:  He has RLE peripheral neuropathy secondary to a traumatic spinal cord injury in 2004 and subsequent spinal surgeries.  He managed intermittent migraines with prn excedrin and imitrex.  Instructed to hold excedrin for 7 days prior to surgery and imitrex for 24 hours prior to surgery.  S/p TIA in August 2018 with no residual.      5. Musculoskeletal/ pain:  He is on percocet and fentanyl patch for chronic back pain.  Please see pharmD note for pain management recommendations.  Consider cautious positioning.      6. Renal: Upon Care Everywhere chart review it was noted that the patient had a CT scan in Feb 2018 that showed at 2.7cm left renal mass and there had been no follow up imaging as recommended by radiology at that time.  I encouraged the patient then to discuss with his primary care provider and request further evaluation.  He had a follow up CT scan in Nov 2018 which showed the mass at the same size and noted it as concerning for malignancy, but he reports that his doctor told  him it was just a cyst since it hadn't changed in size.  Will staff message cardiology to determine if they would like him to be evaluated by renal here in addition to his future TAVR workup.        7. Heme:  Discussed Eliquis and aspirin with Meera Mccallum RN today.  Per her instruction, patient has been told to hold his Eliquis 48 hours prior to procedure and continue his 81mg daily aspirin with no interruption.     VTE risk: 1.8%        Patient is optimized and is acceptable candidate for the proposed procedure.  No further diagnostic evaluation is needed.                 Jyothi Whitmore DNP, RN, APRN  Preoperative Assessment Center  [facility]  Phone: [phone]  Fax: [phone]

## 2019-01-25 NOTE — PHARMACY - PREOPERATIVE ASSESSMENT CENTER
Anticoagulation Note - Preoperative Assessment Center (PAC) Pharmacist     Patient seen and interviewed during time of PAC Clinic appointment January 25, 2019.  The purpose of this note is to document the perioperative anticoagulation plan outlined by the providers caring for Migel Stringer.     Current Regimen  Anticoagulation Regimen as of January 25, 2019: apixaban 5mg by mouth twice daily   Indication: afib  Expected Duration of therapy: indefinite    Perioperative plan  Migel Stringer is scheduled for watchman procedure on 2/5/19 and the perioperative anticoagulation plan outlined by his surgical team is to hold apixaban x 48 hours prior to the procedure and to continue his 81mg aspirin including the morning of the procedure.   Resumption of anticoagulation after procedure will be based on surgery team assessment of bleeding risks and complications.  This plan may require re-assessment and modification by his primary team in the perioperative setting depending on patients clinical situation.        Santos Talbert RPH  January 25, 2019  10:00 AM

## 2019-01-25 NOTE — H&P
Pre-Operative H & P     CC:  Preoperative exam to assess for increased cardiopulmonary risk while undergoing surgery and anesthesia.    Date of Encounter: 1/25/2019  Primary Care Physician:  Joel Dorado  Migel Stringer is a 68 year old male who presents for pre-operative H & P in preparation for Left Atrial Appendage Closure with Dr. DOBBS on 2/5/2019 at [facility].    Migel Roche is a 68 year old male with hypertension, hyperlipidemia, aortic stenosis, mitral valve regurgitation, AAA, left renal mass, history of TIA, chronic fatigue, migraines, RLE neuropathy, chronic low back pain, GERD and insomnia that has atrial fibrillation.  He was hospitalized in August 2018 for a TIA that was thought to be related to his atrial fibrillation.  He wasn't on anticoagulation at the time.  He was previously told to not take anticoagulation due to a history of GI bleed.  This TIA episode happened well he was in the process of being worked up for a TAVR procedure.  The above listed procedure has been recommended for A-fib managment before moving on further with the TAVR preparation process.       History is obtained from the patient, his spouse and the medical record.     Past Medical History  Past Medical History:   Diagnosis Date     AAA (abdominal aortic aneurysm) (H)      Aortic stenosis      Asthma      Atrial fibrillation (H)      Chronic low back pain      Chronic nausea      GERD (gastroesophageal reflux disease)      Heart murmur      Heart rate problem      History of sleep apnea      Hyperlipidemia      Hypertension      Insomnia      Left renal mass      Migraines      Mitral regurgitation      Peripheral neuropathy      Renal mass      Status post gastric bypass for obesity      Ulcer, gastric, acute      Vomiting in adult        Past Surgical History  Past Surgical History:   Procedure Laterality Date     CHOLECYSTECTOMY       COLONOSCOPY       ESOPHAGOGASTRODUODENOSCOPY       GASTRIC BYPASS  2011      HERNIA REPAIR       SPINE SURGERY      hx of 6 lumbar surgeries and 1 thoracic surgery       Hx of Blood transfusions/reactions: yes, no reactions    Hx of abnormal bleeding or anti-platelet use: aspirin        Steroid use in the last year: none    Personal or FH with difficulty with Anesthesia:  none    Prior to Admission Medications  Current Outpatient Medications   Medication Sig Dispense Refill     albuterol (PROAIR HFA/PROVENTIL HFA/VENTOLIN HFA) 108 (90 Base) MCG/ACT Inhaler Inhale 1-2 puffs into the lungs every 6 hours as needed        amLODIPine (NORVASC) 5 MG tablet Take 1 tablet (5 mg) by mouth daily 30 tablet 11     apixaban ANTICOAGULANT (ELIQUIS) 5 MG tablet Take 1 tablet (5 mg) by mouth 2 times daily 60 tablet 11     aspirin 81 MG EC tablet Take 81 mg by mouth daily       aspirin-acetaminophen-caffeine (EXCEDRIN MIGRAINE) 250-250-65 MG per tablet Take 1 tablet by mouth as needed for headaches       atorvastatin (LIPITOR) 20 MG tablet Take 20 mg by mouth every morning       diphenhydrAMINE-acetaminophen (TYLENOL PM)  MG tablet Take 6 tablets by mouth nightly as needed        fentaNYL (DURAGESIC) 50 mcg/hr 72 hr patch Place 1 patch onto the skin every 72 hours        lisinopril (PRINIVIL/ZESTRIL) 20 MG tablet Take 1 tablet (20 mg) by mouth every morning 30 tablet 11     omeprazole (PRILOSEC) 40 MG capsule Take 1 capsule (40 mg) by mouth 2 times daily Take 30-60 minutes before a meal. 60 capsule 11     oxyCODONE-acetaminophen (PERCOCET)  MG per tablet Take 1 tablet by mouth every 4 hours as needed        pregabalin (LYRICA) 100 MG capsule Take 100 mg by mouth 4 times daily       prochlorperazine (COMPAZINE) 5 MG tablet Take 5 mg by mouth every 6 hours as needed        SUMAtriptan (IMITREX) 100 MG tablet Take 100 mg by mouth as needed       tiZANidine (ZANAFLEX) 4 MG tablet Take 4 mg by mouth 3 times daily as needed          Allergies  Allergies   Allergen Reactions     Codeine Hives and  Itching     Heparin Other (See Comments)     Thrombocytopenia, HIT not checked  Other reaction(s): Thrombocytopenia  Platelets dropped precipitously 12/29/17 during hospitalization. HIT antibodies were not checked       Social History  Social History     Socioeconomic History     Marital status:      Spouse name: Not on file     Number of children: 4     Years of education: Not on file     Highest education level: Not on file   Social Needs     Financial resource strain: Not on file     Food insecurity - worry: Not on file     Food insecurity - inability: Not on file     Transportation needs - medical: Not on file     Transportation needs - non-medical: Not on file   Occupational History     Occupation: [employer]     Employer: [employer]   Tobacco Use     Smoking status: Current Every Day Smoker     Packs/day: 1.00     Years: 50.00     Pack years: 50.00     Types: Cigarettes, Cigars     Last attempt to quit: 2016     Years since quitting: 3.0     Smokeless tobacco: Never Used     Tobacco comment: quit cigarettes in 2016.  now smokes a small cigar daily   Substance and Sexual Activity     Alcohol use: No     Drug use: No     Sexual activity: Not Currently     Partners: Female     Birth control/protection: None   Other Topics Concern     Not on file   Social History Narrative     Not on file       Family History  Family History   Problem Relation Age of Onset     Skin Cancer Mother      Chronic Obstructive Pulmonary Disease Mother      Diabetes Mother      Liver Cancer Father      Breast Cancer Father      Breast Cancer Sister      No Known Problems Brother      Diabetes Maternal Grandmother      Cancer Paternal Grandmother         unspecified cancer     No Known Problems Brother      Cancer Paternal Grandfather         unspecified cancer           ROS/MED HX  The complete review of systems is negative other than noted in the HPI or here.   ENT/Pulmonary:     (+)BHANU risk factors hypertension, daytime  "somnolence, tobacco use (quit cigarettes in 2016, but still has an occasional cigar), Current use 1 packs/day  , . .    Neurologic:     (+)neuropathy - RLE, migraines, TIA date: 8/2018 features: left arm drop,     Cardiovascular: Comment: AAA    (+) Dyslipidemia, hypertension----. Taking blood thinners Pt has received instructions: . . LUCIANO, . :. dysrhythmias a-fib, valvular problems/murmurs type: AS and MR .      METS/Exercise Tolerance:  1 - Eating, dressing   Hematologic:     (+) History of Transfusion no previous transfusion reaction -     (-) history of blood clots   Musculoskeletal:   (+) , , other musculoskeletal- chronic back pain      GI/Hepatic:     (+) GERD Asymptomatic on medication, Other GI/Hepatic chronic daily nausea and vomiting      Renal/Genitourinary:     (+) Other Renal/ Genitourinary, left renal mass      Endo:     (+) Other Endocrine Disorder chronic fatigue.      Psychiatric:     (+) psychiatric history other (comment) (insomnia)      Infectious Disease:  - neg infectious disease ROS       Malignancy:      - no malignancy   Other:    (+) H/O Chronic Pain,H/O chronic opiod use ,              Temp: 98  F (36.7  C)   BP: 106/67 Pulse: 92     SpO2: 98 %         167 lbs 0 oz  5' 9.5\"   Body mass index is 24.31 kg/m .       Physical Exam  Constitutional: Awake, alert, cooperative, no apparent distress, and appears stated age.  Eyes: Pupils equal, round and reactive to light, extra ocular muscles intact, sclera clear, conjunctiva normal.  HENT: Normocephalic, oral pharynx with moist mucus membranes.  Poor dentition with multiple missing and broken teeth. No goiter appreciated.   Respiratory: Clear to auscultation bilaterally, no crackles or wheezing.  Cardiovascular: Regular rate and rhythm, normal S1 and S2, and + murmur noted.  Carotids +2, no bruits. Trace BLE edema. Palpable pulses to radial.  Diminished pedal pulses.  GI: hyperactive bowel sounds, soft, non-distended, non-tender, no masses " palpated, no hepatosplenomegaly.    Lymph/Hematologic: No cervical lymphadenopathy and no supraclavicular lymphadenopathy.  Genitourinary:  deferred  Skin: Warm and dry.     Musculoskeletal: Full ROM of neck. There is no redness, warmth, or swelling of the exposed joints. Gross motor strength is normal.    Neurologic: Awake, alert, oriented to name, place and time. Cranial nerves II-XII are grossly intact. Gait is normal.   Neuropsychiatric: Calm, cooperative. Flat affect.     Labs: (personally reviewed)   Component      Latest Ref Rng & Units 1/25/2019   Sodium      133 - 144 mmol/L 140   Potassium      3.4 - 5.3 mmol/L 4.2   Chloride      94 - 109 mmol/L 110 (H)   Carbon Dioxide      20 - 32 mmol/L 25   Anion Gap      3 - 14 mmol/L 5   Glucose      70 - 99 mg/dL 85   Urea Nitrogen      7 - 30 mg/dL 10   Creatinine      0.66 - 1.25 mg/dL 0.74   GFR Estimate      >60 mL/min/1.73:m2 >90   GFR Estimate If Black      >60 mL/min/1.73:m2 >90   Calcium      8.5 - 10.1 mg/dL 8.2 (L)   WBC      4.0 - 11.0 10e9/L 6.7   RBC Count      4.4 - 5.9 10e12/L 4.81   Hemoglobin      13.3 - 17.7 g/dL 13.3   Hematocrit      40.0 - 53.0 % 42.6   MCV      78 - 100 fl 89   MCH      26.5 - 33.0 pg 27.7   MCHC      31.5 - 36.5 g/dL 31.2 (L)   RDW      10.0 - 15.0 % 18.7 (H)   Platelet Count      150 - 450 10e9/L 191   Heparin Induced Thrombocytopenia Screen      NEG:Negative Negative   INR      0.86 - 1.14 0.97           EKG  8/1/2018  Atrial fibrillation  Echocardiogram  8/2/2018  Interpretation Summary  The left atrial appendage is normal. It is free of spontaneous echo contrast  and thrombus.  Ostium diameter is 2.3 cm, depth is 3.0 cm.  Global and regional left ventricular function is normal with an EF of 55-60%.  The right ventricle is normal size. Global right ventricular function is  normal.  Small PFO detected by bubble study.                Left Ventricle  Normal left ventricular size. Mild concentric wall thickening consistent  with  left ventricular hypertrophy is present. Global and regional left ventricular  function is normal with an EF of 55-60%.     Right Ventricle  The right ventricle is normal size. Global right ventricular function is  normal.     Atria  The left atrial appendage is normal. It is free of spontaneous echo contrast  and thrombus. The right atria appears normal. Mild left atrial enlargement is  present. Small PFO detected by bubble study.        Mitral Valve  Mitral leaflet thickness is increased . Mild mitral insufficiency is present.     Aortic Valve  The aortic valve is tricuspid. Patient has known moderate aortic stenosis.  Doppler interrogation not done.        Cardiac Cath  7/30/2018  CORONARY ANGIOGRAM:   1. Both coronary arteries arise from their respective cusps.  2. Dominance: Right  3. The LM has 30% eccentric disease   4. LAD: Type 3 [LAD supplies the entire apex].. The LAD gives rise to septal perforators, D1 and D2.  The pLAD is patient, mLAD is patent, dLAD has a 30% stenosis, D1 has a 30% stenosis and D2 is a small branch.    5. LCX gives rise to large OM1 and OM2 vessels.  The pLCx has a 30% stenosis, mLCx has a 40% stenosis, dLCx has diffuse disease, OM1 has a proximal 30% disease% stenosis and OM2 has luminal irregularities.  6. RCA gives rise to PL branches and supplies PDA. The pRCA is patient, mRCA has a 50% stenosis with post-stenotic ectasia, dRCA has diffuse luminal irregularities.          SUMMARY:   >> High right sided filling pressures.  >> High left sided filling pressures.  >> Mild pulmonary artery hypertension  >> High left ventricular filling pressures.  >> Reduced cardiac output, 2.7 L/min with index 1.4 L/min/m2  Three vessel CAD RCA, LAD and LCX        Outside records reviewed from: Care Everywhere        ASSESSMENT and PLAN  Migel Roche is a 68 year old male not yet scheduled for a Left Atrial Appendage Closure by Dr. DOBBS in management of atrial fibrillation.  PAC referral for  risk assessment and optimization for anesthesia with comorbid conditions of:  hypertension, hyperlipidemia, aortic stenosis, mitral valve regurgitation, AAA, left renal mass, history of TIA, chronic fatigue, migraines, RLE neuropathy, chronic low back pain, GERD and insomnia.      Pre-operative considerations:  1.  Cardiac:  Functional status- METS 1-2.  The patient is minimally physically active due to chronic fatigue and generalized weakness.  He does get exertional dyspnea.  He was seen here in PAC last spring for a pre-op eval for a possible gastric bypass reversal, but due to concerns for aortic stenosis, an AAA and other findings that surgery was put on hold until he could be evaluated by cardiology.  Cardiac testing was ordered and he was referred to cardiology here.  He has consulted with cardiology and was being worked up for a TAVR, but in August he had a TIA that was thought to be related to his A-fib.  He wasn't on anticoagulation at the time due to a history of a GI bleed.  He has since been started on Eliquis (just restarted after a break due to insurance issues).  The above listed procedure has been recommended for management of the A-fib before proceeding with TAVR.  He had a cardiac cath procedure which documented severe aortic stenosis (echo noted it as moderate) and 50% RCA stenosis.  EF 55-60%. They are now monitoring the AAA which previously measures 5.1cm on outside imaging, but was noted to be 4.0cm on imaging here.  He has chronic BLE peripheral edema, exertional dyspnea and chronic fatigue.      2.  Pulm:  Airway feasible.  BHANU risk: intermediate.  He reports that he was previously diagnosed with sleep apnea and had a CPAP, but since losing over 100lbs since his gastric bypass in 2011 he stopped using the CPAP and no longer has symptoms of sleep apnea.  He has not been rechecked with a follow up sleep study though. He smoked cigarettes for approximately 50 years until 2016, but does still  smoke a small cigar daily.   He previously reported asthma, using albuterol regularly, having no maintenance medication and was wheezing on exam.   A call was placed to his primary care clinic at that time to request that a PFT be ordered.  He ended up having the PFT done here and the results showed as normal.  He continues to use the albuterol inhaler once daily because he feels like it helps him to breathe better, but denies wheezing.  No wheezing heard on exam today.  Likely that respiratory symptoms are r/t his aortic stenosis.        3.  GI:  Risk of PONV score = 1.  If > 2, anti-emetic intervention recommended.  No noted history of PONV, but he does have chronic N/V with history of gastric bypass.  He is following with Dr. Ramsey for that and gastric bypass reversal will be re-considered in the future.  Any indicated PONV prevention measures as per anesthesia DOS.  GERD is currently well controlled with omeprazole.      4. Neuro:  He has RLE peripheral neuropathy secondary to a traumatic spinal cord injury in 2004 and subsequent spinal surgeries.  He managed intermittent migraines with prn excedrin and imitrex.  Instructed to hold excedrin for 7 days prior to surgery and imitrex for 24 hours prior to surgery.  S/p TIA in August 2018 with no residual.      5. Musculoskeletal/ pain:  He is on percocet and fentanyl patch for chronic back pain.  Please see pharmD note for pain management recommendations.  Consider cautious positioning.      6. Renal: Upon Care Everywhere chart review it was noted that the patient had a CT scan in Feb 2018 that showed at 2.7cm left renal mass and there had been no follow up imaging as recommended by radiology at that time.  I encouraged the patient then to discuss with his primary care provider and request further evaluation.  He had a follow up CT scan in Nov 2018 which showed the mass at the same size and noted it as concerning for malignancy, but he reports that his doctor told  him it was just a cyst since it hadn't changed in size.  Will staff message cardiology to determine if they would like him to be evaluated by renal here in addition to his future TAVR workup.        7. Heme:  Discussed Eliquis and aspirin with Meera Mccallum RN today.  Per her instruction, patient has been told to hold his Eliquis 48 hours prior to procedure and continue his 81mg daily aspirin with no interruption.     VTE risk: 1.8%        Patient is optimized and is acceptable candidate for the proposed procedure.  No further diagnostic evaluation is needed.                 Jyothi Whitmore DNP, RN, APRN  Preoperative Assessment Center  [facility]  Clinic and Surgery Center  Phone: [phone]  Fax: [phone]

## 2019-01-25 NOTE — PROGRESS NOTES
Date: 1/25/2019    Time of Call: 10:36 AM     Diagnosis:  Atrial fibrillation     [ TORB ] Ordering provider: Michael Diaz MD  Order:   [id number]  MARLENE interventional     Order received by: Nelli Mccallum RN     Follow-up/additional notes:   Ordered for upcoming Watchman

## 2019-01-25 NOTE — PROGRESS NOTES
Preoperative Assessment Center medication history for January 25, 2019 is complete.    See Epic admission navigator for prior to admission medications.   Operating room staff will still need to confirm medications and last dose information on day of surgery.     Medication history interview sources:  Patient Interview    Changes made to PTA medication list (reason)  Added:   -- aspirin     Deleted:   -- carafate - no longer taking    Changed:   -- tylenol pm changed to as needed per patient    Additional medication history information (including reliability of information, actions taken by pharmacist):    -- No recent (within 30 days) course of antibiotics  -- No recent (within 30 days) course of steroids  -- No recent (within 30 days) chronic daily medications stopped   -- Patient declines being on any other prescription or over-the-counter medications  -- patient averages 4-5 tablets of oxyCODONE/apap 10/325mg per day     Prior to Admission medications    Medication Sig Last Dose Taking? Auth Provider   albuterol (PROAIR HFA/PROVENTIL HFA/VENTOLIN HFA) 108 (90 Base) MCG/ACT Inhaler Inhale 1-2 puffs into the lungs every 6 hours as needed  Taking Yes Reported, Patient   amLODIPine (NORVASC) 5 MG tablet Take 1 tablet (5 mg) by mouth daily Taking Yes Wale Smyth MD   apixaban ANTICOAGULANT (ELIQUIS) 5 MG tablet Take 1 tablet (5 mg) by mouth 2 times daily Taking Yes Wale Smyth MD   aspirin 81 MG EC tablet Take 81 mg by mouth daily Taking Yes Unknown, Entered By History   atorvastatin (LIPITOR) 20 MG tablet Take 20 mg by mouth every morning Taking Yes Reported, Patient   diphenhydrAMINE-acetaminophen (TYLENOL PM)  MG tablet Take 6 tablets by mouth nightly as needed  Taking Yes Reported, Patient   fentaNYL (DURAGESIC) 50 mcg/hr 72 hr patch Place 1 patch onto the skin every 72 hours  Taking Yes Reported, Patient   lisinopril (PRINIVIL/ZESTRIL) 20 MG tablet Take 1 tablet (20 mg) by mouth every  morning Taking Yes Wale Smyth MD   omeprazole (PRILOSEC) 40 MG capsule Take 1 capsule (40 mg) by mouth 2 times daily Take 30-60 minutes before a meal. Taking Yes Hilary Rosas MD   oxyCODONE-acetaminophen (PERCOCET)  MG per tablet Take 1 tablet by mouth every 4 hours as needed  Taking Yes Reported, Patient   pregabalin (LYRICA) 100 MG capsule Take 100 mg by mouth 4 times daily Taking Yes Reported, Patient   SUMAtriptan (IMITREX) 100 MG tablet Take 100 mg by mouth as needed Taking Yes Reported, Patient   aspirin-acetaminophen-caffeine (EXCEDRIN MIGRAINE) 250-250-65 MG per tablet Take 1 tablet by mouth as needed for headaches Not Taking  Reported, Patient   prochlorperazine (COMPAZINE) 5 MG tablet Take 5 mg by mouth every 6 hours as needed  Not Taking  Reported, Patient   tiZANidine (ZANAFLEX) 4 MG tablet Take 4 mg by mouth 3 times daily as needed  Not Taking  Reported, Patient         Medication history completed by: Santos Talbert RPH

## 2019-01-25 NOTE — ANESTHESIA PREPROCEDURE EVALUATION
Anesthesia Evaluation     . Pt has had prior anesthetic. Type: General    No history of anesthetic complications          ROS/MED HX    ENT/Pulmonary:     (+)BHANU risk factors hypertension, daytime somnolence, tobacco use (quit cigarettes in 2016, but still has an occasional cigar), Current use 1 packs/day  , . .    Neurologic:     (+)neuropathy - RLE, migraines, TIA date: 8/2018 features: left arm drop,     Cardiovascular: Comment: AAA    (+) Dyslipidemia, hypertension----. Taking blood thinners Pt has received instructions: . . LUCIANO, . :. dysrhythmias a-fib, valvular problems/murmurs type: AS and MR . Previous cardiac testing Echodate:8/2018results:Interpretation Summary  The left atrial appendage is normal. It is free of spontaneous echo contrast  and thrombus.  Ostium diameter is 2.3 cm, depth is 3.0 cm.  Global and regional left ventricular function is normal with an EF of 55-60%.  The right ventricle is normal size. Global right ventricular function is  normal.  Small PFO detected by bubble study.                Left Ventricle  Normal left ventricular size. Mild concentric wall thickening consistent with  left ventricular hypertrophy is present. Global and regional left ventricular  function is normal with an EF of 55-60%.     Right Ventricle  The right ventricle is normal size. Global right ventricular function is  normal.     Atria  The left atrial appendage is normal. It is free of spontaneous echo contrast  and thrombus. The right atria appears normal. Mild left atrial enlargement is  present. Small PFO detected by bubble study.        Mitral Valve  Mitral leaflet thickness is increased . Mild mitral insufficiency is present.     Aortic Valve  The aortic valve is tricuspid. Patient has known moderate aortic stenosis.  Doppler interrogation not done.date: results:ECG reviewed date:8/1/2018 results:Atrial fibrillationCath date: 7/2018 results:CORONARY ANGIOGRAM:   1. Both coronary arteries arise from their  respective cusps.  2. Dominance: Right  3. The LM has 30% eccentric disease   4. LAD: Type 3 (LAD supplies the entire apex).. The LAD gives rise to septal perforators, D1 and D2.  The pLAD is patient, mLAD is patent, dLAD has a 30% stenosis, D1 has a 30% stenosis and D2 is a small branch.    5. LCX gives rise to large OM1 and OM2 vessels.  The pLCx has a 30% stenosis, mLCx has a 40% stenosis, dLCx has diffuse disease, OM1 has a proximal 30% disease% stenosis and OM2 has luminal irregularities.  6. RCA gives rise to PL branches and supplies PDA. The pRCA is patient, mRCA has a 50% stenosis with post-stenotic ectasia, dRCA has diffuse luminal irregularities.        LEFT HEART CATHETERIZATION:  1. LVEDP 20 mmHg.     Sheath Removal:  The catheter sheath was manually removed in the cardiac catheterization laboratory.     Contrast: Isovue, 60 ml      Fluoroscopy Time: 3.9 min     COMPLICATIONS:  1. None     SUMMARY:   >> High right sided filling pressures.  >> High left sided filling pressures.  >> Mild pulmonary artery hypertension  >> High left ventricular filling pressures.  >> Reduced cardiac output, 2.7 L/min with index 1.4 L/min/m2  Three vessel CAD RCA, LAD and LCX              METS/Exercise Tolerance:  1 - Eating, dressing   Hematologic:     (+) History of Transfusion no previous transfusion reaction -     (-) history of blood clots   Musculoskeletal:   (+) , , other musculoskeletal- chronic back pain      GI/Hepatic:     (+) GERD Asymptomatic on medication, Other GI/Hepatic chronic daily nausea and vomiting      Renal/Genitourinary:     (+) Other Renal/ Genitourinary, left renal mass      Endo:     (+) Other Endocrine Disorder chronic fatigue.      Psychiatric:     (+) psychiatric history other (comment) (insomnia)      Infectious Disease:  - neg infectious disease ROS       Malignancy:      - no malignancy   Other:    (+) H/O Chronic Pain,H/O chronic opiod use ,                    Physical Exam      Airway    Mallampati: III  TM distance: >3 FB  Neck ROM: full    Dental   (+) chipped and missing  Comment: 2 right upper broken teeth, 2 right upper missing teeth    Cardiovascular   Rhythm and rate: regular and normal  (+) peripheral edema and murmur       Pulmonary (+) wheezes                  PAC Discussion and Assessment    ASA Classification: 3  Case is suitable for: ASC OK for Surgery: [facility].  Anesthetic techniques and relevant risks discussed: GA  Invasive monitoring and risk discussed:   Types:   Possibility and Risk of blood transfusion discussed:   NPO instructions given:   Additional anesthetic preparation and risks discussed:   Needs early admission to pre-op area:   Other:     PAC Resident/NP Anesthesia Assessment:  Migel Roche is a 68 year old male not yet scheduled for a Left Atrial Appendage Closure by Dr. DOBBS in management of atrial fibrillation.  PAC referral for risk assessment and optimization for anesthesia with comorbid conditions of:  hypertension, hyperlipidemia, aortic stenosis, mitral valve regurgitation, AAA, left renal mass, history of TIA, chronic fatigue, migraines, RLE neuropathy, chronic low back pain, GERD and insomnia.      Pre-operative considerations:  1.  Cardiac:  Functional status- METS 1-2.  The patient is minimally physically active due to chronic fatigue and generalized weakness.  He does get exertional dyspnea.  He was seen here in PAC last spring for a pre-op eval for a possible gastric bypass reversal, but due to concerns for aortic stenosis, an AAA and other findings that surgery was put on hold until he could be evaluated by cardiology.  Cardiac testing was ordered and he was referred to cardiology here.  He has consulted with cardiology and was being worked up for a TAVR, but in August he had a TIA that was thought to be related to his A-fib.  He wasn't on anticoagulation at the time due to a history of a GI bleed.  He has since been started on Eliquis (just restarted  after a break due to insurance issues).  The above listed procedure has been recommended for management of the A-fib before proceeding with TAVR.  He had a cardiac cath procedure which documented severe aortic stenosis (echo noted it as moderate) and 50% RCA stenosis.  EF 55-60%. They are now monitoring the AAA which previously measures 5.1cm on outside imaging, but was noted to be 4.0cm on imaging here.  He has chronic BLE peripheral edema, exertional dyspnea and chronic fatigue.      2.  Pulm:  Airway feasible.  BHANU risk: intermediate.  He reports that he was previously diagnosed with sleep apnea and had a CPAP, but since losing over 100lbs since his gastric bypass in 2011 he stopped using the CPAP and no longer has symptoms of sleep apnea.  He has not been rechecked with a follow up sleep study though. He smoked cigarettes for approximately 50 years until 2016, but does still smoke a small cigar daily.   He previously reported asthma, using albuterol regularly, having no maintenance medication and was wheezing on exam.   A call was placed to his primary care clinic at that time to request that a PFT be ordered.  He ended up having the PFT done here and the results showed as normal.  He continues to use the albuterol inhaler once daily because he feels like it helps him to breathe better, but denies wheezing.  No wheezing heard on exam today.  Likely that respiratory symptoms are r/t his aortic stenosis.        3.  GI:  Risk of PONV score = 1.  If > 2, anti-emetic intervention recommended.  No noted history of PONV, but he does have chronic N/V with history of gastric bypass.  He is following with Dr. Ramsey for that and gastric bypass reversal will be re-considered in the future.  Any indicated PONV prevention measures as per anesthesia DOS.  GERD is currently well controlled with omeprazole.      4. Neuro:  He has RLE peripheral neuropathy secondary to a traumatic spinal cord injury in 2004 and subsequent spinal  surgeries.  He managed intermittent migraines with prn excedrin and imitrex.  Instructed to hold excedrin for 7 days prior to surgery and imitrex for 24 hours prior to surgery.  S/p TIA in August 2018 with no residual.      5. Musculoskeletal/ pain:  He is on percocet and fentanyl patch for chronic back pain.  Please see pharmD note for pain management recommendations.  Consider cautious positioning.      6. Renal: Upon Care Everywhere chart review it was noted that the patient had a CT scan in Feb 2018 that showed at 2.7cm left renal mass and there had been no follow up imaging as recommended by radiology at that time.  I encouraged the patient then to discuss with his primary care provider and request further evaluation.  He had a follow up CT scan in Nov 2018 which showed the mass at the same size and noted it as concerning for malignancy, but he reports that his doctor told him it was just a cyst since it hadn't changed in size.  Will staff message cardiology to determine if they would like him to be evaluated by renal here in addition to his future TAVR workup.        7. Heme:  Discussed Eliquis and aspirin with Meera Mccallum RN today.  Per her instruction, patient has been told to hold his Eliquis 48 hours prior to procedure and continue his 81mg daily aspirin with no interruption.     VTE risk: 1.8%        Patient is optimized and is acceptable candidate for the proposed procedure.  No further diagnostic evaluation is needed.       **For further details of assessment, testing, and physical exam please see H and P completed on same date.          Jyothi Whitmore DNP, RN, APRN            Reviewed and Signed by PAC Mid-Level Provider/Resident  Mid-Level Provider/Resident: Jyothi Whitmore DNP, RN, APRN  Date: 1/25/2019  Time: 1125    Attending Anesthesiologist Anesthesia Assessment:          Anesthesiologist:   Date:   Time:   Pass/Fail:   Disposition:     PAC Pharmacist Assessment:        Pharmacist:   Date:    Time:      Anesthesia Plan      History & Physical Review  History and physical reviewed and following examination; no interval change.    ASA Status:  3 .    NPO Status:  > 8 hours    Plan for General and ETT with Intravenous and Propofol induction. Maintenance will be Balanced.    PONV prophylaxis:  Ondansetron (or other 5HT-3) and Dexamethasone or Solumedrol  Additional equipment: Videolaryngoscope Hydromorphone preop, discussed with primary team regarding fentanyl patch for floor.    Glidescope available.      Postoperative Care  Postoperative pain management:  IV analgesics and Multi-modal analgesia.  Plan for postoperative opioid use.    Consents  Anesthetic plan, risks, benefits and alternatives discussed with:  Patient.  Use of blood products discussed: Yes.   Use of blood products discussed with Patient.  Consented to blood products.  .                          .        PHYSICAL EXAM:   Mental Status/Neuro:    Airway:    Respiratory:    CV:    Comments:      Dental:  Dental Comments: Multiple missing teeth and broken teeth                Assessment:   ASA SCORE: 3            Tobacco Use:  NO Active use of Tobacco/UNKNOWN Tobacco use status     Plan:                             PONV Management:  Adult Risk Factors:, Non-Smoker

## 2019-01-26 NOTE — RESULT ENCOUNTER NOTE
Tyree Lainez,    Your test results are attached.  Your labs are okay for surgery.         Jyothi Whitmore DNP, RN, ANP-C

## 2019-01-28 LAB — LAB SCANNED RESULT: NORMAL

## 2019-02-01 ENCOUNTER — CARE COORDINATION (OUTPATIENT)
Dept: CARDIOLOGY | Facility: CLINIC | Age: 69
End: 2019-02-01

## 2019-02-01 NOTE — PROGRESS NOTES
Left voicemail to let Ghislaine and Randall know that he should hold his Eliquis starting on Sunday and I will call on Monday to go over NPO instruction and any other questions they may have.

## 2019-02-05 ENCOUNTER — ANESTHESIA (OUTPATIENT)
Dept: CARDIOLOGY | Facility: CLINIC | Age: 69
End: 2019-02-05
Payer: COMMERCIAL

## 2019-02-05 ENCOUNTER — HOSPITAL ENCOUNTER (OUTPATIENT)
Dept: CARDIOLOGY | Facility: CLINIC | Age: 69
DRG: 274 | End: 2019-02-05
Attending: RADIOLOGY
Payer: MEDICARE

## 2019-02-05 ENCOUNTER — HOSPITAL ENCOUNTER (INPATIENT)
Facility: CLINIC | Age: 69
LOS: 1 days | Discharge: HOME | DRG: 274 | End: 2019-02-06
Admitting: INTERNAL MEDICINE
Payer: MEDICARE

## 2019-02-05 DIAGNOSIS — I48.91 A-FIB (H): ICD-10-CM

## 2019-02-05 DIAGNOSIS — I48.21 PERMANENT ATRIAL FIBRILLATION (H): ICD-10-CM

## 2019-02-05 DIAGNOSIS — I48.20 ATRIAL FIBRILLATION, CHRONIC (H): Primary | ICD-10-CM

## 2019-02-05 DIAGNOSIS — I48.20 CHRONIC ATRIAL FIBRILLATION (H): ICD-10-CM

## 2019-02-05 LAB
ABO + RH BLD: NORMAL
ABO + RH BLD: NORMAL
ANION GAP SERPL CALCULATED.3IONS-SCNC: 1 MMOL/L (ref 3–14)
BLD GP AB SCN SERPL QL: NORMAL
BLOOD BANK CMNT PATIENT-IMP: NORMAL
BUN SERPL-MCNC: 17 MG/DL (ref 7–30)
CALCIUM SERPL-MCNC: 8.2 MG/DL (ref 8.5–10.1)
CHLORIDE SERPL-SCNC: 112 MMOL/L (ref 94–109)
CO2 SERPL-SCNC: 29 MMOL/L (ref 20–32)
CREAT SERPL-MCNC: 0.86 MG/DL (ref 0.66–1.25)
ERYTHROCYTE [DISTWIDTH] IN BLOOD BY AUTOMATED COUNT: 19.2 % (ref 10–15)
GFR SERPL CREATININE-BSD FRML MDRD: 88 ML/MIN/{1.73_M2}
GLUCOSE SERPL-MCNC: 88 MG/DL (ref 70–99)
HCT VFR BLD AUTO: 36.7 % (ref 40–53)
HGB BLD-MCNC: 11.5 G/DL (ref 13.3–17.7)
INR PPP: 1.06 (ref 0.86–1.14)
MCH RBC QN AUTO: 27.6 PG (ref 26.5–33)
MCHC RBC AUTO-ENTMCNC: 31.3 G/DL (ref 31.5–36.5)
MCV RBC AUTO: 88 FL (ref 78–100)
PLATELET # BLD AUTO: 160 10E9/L (ref 150–450)
POTASSIUM SERPL-SCNC: 4.6 MMOL/L (ref 3.4–5.3)
RBC # BLD AUTO: 4.16 10E12/L (ref 4.4–5.9)
SODIUM SERPL-SCNC: 142 MMOL/L (ref 133–144)
SPECIMEN EXP DATE BLD: NORMAL
WBC # BLD AUTO: 7 10E9/L (ref 4–11)

## 2019-02-05 PROCEDURE — 86900 BLOOD TYPING SEROLOGIC ABO: CPT | Performed by: INTERNAL MEDICINE

## 2019-02-05 PROCEDURE — 25000125 ZZHC RX 250

## 2019-02-05 PROCEDURE — 25000132 ZZH RX MED GY IP 250 OP 250 PS 637: Mod: GY

## 2019-02-05 PROCEDURE — 80048 BASIC METABOLIC PNL TOTAL CA: CPT | Performed by: INTERNAL MEDICINE

## 2019-02-05 PROCEDURE — 25000125 ZZHC RX 250: Performed by: NURSE ANESTHETIST, CERTIFIED REGISTERED

## 2019-02-05 PROCEDURE — 37000009 ZZH ANESTHESIA TECHNICAL FEE, EACH ADDTL 15 MIN: Performed by: INTERNAL MEDICINE

## 2019-02-05 PROCEDURE — 25000128 H RX IP 250 OP 636: Performed by: INTERNAL MEDICINE

## 2019-02-05 PROCEDURE — 93355 ECHO TRANSESOPHAGEAL (TEE): CPT

## 2019-02-05 PROCEDURE — 37000008 ZZH ANESTHESIA TECHNICAL FEE, 1ST 30 MIN: Performed by: INTERNAL MEDICINE

## 2019-02-05 PROCEDURE — 02L73DK OCCLUSION OF LEFT ATRIAL APPENDAGE WITH INTRALUMINAL DEVICE, PERCUTANEOUS APPROACH: ICD-10-PCS | Performed by: INTERNAL MEDICINE

## 2019-02-05 PROCEDURE — 25000566 ZZH SEVOFLURANE, EA 15 MIN: Performed by: INTERNAL MEDICINE

## 2019-02-05 PROCEDURE — 36415 COLL VENOUS BLD VENIPUNCTURE: CPT | Performed by: INTERNAL MEDICINE

## 2019-02-05 PROCEDURE — C1769 GUIDE WIRE: HCPCS | Performed by: INTERNAL MEDICINE

## 2019-02-05 PROCEDURE — 86901 BLOOD TYPING SEROLOGIC RH(D): CPT | Performed by: INTERNAL MEDICINE

## 2019-02-05 PROCEDURE — 33999 UNLISTED PX CARDIAC SURGERY: CPT | Performed by: INTERNAL MEDICINE

## 2019-02-05 PROCEDURE — C1760 CLOSURE DEV, VASC: HCPCS | Performed by: INTERNAL MEDICINE

## 2019-02-05 PROCEDURE — 85347 COAGULATION TIME ACTIVATED: CPT

## 2019-02-05 PROCEDURE — A9270 NON-COVERED ITEM OR SERVICE: HCPCS | Mod: GY

## 2019-02-05 PROCEDURE — 85027 COMPLETE CBC AUTOMATED: CPT | Performed by: INTERNAL MEDICINE

## 2019-02-05 PROCEDURE — 25000128 H RX IP 250 OP 636: Performed by: NURSE ANESTHETIST, CERTIFIED REGISTERED

## 2019-02-05 PROCEDURE — 21000001 ZZH R&B HEART CARE

## 2019-02-05 PROCEDURE — 27210794 ZZH OR GENERAL SUPPLY STERILE: Performed by: INTERNAL MEDICINE

## 2019-02-05 PROCEDURE — 25000128 H RX IP 250 OP 636: Performed by: ANESTHESIOLOGY

## 2019-02-05 PROCEDURE — 85610 PROTHROMBIN TIME: CPT | Performed by: INTERNAL MEDICINE

## 2019-02-05 PROCEDURE — 93355 ECHO TRANSESOPHAGEAL (TEE): CPT | Performed by: INTERNAL MEDICINE

## 2019-02-05 PROCEDURE — 93010 ELECTROCARDIOGRAM REPORT: CPT | Performed by: INTERNAL MEDICINE

## 2019-02-05 PROCEDURE — 93005 ELECTROCARDIOGRAM TRACING: CPT

## 2019-02-05 PROCEDURE — 25000128 H RX IP 250 OP 636

## 2019-02-05 PROCEDURE — 25000125 ZZHC RX 250: Performed by: ANESTHESIOLOGY

## 2019-02-05 PROCEDURE — C1894 INTRO/SHEATH, NON-LASER: HCPCS | Performed by: INTERNAL MEDICINE

## 2019-02-05 PROCEDURE — 86850 RBC ANTIBODY SCREEN: CPT | Performed by: INTERNAL MEDICINE

## 2019-02-05 PROCEDURE — 33340 PERQ CLSR TCAT L ATR APNDGE: CPT | Mod: Q0 | Performed by: INTERNAL MEDICINE

## 2019-02-05 RX ORDER — SODIUM CHLORIDE, SODIUM LACTATE, POTASSIUM CHLORIDE, CALCIUM CHLORIDE 600; 310; 30; 20 MG/100ML; MG/100ML; MG/100ML; MG/100ML
INJECTION, SOLUTION INTRAVENOUS CONTINUOUS
Status: DISCONTINUED | OUTPATIENT
Start: 2019-02-05 | End: 2019-02-05

## 2019-02-05 RX ORDER — HYDROMORPHONE HYDROCHLORIDE 1 MG/ML
.3-.5 INJECTION, SOLUTION INTRAMUSCULAR; INTRAVENOUS; SUBCUTANEOUS EVERY 5 MIN PRN
Status: DISCONTINUED | OUTPATIENT
Start: 2019-02-05 | End: 2019-02-06 | Stop reason: HOSPADM

## 2019-02-05 RX ORDER — HYDROMORPHONE HYDROCHLORIDE 1 MG/ML
.3-.5 INJECTION, SOLUTION INTRAMUSCULAR; INTRAVENOUS; SUBCUTANEOUS EVERY 5 MIN PRN
Status: DISCONTINUED | OUTPATIENT
Start: 2019-02-05 | End: 2019-02-05

## 2019-02-05 RX ORDER — ACETAMINOPHEN 325 MG/1
325-650 TABLET ORAL EVERY 4 HOURS PRN
Status: DISCONTINUED | OUTPATIENT
Start: 2019-02-05 | End: 2019-02-06 | Stop reason: HOSPADM

## 2019-02-05 RX ORDER — ASPIRIN 81 MG/1
81 TABLET ORAL DAILY
Status: DISCONTINUED | OUTPATIENT
Start: 2019-02-06 | End: 2019-02-06 | Stop reason: HOSPADM

## 2019-02-05 RX ORDER — ONDANSETRON 4 MG/1
4 TABLET, ORALLY DISINTEGRATING ORAL EVERY 30 MIN PRN
Status: DISCONTINUED | OUTPATIENT
Start: 2019-02-05 | End: 2019-02-05

## 2019-02-05 RX ORDER — LIDOCAINE 40 MG/G
CREAM TOPICAL
Status: DISCONTINUED | OUTPATIENT
Start: 2019-02-05 | End: 2019-02-05 | Stop reason: HOSPADM

## 2019-02-05 RX ORDER — SODIUM CHLORIDE 9 MG/ML
1000 INJECTION, SOLUTION INTRAVENOUS CONTINUOUS
Status: CANCELLED | OUTPATIENT
Start: 2019-02-05

## 2019-02-05 RX ORDER — ONDANSETRON 2 MG/ML
INJECTION INTRAMUSCULAR; INTRAVENOUS PRN
Status: DISCONTINUED | OUTPATIENT
Start: 2019-02-05 | End: 2019-02-05

## 2019-02-05 RX ORDER — ONDANSETRON 2 MG/ML
4 INJECTION INTRAMUSCULAR; INTRAVENOUS EVERY 30 MIN PRN
Status: DISCONTINUED | OUTPATIENT
Start: 2019-02-05 | End: 2019-02-06 | Stop reason: HOSPADM

## 2019-02-05 RX ORDER — FENTANYL 50 UG/1
50 PATCH TRANSDERMAL
Status: DISCONTINUED | OUTPATIENT
Start: 2019-02-05 | End: 2019-02-06 | Stop reason: HOSPADM

## 2019-02-05 RX ORDER — CEFAZOLIN SODIUM 2 G/100ML
2 INJECTION, SOLUTION INTRAVENOUS
Status: COMPLETED | OUTPATIENT
Start: 2019-02-05 | End: 2019-02-05

## 2019-02-05 RX ORDER — LIDOCAINE 40 MG/G
CREAM TOPICAL
Status: CANCELLED | OUTPATIENT
Start: 2019-02-05

## 2019-02-05 RX ORDER — EPHEDRINE SULFATE 50 MG/ML
INJECTION, SOLUTION INTRAMUSCULAR; INTRAVENOUS; SUBCUTANEOUS PRN
Status: DISCONTINUED | OUTPATIENT
Start: 2019-02-05 | End: 2019-02-05

## 2019-02-05 RX ORDER — HYDROCODONE BITARTRATE AND ACETAMINOPHEN 5; 325 MG/1; MG/1
1-2 TABLET ORAL EVERY 4 HOURS PRN
Status: DISCONTINUED | OUTPATIENT
Start: 2019-02-05 | End: 2019-02-05

## 2019-02-05 RX ORDER — ONDANSETRON 4 MG/1
4 TABLET, ORALLY DISINTEGRATING ORAL EVERY 30 MIN PRN
Status: DISCONTINUED | OUTPATIENT
Start: 2019-02-05 | End: 2019-02-06 | Stop reason: HOSPADM

## 2019-02-05 RX ORDER — PREGABALIN 100 MG/1
100 CAPSULE ORAL 4 TIMES DAILY
Status: DISCONTINUED | OUTPATIENT
Start: 2019-02-05 | End: 2019-02-06 | Stop reason: HOSPADM

## 2019-02-05 RX ORDER — CEFAZOLIN SODIUM 2 G/100ML
2 INJECTION, SOLUTION INTRAVENOUS
Status: CANCELLED | OUTPATIENT
Start: 2019-02-05

## 2019-02-05 RX ORDER — DEXAMETHASONE SODIUM PHOSPHATE 4 MG/ML
INJECTION, SOLUTION INTRA-ARTICULAR; INTRALESIONAL; INTRAMUSCULAR; INTRAVENOUS; SOFT TISSUE PRN
Status: DISCONTINUED | OUTPATIENT
Start: 2019-02-05 | End: 2019-02-05

## 2019-02-05 RX ORDER — POTASSIUM CHLORIDE 1500 MG/1
20 TABLET, EXTENDED RELEASE ORAL
Status: DISCONTINUED | OUTPATIENT
Start: 2019-02-05 | End: 2019-02-05 | Stop reason: HOSPADM

## 2019-02-05 RX ORDER — NICOTINE 21 MG/24HR
1 PATCH, TRANSDERMAL 24 HOURS TRANSDERMAL EVERY 24 HOURS
COMMUNITY
End: 2019-09-26

## 2019-02-05 RX ORDER — HEPARIN SODIUM 1000 [USP'U]/ML
INJECTION, SOLUTION INTRAVENOUS; SUBCUTANEOUS
Status: DISCONTINUED | OUTPATIENT
Start: 2019-02-05 | End: 2019-02-05 | Stop reason: HOSPADM

## 2019-02-05 RX ORDER — NEOSTIGMINE METHYLSULFATE 1 MG/ML
VIAL (ML) INJECTION PRN
Status: DISCONTINUED | OUTPATIENT
Start: 2019-02-05 | End: 2019-02-05

## 2019-02-05 RX ORDER — SODIUM CHLORIDE, SODIUM LACTATE, POTASSIUM CHLORIDE, CALCIUM CHLORIDE 600; 310; 30; 20 MG/100ML; MG/100ML; MG/100ML; MG/100ML
INJECTION, SOLUTION INTRAVENOUS CONTINUOUS
Status: DISCONTINUED | OUTPATIENT
Start: 2019-02-05 | End: 2019-02-06 | Stop reason: HOSPADM

## 2019-02-05 RX ORDER — LIDOCAINE HYDROCHLORIDE 10 MG/ML
INJECTION, SOLUTION EPIDURAL; INFILTRATION; INTRACAUDAL; PERINEURAL
Status: DISCONTINUED | OUTPATIENT
Start: 2019-02-05 | End: 2019-02-05 | Stop reason: HOSPADM

## 2019-02-05 RX ORDER — LISINOPRIL 20 MG/1
20 TABLET ORAL EVERY MORNING
Status: DISCONTINUED | OUTPATIENT
Start: 2019-02-06 | End: 2019-02-06 | Stop reason: HOSPADM

## 2019-02-05 RX ORDER — FENTANYL CITRATE 50 UG/ML
25-50 INJECTION, SOLUTION INTRAMUSCULAR; INTRAVENOUS
Status: DISCONTINUED | OUTPATIENT
Start: 2019-02-05 | End: 2019-02-06 | Stop reason: HOSPADM

## 2019-02-05 RX ORDER — PROPOFOL 10 MG/ML
INJECTION, EMULSION INTRAVENOUS PRN
Status: DISCONTINUED | OUTPATIENT
Start: 2019-02-05 | End: 2019-02-05

## 2019-02-05 RX ORDER — ALBUTEROL SULFATE 90 UG/1
1-2 AEROSOL, METERED RESPIRATORY (INHALATION) EVERY 6 HOURS PRN
Status: DISCONTINUED | OUTPATIENT
Start: 2019-02-05 | End: 2019-02-06 | Stop reason: HOSPADM

## 2019-02-05 RX ORDER — SODIUM CHLORIDE 9 MG/ML
1000 INJECTION, SOLUTION INTRAVENOUS CONTINUOUS
Status: ACTIVE | OUTPATIENT
Start: 2019-02-05 | End: 2019-02-05

## 2019-02-05 RX ORDER — NICOTINE 21 MG/24HR
1 PATCH, TRANSDERMAL 24 HOURS TRANSDERMAL EVERY 24 HOURS
Status: DISCONTINUED | OUTPATIENT
Start: 2019-02-05 | End: 2019-02-06 | Stop reason: HOSPADM

## 2019-02-05 RX ORDER — LIDOCAINE HYDROCHLORIDE 20 MG/ML
INJECTION, SOLUTION INFILTRATION; PERINEURAL PRN
Status: DISCONTINUED | OUTPATIENT
Start: 2019-02-05 | End: 2019-02-05

## 2019-02-05 RX ORDER — AMLODIPINE BESYLATE 5 MG/1
5 TABLET ORAL DAILY
Status: DISCONTINUED | OUTPATIENT
Start: 2019-02-06 | End: 2019-02-06 | Stop reason: HOSPADM

## 2019-02-05 RX ORDER — NALOXONE HYDROCHLORIDE 0.4 MG/ML
.1-.4 INJECTION, SOLUTION INTRAMUSCULAR; INTRAVENOUS; SUBCUTANEOUS
Status: DISCONTINUED | OUTPATIENT
Start: 2019-02-05 | End: 2019-02-05

## 2019-02-05 RX ORDER — NALOXONE HYDROCHLORIDE 0.4 MG/ML
.1-.4 INJECTION, SOLUTION INTRAMUSCULAR; INTRAVENOUS; SUBCUTANEOUS
Status: DISCONTINUED | OUTPATIENT
Start: 2019-02-05 | End: 2019-02-06 | Stop reason: HOSPADM

## 2019-02-05 RX ORDER — HYDROMORPHONE HYDROCHLORIDE 1 MG/ML
0.5 INJECTION, SOLUTION INTRAMUSCULAR; INTRAVENOUS; SUBCUTANEOUS
Status: COMPLETED | OUTPATIENT
Start: 2019-02-05 | End: 2019-02-05

## 2019-02-05 RX ORDER — SODIUM CHLORIDE 9 MG/ML
1000 INJECTION, SOLUTION INTRAVENOUS CONTINUOUS
Status: DISCONTINUED | OUTPATIENT
Start: 2019-02-05 | End: 2019-02-05 | Stop reason: HOSPADM

## 2019-02-05 RX ORDER — SUMATRIPTAN 50 MG/1
100 TABLET, FILM COATED ORAL 2 TIMES DAILY PRN
Status: DISCONTINUED | OUTPATIENT
Start: 2019-02-05 | End: 2019-02-06 | Stop reason: HOSPADM

## 2019-02-05 RX ORDER — OXYCODONE AND ACETAMINOPHEN 10; 325 MG/1; MG/1
1 TABLET ORAL EVERY 4 HOURS PRN
Status: DISCONTINUED | OUTPATIENT
Start: 2019-02-05 | End: 2019-02-06 | Stop reason: HOSPADM

## 2019-02-05 RX ORDER — ONDANSETRON 2 MG/ML
4 INJECTION INTRAMUSCULAR; INTRAVENOUS EVERY 30 MIN PRN
Status: DISCONTINUED | OUTPATIENT
Start: 2019-02-05 | End: 2019-02-05

## 2019-02-05 RX ORDER — FENTANYL CITRATE 50 UG/ML
INJECTION, SOLUTION INTRAMUSCULAR; INTRAVENOUS PRN
Status: DISCONTINUED | OUTPATIENT
Start: 2019-02-05 | End: 2019-02-05

## 2019-02-05 RX ORDER — GLYCOPYRROLATE 0.2 MG/ML
INJECTION, SOLUTION INTRAMUSCULAR; INTRAVENOUS PRN
Status: DISCONTINUED | OUTPATIENT
Start: 2019-02-05 | End: 2019-02-05

## 2019-02-05 RX ORDER — ATORVASTATIN CALCIUM 20 MG/1
20 TABLET, FILM COATED ORAL EVERY MORNING
Status: DISCONTINUED | OUTPATIENT
Start: 2019-02-06 | End: 2019-02-06 | Stop reason: HOSPADM

## 2019-02-05 RX ORDER — POTASSIUM CHLORIDE 1500 MG/1
20 TABLET, EXTENDED RELEASE ORAL
Status: CANCELLED | OUTPATIENT
Start: 2019-02-05

## 2019-02-05 RX ORDER — PROCHLORPERAZINE MALEATE 5 MG
5 TABLET ORAL EVERY 6 HOURS PRN
Status: DISCONTINUED | OUTPATIENT
Start: 2019-02-05 | End: 2019-02-06 | Stop reason: HOSPADM

## 2019-02-05 RX ADMIN — Medication 0.5 MG: at 07:54

## 2019-02-05 RX ADMIN — SODIUM CHLORIDE, POTASSIUM CHLORIDE, SODIUM LACTATE AND CALCIUM CHLORIDE: 600; 310; 30; 20 INJECTION, SOLUTION INTRAVENOUS at 07:51

## 2019-02-05 RX ADMIN — OXYCODONE HYDROCHLORIDE AND ACETAMINOPHEN 1 TABLET: 10; 325 TABLET ORAL at 15:29

## 2019-02-05 RX ADMIN — Medication 5 MG: at 08:36

## 2019-02-05 RX ADMIN — HYDROMORPHONE HYDROCHLORIDE 0.5 MG: 1 INJECTION, SOLUTION INTRAMUSCULAR; INTRAVENOUS; SUBCUTANEOUS at 10:30

## 2019-02-05 RX ADMIN — HYDROMORPHONE HYDROCHLORIDE 0.5 MG: 1 INJECTION, SOLUTION INTRAMUSCULAR; INTRAVENOUS; SUBCUTANEOUS at 11:19

## 2019-02-05 RX ADMIN — PHENYLEPHRINE HYDROCHLORIDE 100 MCG: 10 INJECTION, SOLUTION INTRAMUSCULAR; INTRAVENOUS; SUBCUTANEOUS at 08:33

## 2019-02-05 RX ADMIN — HYDROMORPHONE HYDROCHLORIDE 0.5 MG: 1 INJECTION, SOLUTION INTRAMUSCULAR; INTRAVENOUS; SUBCUTANEOUS at 10:47

## 2019-02-05 RX ADMIN — PHENYLEPHRINE HYDROCHLORIDE 100 MCG: 10 INJECTION, SOLUTION INTRAMUSCULAR; INTRAVENOUS; SUBCUTANEOUS at 08:22

## 2019-02-05 RX ADMIN — HYDROMORPHONE HYDROCHLORIDE 0.5 MG: 1 INJECTION, SOLUTION INTRAMUSCULAR; INTRAVENOUS; SUBCUTANEOUS at 11:06

## 2019-02-05 RX ADMIN — CEFAZOLIN SODIUM 2 G: 2 INJECTION, SOLUTION INTRAVENOUS at 08:20

## 2019-02-05 RX ADMIN — ROCURONIUM BROMIDE 50 MG: 10 INJECTION INTRAVENOUS at 08:07

## 2019-02-05 RX ADMIN — FENTANYL 1 PATCH: 50 PATCH TRANSDERMAL at 13:09

## 2019-02-05 RX ADMIN — APIXABAN 5 MG: 5 TABLET, FILM COATED ORAL at 20:02

## 2019-02-05 RX ADMIN — LIDOCAINE HYDROCHLORIDE 1 ML: 10 INJECTION, SOLUTION EPIDURAL; INFILTRATION; INTRACAUDAL; PERINEURAL at 07:54

## 2019-02-05 RX ADMIN — PHENYLEPHRINE HYDROCHLORIDE 100 MCG: 10 INJECTION, SOLUTION INTRAMUSCULAR; INTRAVENOUS; SUBCUTANEOUS at 08:51

## 2019-02-05 RX ADMIN — PROPOFOL 140 MG: 10 INJECTION, EMULSION INTRAVENOUS at 08:07

## 2019-02-05 RX ADMIN — PREGABALIN 100 MG: 100 CAPSULE ORAL at 13:05

## 2019-02-05 RX ADMIN — NEOSTIGMINE METHYLSULFATE 4 MG: 1 INJECTION, SOLUTION INTRAVENOUS at 09:39

## 2019-02-05 RX ADMIN — NICOTINE 1 PATCH: 21 PATCH, EXTENDED RELEASE TRANSDERMAL at 13:10

## 2019-02-05 RX ADMIN — SODIUM CHLORIDE 1000 ML: 9 INJECTION, SOLUTION INTRAVENOUS at 12:09

## 2019-02-05 RX ADMIN — PROCHLORPERAZINE EDISYLATE 5 MG: 5 INJECTION INTRAMUSCULAR; INTRAVENOUS at 20:02

## 2019-02-05 RX ADMIN — PHENYLEPHRINE HYDROCHLORIDE 100 MCG: 10 INJECTION, SOLUTION INTRAMUSCULAR; INTRAVENOUS; SUBCUTANEOUS at 08:28

## 2019-02-05 RX ADMIN — FENTANYL CITRATE 25 MCG: 50 INJECTION, SOLUTION INTRAMUSCULAR; INTRAVENOUS at 09:34

## 2019-02-05 RX ADMIN — ONDANSETRON 4 MG: 2 INJECTION INTRAMUSCULAR; INTRAVENOUS at 09:39

## 2019-02-05 RX ADMIN — SODIUM CHLORIDE 1000 ML: 9 INJECTION, SOLUTION INTRAVENOUS at 07:53

## 2019-02-05 RX ADMIN — MIDAZOLAM 2 MG: 1 INJECTION INTRAMUSCULAR; INTRAVENOUS at 08:00

## 2019-02-05 RX ADMIN — DEXAMETHASONE SODIUM PHOSPHATE 4 MG: 4 INJECTION, SOLUTION INTRA-ARTICULAR; INTRALESIONAL; INTRAMUSCULAR; INTRAVENOUS; SOFT TISSUE at 08:38

## 2019-02-05 RX ADMIN — FENTANYL CITRATE 25 MCG: 50 INJECTION, SOLUTION INTRAMUSCULAR; INTRAVENOUS at 08:00

## 2019-02-05 RX ADMIN — PREGABALIN 100 MG: 100 CAPSULE ORAL at 17:20

## 2019-02-05 RX ADMIN — SUMATRIPTAN SUCCINATE 100 MG: 50 TABLET ORAL at 20:02

## 2019-02-05 RX ADMIN — SUMATRIPTAN SUCCINATE 100 MG: 50 TABLET ORAL at 17:20

## 2019-02-05 RX ADMIN — SODIUM CHLORIDE, POTASSIUM CHLORIDE, SODIUM LACTATE AND CALCIUM CHLORIDE: 600; 310; 30; 20 INJECTION, SOLUTION INTRAVENOUS at 09:30

## 2019-02-05 RX ADMIN — OXYCODONE HYDROCHLORIDE AND ACETAMINOPHEN 1 TABLET: 10; 325 TABLET ORAL at 20:02

## 2019-02-05 RX ADMIN — PHENYLEPHRINE HYDROCHLORIDE 0.4 MCG/KG/MIN: 10 INJECTION, SOLUTION INTRAMUSCULAR; INTRAVENOUS; SUBCUTANEOUS at 08:48

## 2019-02-05 RX ADMIN — FENTANYL CITRATE 50 MCG: 50 INJECTION, SOLUTION INTRAMUSCULAR; INTRAVENOUS at 09:09

## 2019-02-05 RX ADMIN — GLYCOPYRROLATE 0.8 MG: 0.2 INJECTION, SOLUTION INTRAMUSCULAR; INTRAVENOUS at 09:39

## 2019-02-05 RX ADMIN — PHENYLEPHRINE HYDROCHLORIDE 100 MCG: 10 INJECTION, SOLUTION INTRAMUSCULAR; INTRAVENOUS; SUBCUTANEOUS at 08:36

## 2019-02-05 RX ADMIN — LIDOCAINE HYDROCHLORIDE 80 MG: 20 INJECTION, SOLUTION INFILTRATION; PERINEURAL at 08:07

## 2019-02-05 ASSESSMENT — ACTIVITIES OF DAILY LIVING (ADL): SWALLOWING: 0-->SWALLOWS FOODS/LIQUIDS WITHOUT DIFFICULTY

## 2019-02-05 ASSESSMENT — MIFFLIN-ST. JEOR: SCORE: 1532.16

## 2019-02-05 NOTE — PROGRESS NOTES
Admission medication history interview status for the 2/5/2019  admission is complete. See EPIC admission navigator for prior to admission medications     Medication history source reliability:Good    Medication history interview source(s):Patient    Medication history resources (including written lists, pill bottles, clinic record):Patient has alist with him    Primary pharmacy.[facility]    Additional medication history information not noted on PTA med list :None    Time spent in this activity: 40 minutes    Prior to Admission medications    Medication Sig Last Dose Taking? Auth Provider   albuterol (PROAIR HFA/PROVENTIL HFA/VENTOLIN HFA) 108 (90 Base) MCG/ACT Inhaler Inhale 1-2 puffs into the lungs every 6 hours as needed  More than a Month at PRN Yes Reported, Patient   amLODIPine (NORVASC) 5 MG tablet Take 1 tablet (5 mg) by mouth daily 2/5/2019 at 0300 Yes Wale Smyth MD   apixaban ANTICOAGULANT (ELIQUIS) 5 MG tablet Take 1 tablet (5 mg) by mouth 2 times daily 2/2/2019 at PM Yes Wale Smyth MD   aspirin 81 MG EC tablet Take 81 mg by mouth daily 2/5/2019 at 0300 Yes Unknown, Entered By History   atorvastatin (LIPITOR) 20 MG tablet Take 20 mg by mouth every morning 2/5/2019 at 0300 Yes Reported, Patient   diphenhydrAMINE-acetaminophen (TYLENOL PM)  MG tablet Take 6 tablets by mouth nightly as needed  2/4/2019 at HS Yes Reported, Patient   fentaNYL (DURAGESIC) 50 mcg/hr 72 hr patch Place 1 patch onto the skin every 72 hours  2/2/2019 (Inplace) Yes Reported, Patient   lisinopril (PRINIVIL/ZESTRIL) 20 MG tablet Take 1 tablet (20 mg) by mouth every morning 2/5/2019 at 0300 Yes Wale Smyth MD   nicotine (NICODERM CQ) 21 MG/24HR 24 hr patch Place 1 patch onto the skin every 24 hours 2/4/2019 (not in place) at Unknown time Yes Reported, Patient   omeprazole (PRILOSEC) 40 MG capsule Take 1 capsule (40 mg) by mouth 2 times daily Take 30-60 minutes before a meal. 2/5/2019 at 0300 Yes  Hilary Rosas MD   oxyCODONE-acetaminophen (PERCOCET)  MG per tablet Take 1 tablet by mouth every 4 hours as needed  2/4/2019 at 1830 Yes Reported, Patient   pregabalin (LYRICA) 100 MG capsule Take 100 mg by mouth 4 times daily 2/5/2019 at 0300 Yes Reported, Patient   prochlorperazine (COMPAZINE) 5 MG tablet Take 5 mg by mouth every 6 hours as needed  Past Week at PRN Yes Reported, Patient   SUMAtriptan (IMITREX) 100 MG tablet Take 100 mg by mouth 2 times daily as needed  Past Week at PRN Yes Reported, Patient   tiZANidine (ZANAFLEX) 4 MG tablet Take 4 mg by mouth 3 times daily as needed  More than a Month at PRN Yes Reported, Patient

## 2019-02-05 NOTE — Clinical Note
0 : 22.8. 45 : 22.9. 90 : 27.2. 135 : 23.5. 0 : 30.3. 45 : 41.3. 90 : 38.1. 135 : 37.1. 0 : 25.1 . 45 : 25.1 . 90 : 25.9 . 135 : 24.8 . BONITA type used: Wind SockPosition: Passed. Hixson: Passed. Size: Accepted. Seal: Complete. Jet: 0.DEPLOYED.

## 2019-02-05 NOTE — PROCEDURES
LEFT ATRIAL APPENDAGE CLOSURE PROCEDURE REPORT:     PROCEDURES PERFORMED:   1. Left atrial catheterization.  2. Successful left atrial appendage closure with a 30mm Watchman device.  3. Venotomy closure with a figure of eight suture.    PHYSICIANS:  1. Attending Interventional Cardiology Staff: MD Joe; MD Rashid  2. Structural Interventional Cardiology Fellow: Flako Pickens MD     INDICATION:  Migel Stringer is a 68 year old year-old patient with atrial fibrillation and unable to undergo long-term anticoagulation therapy who presents on an elective outpatient basis for left atrial appendage closure with a Watchman device.    DESCRIPTION:  1. Consent obtained with discussion of risks.  All questions were answered.  2. Sterile prep and procedure per OR protocol.  3. Location: right common femoral vein.  4. Access: Local anesthetic with lidocaine.  A standard (18 g) needle with ultrasound guidance was used to establish vascular access using a modified Seldinger technique.  5. Sheath: 16Fr Cook sheath and 14Fr Watchman sheath  6. Catheters: 6Fr angled pigtail.  7. Estimated blood loss of <5 mL.  8. See below for procedure details.    MEDICATIONS:  1. Sedation per anesthesia.  2. Heparin administered to achieve a goal ACT > 300 sec per anesthesia.   3. Protamine IV.    SUMMARY:  1. Prior to access, the patient was intubated by anesthesia and transesophageal echocardiography confirmed there was no thrombus in the left atrial appendage.  Access was obtained in the right common femoral vein under ultrasound guidance.       2. A 16Fr sheath was inserted into the common femoral vein.  An 8.5Fr SL-1 trans-septal access system was advanced over an 0.032 guidewire and positioned on the inferior and posterior aspect of the fossa ovalis under transesophageal and fluoroscopic guidance.  Proper alignment of the system was obtained by tenting the septum under MARLENE guidance in 2 orthogonal views (bicaval and short  axis).  A Heartspan needle was used to access the left atrium.  The interatrial septum was crossed without difficulty.  3.  The introducer was advanced over the needle and finally the sheath was advanced over the introducer. The needle and introducer were withdrawn while the sheath was left in the LA.  The mean left atrial pressure was measured as 14 mmHg.  4. A Safari (260 cm) wire was then advanced to the left atrium to serve as a rail during guide catheter exchange.  5. The 14Fr double curved Watchman Access System was gently advanced to the left atrium.    6. A 6Fr pigtail catheter was used to selectively engage the left atrial appendage and then the Watchman Access System was advanced into the left atrial appendage. Selective injections of the left atrial appendage were obtained to further define the anatomy and confirm proper sizing of the device.   7. The maximum left atrial appendage ostial diameter obtained by transesophageal echocardiogram images in 4 views (0, 45, 90 and 135 degrees) and the appendage depth were used to confirm the optimal device size of 30 mm.   8. Following removal of the pigtail catheter, the Watchman Delivery System was advanced through the Watchman Access System and positioned in the left atrial appendage with distal access and delivery system markers aligned.  The device was slowly deployed under fluoroscopic and transesophageal guidance.  9. Prior to releasing the device, proper position, anchoring, sizing with compression and seal were confirmed.  A compression of approximately 18% was measured. No leak was noted around the device.  The device was released with counterclockwise rotation of the deployment knob.   10. The Watchman Access and Delivery systems were removed.  11. A figure of eight suture was place above the vascular access site with successful hemostasis.    NOTABLE EVENTS:  1. None    COMPLICATIONS:  1. None    SUMMARY:    1. Atrial fibrillation with inability to  undergo long-term anticoagulation secondary to GI bleed.  2. Left atrial mean pressure of 14 mmHg.  3. Successful left atrial appendage closure with a 30 mm Watchman device with 18% compression of the device and no leak identified.  4. Venous access site closed with a figure of eight closure.    PLAN:    1. Aspirin 81 mg po daily lifelong.  2. Oral anticoagulation with apixaban for at least 45 days.  3. Bedrest per protocol.  4. Admit to the primary inpatient team for further evaluation and management.       SHANNON Pickens MD, PhD  Structural Cardiology Fellow      I was present for the entire procedure.     Michael Diaz M.D.  Interventional Cardiology  [facility]  Pager: [phone]

## 2019-02-05 NOTE — Clinical Note
Potential access sites were evaluated for patency using ultrasound.   The US used to access RFV   selected.   Access was obtained under ultrasound guidance with direct visualization of needle entry.

## 2019-02-05 NOTE — ANESTHESIA POSTPROCEDURE EVALUATION
Patient: Migel Stringer    Procedure(s):  Left Atrial Appendage Closure    Diagnosis:afib  Diagnosis Additional Information: No value filed.    Anesthesia Type:  General, ETT    Note:  Anesthesia Post Evaluation    Patient location during evaluation: PACU  Patient participation: Able to fully participate in evaluation  Level of consciousness: awake and alert  Pain management: adequate  Airway patency: patent  Cardiovascular status: acceptable and hemodynamically stable  Respiratory status: acceptable and unassisted  Hydration status: acceptable  PONV: none             Last vitals:  Vitals:    02/05/19 1230 02/05/19 1300 02/05/19 1400   BP: 112/74 112/70 111/79   Pulse:      Resp: 12 12 13   Temp:      SpO2: 96% 97% 97%         Electronically Signed By: Theo Winters DO  February 5, 2019  3:14 PM

## 2019-02-05 NOTE — DISCHARGE INSTRUCTIONS
Left Atrial Appendage Closure Discharge Instructions    After you go home:    Have an adult stay with you until tomorrow.    You may eat your normal diet, unless your doctor tells you otherwise.    Increase fluid intake for two days.    Continue to use Incentive Spirometer, if given to you, 4-5 times every two hours while awake for 2 days, then throw away.       For 24-48 hours (due to the sedation you received):     Do NOT make any important or legal decisions.    Do NOT drive or operate machines at home or at work.    Do NOT drink alcohol.    Care of Puncture Site:    Check the puncture site every 1-2 hours while awake.    For 2-3 days, when you cough, sneeze, laugh or move your bowels, hold your hand over the puncture site and press firmly.    Remove the band aid after 24 hours. If there is minor oozing, apply another band aid and remove it after 12 hours.    It is normal to have a small bruise or pea size lump at the site.    Ok to use ice packs at groin sites 20 minutes at a time for groin discomfort    You may shower. Do NOT take a bath, or use a hot tub or pool until groin site heals, which may take up to a week.  Do NOT scrub the site. Do not use lotion or powder near the puncture site.    Activity:    Do NOT lift, push or pull more than 10 pounds for 5 days.    NO repetitive motions such as loading  or vacuuming.     NO jogging, exercise classes, sports or vigorous activities for at least 5-7 days.    JUST relax and take it easy for 5 days.     REMINDER:  DO NOT DRIVE FOR 2-3 DAYS!    Bleeding:    If you start bleeding from the groin site, lie down flat and press firmly on the site for 10 minutes or until bleeding stops.     Once bleeding stops lay flat for 2 hours.        Go to ER or Call 911 right away if you have heavy bleeding or with bleeding that does not stop.    Medications:    Take your medications, including anticoagulants (blood thinners), unless your doctor tells you not to.    If you  have stopped any other medicines, check with your nurse or provider about when to restart them.    If you have pain, you may take Tylenol (acetaminophen) or Advil (ibuprofen), if needing to take ibuprofen please take with food and limit the dose as this will increase you risk to bleed when taken with an anticoagulant. .    DENTAL VISITS: you will need antibiotics before you have a dental procedure for 6 months after Watchman is placed.        Call the Watchman Care Coordinator (Leslye KNIGHT) if:    Having Chest pain    Shortness of Breath    You have increased pain or a large or growing hard lump around the site.    The site is red, swollen, hot or tender.    Blood or fluid is draining from the site.    You have chills or a fever greater than 101 F (38 C).    Your leg feels numb, cool or changes color.    If groin pain is not relieved by Advil or Tylenol.    Any questions or concerns.    Call 911 for signs/symptoms of stroke:    Visual disturbance    Problems with talking    Smile only occurs on half your face    Numbness on one side    Sudden headache    Confusion    Problems with walking or balance.       Follow Up Appointments:    You will receive a call from Leslye with your follow ups.        [facility]: Leslye KNIGHT   [phone] option #1, then option #3 (Mon-Fri, 8:00-5:00)  or    call [phone] for on-call Cardiologist after clinic hours  (7 days a week).

## 2019-02-05 NOTE — ANESTHESIA CARE TRANSFER NOTE
Patient: Migel Stringer    Procedure(s):  Left Atrial Appendage Closure    Diagnosis: afib  Diagnosis Additional Information: No value filed.    Anesthesia Type:   General, ETT     Note:  Airway :Face Mask  Patient transferred to:PACU  Comments: Patient awake, extubated, and transferred to PACU. VS stable and report given to RN. Handoff Report: Identifed the Patient, Identified the Reponsible Provider, Reviewed the pertinent medical history, Discussed the surgical course, Reviewed Intra-OP anesthesia mangement and issues during anesthesia, Set expectations for post-procedure period and Allowed opportunity for questions and acknowledgement of understanding      Vitals: (Last set prior to Anesthesia Care Transfer)    CRNA VITALS  2/5/2019 0921 - 2/5/2019 1000      2/5/2019             NIBP:  143/99  (Abnormal)     NIBP Mean:  103                Electronically Signed By: JUAN CARLOS Cristina CRNA  February 5, 2019  10:00 AM

## 2019-02-06 VITALS
WEIGHT: 166.6 LBS | OXYGEN SATURATION: 96 % | HEIGHT: 70 IN | TEMPERATURE: 98.4 F | RESPIRATION RATE: 17 BRPM | HEART RATE: 61 BPM | SYSTOLIC BLOOD PRESSURE: 131 MMHG | BODY MASS INDEX: 23.85 KG/M2 | DIASTOLIC BLOOD PRESSURE: 84 MMHG

## 2019-02-06 LAB
ANION GAP SERPL CALCULATED.3IONS-SCNC: 5 MMOL/L (ref 3–14)
APTT PPP: 36 SEC (ref 22–37)
BUN SERPL-MCNC: 14 MG/DL (ref 7–30)
CALCIUM SERPL-MCNC: 8.4 MG/DL (ref 8.5–10.1)
CHLORIDE SERPL-SCNC: 112 MMOL/L (ref 94–109)
CO2 SERPL-SCNC: 25 MMOL/L (ref 20–32)
CREAT SERPL-MCNC: 0.7 MG/DL (ref 0.66–1.25)
ERYTHROCYTE [DISTWIDTH] IN BLOOD BY AUTOMATED COUNT: 18.7 % (ref 10–15)
GFR SERPL CREATININE-BSD FRML MDRD: >90 ML/MIN/{1.73_M2}
GLUCOSE SERPL-MCNC: 94 MG/DL (ref 70–99)
HCT VFR BLD AUTO: 37.2 % (ref 40–53)
HGB BLD-MCNC: 11.7 G/DL (ref 13.3–17.7)
INR PPP: 1.04 (ref 0.86–1.14)
INTERPRETATION ECG - MUSE: NORMAL
KCT BLD-ACNC: 294 SEC (ref 75–150)
MCH RBC QN AUTO: 27.7 PG (ref 26.5–33)
MCHC RBC AUTO-ENTMCNC: 31.5 G/DL (ref 31.5–36.5)
MCV RBC AUTO: 88 FL (ref 78–100)
PLATELET # BLD AUTO: 170 10E9/L (ref 150–450)
POTASSIUM SERPL-SCNC: 4.4 MMOL/L (ref 3.4–5.3)
RBC # BLD AUTO: 4.22 10E12/L (ref 4.4–5.9)
SODIUM SERPL-SCNC: 142 MMOL/L (ref 133–144)
WBC # BLD AUTO: 8.5 10E9/L (ref 4–11)

## 2019-02-06 PROCEDURE — 85027 COMPLETE CBC AUTOMATED: CPT

## 2019-02-06 PROCEDURE — 85610 PROTHROMBIN TIME: CPT

## 2019-02-06 PROCEDURE — A9270 NON-COVERED ITEM OR SERVICE: HCPCS | Mod: GY

## 2019-02-06 PROCEDURE — 85730 THROMBOPLASTIN TIME PARTIAL: CPT

## 2019-02-06 PROCEDURE — 25000132 ZZH RX MED GY IP 250 OP 250 PS 637: Mod: GY

## 2019-02-06 PROCEDURE — 36415 COLL VENOUS BLD VENIPUNCTURE: CPT

## 2019-02-06 PROCEDURE — 80048 BASIC METABOLIC PNL TOTAL CA: CPT

## 2019-02-06 RX ADMIN — OXYCODONE HYDROCHLORIDE AND ACETAMINOPHEN 1 TABLET: 10; 325 TABLET ORAL at 00:17

## 2019-02-06 RX ADMIN — NICOTINE 1 PATCH: 21 PATCH, EXTENDED RELEASE TRANSDERMAL at 10:10

## 2019-02-06 RX ADMIN — PREGABALIN 100 MG: 100 CAPSULE ORAL at 00:17

## 2019-02-06 RX ADMIN — ASPIRIN 81 MG: 81 TABLET, COATED ORAL at 08:22

## 2019-02-06 RX ADMIN — OXYCODONE HYDROCHLORIDE AND ACETAMINOPHEN 1 TABLET: 10; 325 TABLET ORAL at 04:14

## 2019-02-06 RX ADMIN — ALBUTEROL SULFATE 2 PUFF: 90 AEROSOL, METERED RESPIRATORY (INHALATION) at 06:29

## 2019-02-06 RX ADMIN — AMLODIPINE BESYLATE 5 MG: 5 TABLET ORAL at 08:22

## 2019-02-06 RX ADMIN — OXYCODONE HYDROCHLORIDE AND ACETAMINOPHEN 1 TABLET: 10; 325 TABLET ORAL at 08:22

## 2019-02-06 RX ADMIN — PREGABALIN 100 MG: 100 CAPSULE ORAL at 08:22

## 2019-02-06 RX ADMIN — LISINOPRIL 20 MG: 20 TABLET ORAL at 08:22

## 2019-02-06 RX ADMIN — ATORVASTATIN CALCIUM 20 MG: 20 TABLET, FILM COATED ORAL at 08:22

## 2019-02-06 RX ADMIN — APIXABAN 5 MG: 5 TABLET, FILM COATED ORAL at 08:22

## 2019-02-06 RX ADMIN — OMEPRAZOLE 40 MG: 20 CAPSULE, DELAYED RELEASE ORAL at 08:22

## 2019-02-06 ASSESSMENT — MIFFLIN-ST. JEOR: SCORE: 1519

## 2019-02-06 NOTE — DISCHARGE SUMMARY
EP Discharge Note          Assessment and Plan:   Migel Stringer is a 68 year old year-old male patient with a history of hypertension, hyperlipidemia, atrial fibrillation, aortic stenosis, mitral valve regurgitation, AAA, left renal mass, history of TIA, chronic fatigue, migraines, RLE neuropathy, chronic low back pain, GERD and insomnia. He was hospitalized in August 2018 for a TIA that was thought to be related to his atrial fibrillation.  He wasn't on anticoagulation at the time.  He was previously told to not take anticoagulation due to a history of GI bleed. The pt was referred for an elective outpatient basis for left atrial appendage closure with a Watchman device.       1. Atrial fibrillation (permanent)   S/P successful left atrial appendage closure with a 30mm Watchman device.  Venotomy closure with a figure of eight suture  Intraoperative MARLENE stable device position with trivial flow around its edges. No pericardial effusion.      2 Severe aortic valve stenosis  MARLENE showed Peak transaortic velocity 3.7 m/s, mean gradient 27.0 mmHg (probably underestimated), velocity ratio 0.25  TAVR planned for 3-4 months per pt    Eliquis 5 mg bid and ASA 81 mg x 45 days then Plavix and asa 81 mg until month 6. ASA indefinitely thereafter.   OK to discharge home this morning.    Danelle Nicholson, CNP        Interval History:   Pt feeling well this morning and eager to go home. Up ambulating in the room and voiding without difficulty. Denies chest pain, sob. Pt is tired which is not new. VSSA, tele afib with CVR         Medications:       amLODIPine  5 mg Oral Daily     apixaban ANTICOAGULANT  5 mg Oral BID     aspirin  81 mg Oral Daily     atorvastatin  20 mg Oral QAM     fentaNYL  50 mcg Transdermal Q72H     fentaNYL   Transdermal Q8H     [START ON 2/8/2019] fentaNYL   Transdermal Q72H     lisinopril  20 mg Oral QAM     nicotine  1 patch Transdermal Q24H     nicotine   Transdermal Q8H     nicotine   Transdermal Q8H      "nicotine   Transdermal Daily     nicotine   Transdermal Daily     omeprazole  40 mg Oral BID     pregabalin  100 mg Oral 4x Daily             Review of Systems: If done, described below  The Review of Systems is negative other than noted in the HPI             Physical Exam:   Blood pressure 131/84, pulse 61, temperature 98.4  F (36.9  C), temperature source Oral, resp. rate 17, height 1.765 m (5' 9.5\"), weight 75.6 kg (166 lb 9.6 oz), SpO2 96 %.        Vital Sign Ranges  Temperature Temp  Av.4  F (36.9  C)  Min: 97.9  F (36.6  C)  Max: 98.6  F (37  C)   Blood pressure Systolic (24hrs), Av , Min:103 , Max:140        Diastolic (24hrs), Av, Min:58, Max:89      Pulse Pulse  Av.9  Min: 61  Max: 146   Respirations Resp  Avg: 15.2  Min: 9  Max: 28   Pulse oximetry SpO2  Av %  Min: 92 %  Max: 100 %         Intake/Output Summary (Last 24 hours) at 2019 0824  Last data filed at 2019 0741  Gross per 24 hour   Intake 2867.5 ml   Output 650 ml   Net 2217.5 ml       Constitutional:   in no apparent distress     Lungs:   symmetric, clear to auscultation     Cardiovascular:   irregularly irregular rhythm, grade III systolic murmur     Abdomen:   normal bowel sounds and non-tender     Extremities and Back:   No edema, figure 8 suture removed without difficulty. No hematoma or bruit noted              Data:     Lab Results   Component Value Date    WBC 8.5 2019    HGB 11.7 (L) 2019    HCT 37.2 (L) 2019     2019     2019    POTASSIUM 4.4 2019    CHLORIDE 112 (H) 2019    CO2 25 2019    BUN 14 2019    CR 0.70 2019    GLC 94 2019    SED 14 2018    AST 14 2018    ALT 21 2018    ALKPHOS 73 2018    BILITOTAL 0.3 2018    INR 1.04 2019          "

## 2019-02-06 NOTE — PROGRESS NOTES
Spoke to patient and wife about discharge instructions post Left Atrial Appendage closure. Patient and wife made aware that all instructions, and phone numbers have been included in AVS, that will be given to pt on discharge by Nurse. Patient and wife reminded that there is no driving for 2 days and no lifting, pushing or pulling of more than 10 pounds for 5 days. Patient and wife reminded to call with any concerns or problems including, chest and or groin pain, groin bleed or swelling, shortness of breath. Pt was doing well and complained of no pain. Pt had been up and voided with no problem. Pt made aware that it is ok to shower, but no soaking in bath, pool or hot tub. Was recommended to keep a clean band-aid on groin site for at least 3 days. Patient f/u appointment will be called to pt by Leslye. Patient and /wife have no further questions at this time. Freddie

## 2019-02-06 NOTE — PLAN OF CARE
Pt. A&O. VSS Tele afib/flutter CVR with RVR with acivity up to 120 but quickly returns to CVR with rest. Nonsymptomatic. Baseline CMS. Neuros intact. Indp. Discharge home with wife. Reviewed/education on care/restrictions, when to seek medical attention, medications, and follow up.

## 2019-02-06 NOTE — PLAN OF CARE
A/O, VSS, O2sats 97% on RA. Tele afib/aflutter c variable conduction, CVR. Given percocet and imitrex for back pain and migraine. SBA, steady. R groin site WDL, no hematoma or bruit, CMS intact ex slightly increased tingling in RLE while sitting, pt states he normally has issues with CMS in this extremity. Continue to monitor.

## 2019-02-06 NOTE — PLAN OF CARE
VSS. Pt A&Ox4, N/T to R groin @ baseline level, pulses intact, R groin site remains soft, no bruising but tender to the touch. Pt up w/ steady gait. No complaints of pain. Pt lung sounds clear, given PRN inhaler x1 for SOB. Pt slept intermittently through out the shift. Complaints of pain to back, R groin and migraine. Given PRN medication as able.

## 2019-02-18 ENCOUNTER — TELEPHONE (OUTPATIENT)
Dept: CARDIOLOGY | Facility: CLINIC | Age: 69
End: 2019-02-18

## 2019-02-18 DIAGNOSIS — I48.91 A-FIB (H): Primary | ICD-10-CM

## 2019-02-18 NOTE — TELEPHONE ENCOUNTER
[facility] Call Center    Phone Message    May a detailed message be left on voicemail: yes    Reason for Call: Other: Pts spouse Ghislaine calling to speak with Leslye Mason about follow up instructions.     Action Taken: Message routed to:  [facility] ([facility]

## 2019-02-18 NOTE — PROGRESS NOTES
Date: 2/18/2019    Time of Call: 1:40 PM     Diagnosis:  AFib     [ TORB ] Ordering provider: Michael Diaz MD  Order: s/p mayra 45 day MARLENE     Order received by: Leslye Calix RN     Follow-up/additional notes:

## 2019-02-19 NOTE — TELEPHONE ENCOUNTER
Voicemail left for Ghislaine to follow up to her phone call. Orders have been placed for MARLENE and office visit for Randall.

## 2019-03-28 ENCOUNTER — CARE COORDINATION (OUTPATIENT)
Dept: CARDIOLOGY | Facility: CLINIC | Age: 69
End: 2019-03-28

## 2019-03-28 NOTE — PROGRESS NOTES
Voicemail left for Randall and Ghislaine in regards to follow up MARLENE and office visit s/o 45 days mayra. I stressed that we need to have the MARLENE in order to know if it is safe to stop Xarelto the doctors need to look to make sure there is no clot or leak around the device.    Will continue to follow up.

## 2019-04-02 ENCOUNTER — HOSPITAL ENCOUNTER (OUTPATIENT)
Dept: CARDIOLOGY | Facility: CLINIC | Age: 69
Discharge: HOME | End: 2019-04-02
Attending: INTERNAL MEDICINE | Admitting: INTERNAL MEDICINE
Payer: MEDICARE

## 2019-04-02 VITALS
RESPIRATION RATE: 16 BRPM | OXYGEN SATURATION: 97 % | HEART RATE: 68 BPM | SYSTOLIC BLOOD PRESSURE: 112 MMHG | DIASTOLIC BLOOD PRESSURE: 80 MMHG

## 2019-04-02 DIAGNOSIS — I48.91 A-FIB (H): ICD-10-CM

## 2019-04-02 PROCEDURE — 99153 MOD SED SAME PHYS/QHP EA: CPT

## 2019-04-02 PROCEDURE — 40000556 ZZH STATISTIC PERIPHERAL IV START W US GUIDANCE

## 2019-04-02 PROCEDURE — 93312 ECHO TRANSESOPHAGEAL: CPT | Mod: 26 | Performed by: INTERNAL MEDICINE

## 2019-04-02 PROCEDURE — 25000128 H RX IP 250 OP 636: Performed by: INTERNAL MEDICINE

## 2019-04-02 PROCEDURE — 76376 3D RENDER W/INTRP POSTPROCES: CPT

## 2019-04-02 PROCEDURE — 93320 DOPPLER ECHO COMPLETE: CPT

## 2019-04-02 PROCEDURE — 25000125 ZZHC RX 250: Performed by: INTERNAL MEDICINE

## 2019-04-02 PROCEDURE — 93320 DOPPLER ECHO COMPLETE: CPT | Mod: 26 | Performed by: INTERNAL MEDICINE

## 2019-04-02 PROCEDURE — 99152 MOD SED SAME PHYS/QHP 5/>YRS: CPT

## 2019-04-02 PROCEDURE — 93325 DOPPLER ECHO COLOR FLOW MAPG: CPT | Mod: 26 | Performed by: INTERNAL MEDICINE

## 2019-04-02 RX ORDER — FLUMAZENIL 0.1 MG/ML
0.2 INJECTION, SOLUTION INTRAVENOUS
Status: DISCONTINUED | OUTPATIENT
Start: 2019-04-02 | End: 2019-04-03 | Stop reason: HOSPADM

## 2019-04-02 RX ORDER — FENTANYL CITRATE 50 UG/ML
25 INJECTION, SOLUTION INTRAMUSCULAR; INTRAVENOUS
Status: DISCONTINUED | OUTPATIENT
Start: 2019-04-02 | End: 2019-04-03 | Stop reason: HOSPADM

## 2019-04-02 RX ORDER — NALOXONE HYDROCHLORIDE 0.4 MG/ML
.1-.4 INJECTION, SOLUTION INTRAMUSCULAR; INTRAVENOUS; SUBCUTANEOUS
Status: DISCONTINUED | OUTPATIENT
Start: 2019-04-02 | End: 2019-04-03 | Stop reason: HOSPADM

## 2019-04-02 RX ORDER — SODIUM CHLORIDE 9 MG/ML
INJECTION, SOLUTION INTRAVENOUS CONTINUOUS PRN
Status: DISCONTINUED | OUTPATIENT
Start: 2019-04-02 | End: 2019-04-03 | Stop reason: HOSPADM

## 2019-04-02 RX ORDER — FENTANYL CITRATE 50 UG/ML
25-50 INJECTION, SOLUTION INTRAMUSCULAR; INTRAVENOUS
Status: COMPLETED | OUTPATIENT
Start: 2019-04-02 | End: 2019-04-02

## 2019-04-02 RX ORDER — LIDOCAINE 40 MG/G
CREAM TOPICAL
Status: DISCONTINUED | OUTPATIENT
Start: 2019-04-02 | End: 2019-04-03 | Stop reason: HOSPADM

## 2019-04-02 RX ADMIN — TOPICAL ANESTHETIC 0.5 ML: 200 SPRAY DENTAL; PERIODONTAL at 11:12

## 2019-04-02 RX ADMIN — FENTANYL CITRATE 50 MCG: 50 INJECTION INTRAMUSCULAR; INTRAVENOUS at 11:47

## 2019-04-02 RX ADMIN — LIDOCAINE HYDROCHLORIDE 30 ML: 20 SOLUTION ORAL; TOPICAL at 11:12

## 2019-04-02 RX ADMIN — MIDAZOLAM 2 MG: 1 INJECTION INTRAMUSCULAR; INTRAVENOUS at 11:45

## 2019-04-02 NOTE — PROGRESS NOTES
Pt arrived in ECHO department  for scheduled MARLENE.   Procedure explained, questions answered and consent signed. Discharge instructions discussed with patient.  Pt's throat sprayed at 1110, therefore pt will not be able to eat or drink until 2 hours after at 1310.  Informed pt of this time and encouraged to start with warm fluids and soft foods.    Pt tolerated procedure well, and was given a total of 50 mcg IV fentanyl and 2 mg IV versed for conscious sedation.  Pt denied throat or chest pain after MARLENE complete.   MARLENE probe 58 used for procedure.  Pt denied C.P or throat pain after procedure and was D/C home after awake and VSS.  Escorted out to front lobby by staff in w/c to meet pt's ride home.

## 2019-04-02 NOTE — H&P
[facility]  Cardiac Echo Lab History and Physical  Migel Stringer    Age: 69 year old    YOB: 1950  MRN# [id number]    Primary care provider: Joel Dorado    CHIEF COMPLAINT: s/p watchman device, needs MARLENE routine monitoring    HISTORY OF PRESENT ILLNESS  Migel Stringer is a 69 year old male who underwent left atrial appendage closure with the watchman device 2/5/19.  He is here for repeat MARLENE imaging 45 days post implantation to evaluate for peridevice leak.  He also has pertinent history of atrial fibrillation, severe aortic stenosis (based on moderate gradients and velocity but severe calcium score) and is being evaluated for TAVR.    He currently feels well without any acute complaints.    ======================================================  ALLERGIES     Allergies   Allergen Reactions     Codeine Hives and Itching       MEDICATIONS    (Not in a hospital admission)     PAST MEDICAL HISTORY  Past Medical History:   Diagnosis Date     AAA (abdominal aortic aneurysm) (H)      Aortic stenosis      Asthma      Atrial fibrillation (H)      Chronic low back pain      Chronic nausea      GERD (gastroesophageal reflux disease)      Heart murmur      Heart rate problem      History of sleep apnea      Hyperlipidemia      Hypertension      Insomnia      Left renal mass      Migraines      Mitral regurgitation      Peripheral neuropathy      Renal mass      Status post gastric bypass for obesity      Ulcer, gastric, acute      Vomiting in adult        PAST SURGICAL HISTORY  Past Surgical History:   Procedure Laterality Date     CHOLECYSTECTOMY       COLONOSCOPY       CV LEFT ATRIAL APPENDAGE CLOSURE N/A 2/5/2019    Procedure: Left Atrial Appendage Closure;  Surgeon: Michael Diaz MD;  Location: [facility] CARDIAC CATH LAB     ESOPHAGOGASTRODUODENOSCOPY       GASTRIC BYPASS  2011     HERNIA REPAIR       SPINE SURGERY      hx of 6 lumbar surgeries and 1 thoracic  surgery        SOCIAL HISTORY  Social History     Socioeconomic History     Marital status:      Spouse name: Not on file     Number of children: 4     Years of education: Not on file     Highest education level: Not on file   Occupational History     Occupation:      Employer: [employer]   Social Needs     Financial resource strain: Not on file     Food insecurity:     Worry: Not on file     Inability: Not on file     Transportation needs:     Medical: Not on file     Non-medical: Not on file   Tobacco Use     Smoking status: Current Every Day Smoker     Packs/day: 1.00     Years: 50.00     Pack years: 50.00     Types: Cigarettes, Cigars     Last attempt to quit: 2016     Years since quitting: 3.2     Smokeless tobacco: Never Used     Tobacco comment: quit cigarettes in 2016.  now smokes a small cigar daily   Substance and Sexual Activity     Alcohol use: No     Drug use: No     Sexual activity: Not Currently     Partners: Female     Birth control/protection: None   Lifestyle     Physical activity:     Days per week: Not on file     Minutes per session: Not on file     Stress: Not on file   Relationships     Social connections:     Talks on phone: Not on file     Gets together: Not on file     Attends Denominational service: Not on file     Active member of club or organization: Not on file     Attends meetings of clubs or organizations: Not on file     Relationship status: Not on file     Intimate partner violence:     Fear of current or ex partner: Not on file     Emotionally abused: Not on file     Physically abused: Not on file     Forced sexual activity: Not on file   Other Topics Concern     Not on file   Social History Narrative     Not on file        FAMILY HISTORY  Family History   Problem Relation Age of Onset     Skin Cancer Mother      Chronic Obstructive Pulmonary Disease Mother      Diabetes Mother      Liver Cancer Father      Breast Cancer Father      Breast Cancer Sister       No Known Problems Brother      Diabetes Maternal Grandmother      Cancer Paternal Grandmother         unspecified cancer     No Known Problems Brother      Cancer Paternal Grandfather         unspecified cancer      ===================================================  REVIEW OF SYSTEMS  Skin: negative  Eyes: negative  Ears/Nose/Throat: negative  Respiratory: No shortness of breath, dyspnea on exertion, cough, or hemoptysis  Cardiovascular: negative  Gastrointestinal: negative  Genitourinary: negative  Musculoskeletal: negative  Neurologic: negative  Psychiatric: negative  Hematologic/Lymphatic/Immunologic: negative  Endocrine: negative  ===================================================  PHYSICAL EXAM  /85   Pulse 97   Resp 16   0 lbs 0 oz  There is no height or weight on file to calculate BMI.  No intake or output data in the 24 hours ending 04/02/19 1113    General: well-appearing, NAD  HEENT: NC/AT, oropharynx clear  Neck: supple, no LAD  Chest: CTA b/l  Heart: irregularly irregular, no m/r/g  Vascular: femoral and DP/PT pulses intact b/l  Abdomen: soft, NT/ND  Extremities: warm and well-perfused w/o cyanosis, clubbing or edema  Neuro: A&Ox3, non-focal  =====================================================  LABORATORY DATA  CMPNo lab results found in last 7 days.  CBCNo lab results found in last 7 days.  INRNo lab results found in last 7 days.   ==================================================    ASSESSMENT AND PLAN  Migel Stringer is a 69 year old male who underwent left atrial appendage closure with the watchman device 2/5/19.  He is here for repeat MARLENE imaging 45 days post implantation to evaluate for peridevice leak.     Plan:   --Proceed with procedure as planned    Patient seen and discussed with Dr. Jaylen Salguero MD PGY5  Cardiology Fellow

## 2019-04-03 ENCOUNTER — CARE COORDINATION (OUTPATIENT)
Dept: CARDIOLOGY | Facility: CLINIC | Age: 69
End: 2019-04-03

## 2019-04-03 DIAGNOSIS — I48.91 A-FIB (H): Primary | ICD-10-CM

## 2019-04-03 RX ORDER — CLOPIDOGREL BISULFATE 75 MG/1
75 TABLET ORAL DAILY
Qty: 90 TABLET | Refills: 1 | Status: SHIPPED | OUTPATIENT
Start: 2019-04-03 | End: 2019-08-31

## 2019-04-03 NOTE — PROGRESS NOTES
---Patient had been scheduled for 45 day s/p Watchman MARLENE but cancelled due not feeling well.    Phone call placed to discuss MARLENE results s/p Watchman.    Interpretation Summary  30 mm Watchman Device present. High velocity peridevice leak present near  mitral valve annulus, measuring up to 3 mm in width.    Date: 4/3/2019    Time of Call: 11:22 AM     Diagnosis:  S/p watchman     [ TORB ] Ordering provider: Michael Diaz MD  Order: 1) Stop Apixaban 2) Start Plavix 75 mg daily by mouth 3) Continue ASA 81 mg daily by mouth.     Order received by: Leslye Calix RN

## 2019-04-29 ENCOUNTER — CARE COORDINATION (OUTPATIENT)
Dept: CARDIOLOGY | Facility: CLINIC | Age: 69
End: 2019-04-29

## 2019-04-29 NOTE — PROGRESS NOTES
Voicemail left in regards to possible TAVR procedure date of 6/12. Contact information left for return phone call.

## 2019-05-14 DIAGNOSIS — I35.0 SEVERE AORTIC STENOSIS: Primary | ICD-10-CM

## 2019-05-14 DIAGNOSIS — R06.02 SOB (SHORTNESS OF BREATH): ICD-10-CM

## 2019-08-13 ENCOUNTER — CARE COORDINATION (OUTPATIENT)
Dept: CARDIOLOGY | Facility: CLINIC | Age: 69
End: 2019-08-13

## 2019-08-13 NOTE — PROGRESS NOTES
Voicemail left for Ghislaine to check in on status of Randall and if they are ready to plan his TAVR procedure. In addition I let her know that we are currently scheduling TAVR's into October.

## 2019-08-16 ENCOUNTER — CARE COORDINATION (OUTPATIENT)
Dept: CARDIOLOGY | Facility: CLINIC | Age: 69
End: 2019-08-16

## 2019-08-16 NOTE — PROGRESS NOTES
Voicemail left to check in with the Bieniek's as to when Randall might been wanting to have his TAVR. I informed that we are currently scheduling patient into October for TAVR. Contact information left [phone].

## 2019-08-29 ENCOUNTER — TELEPHONE (OUTPATIENT)
Dept: CARDIOLOGY | Facility: CLINIC | Age: 69
End: 2019-08-29

## 2019-08-29 NOTE — TELEPHONE ENCOUNTER
Spoke with the patient wife. She wants to schedule the TAVR. I told her I will call her tomorrow with instructions from Leslye GONZALEZ

## 2019-08-31 DIAGNOSIS — I48.91 A-FIB (H): ICD-10-CM

## 2019-09-04 NOTE — TELEPHONE ENCOUNTER
clopidogrel (PLAVIX) 75 MG   Last Written Prescription Date:  4/3/19  Last Fill Quantity: 90,   # refills: 1  Last Office Visit : 7/19/18  Future Office visit:  NONE    Routing refill request to provider for review/approval because:  Active PPI on record unless is Protonix  ABNORMAL HGB   NO PPI

## 2019-09-05 RX ORDER — CLOPIDOGREL BISULFATE 75 MG/1
TABLET ORAL
Qty: 90 TABLET | Refills: 1 | Status: ON HOLD | OUTPATIENT
Start: 2019-09-05 | End: 2019-10-24

## 2019-09-10 DIAGNOSIS — I35.0 SEVERE AORTIC STENOSIS: Primary | ICD-10-CM

## 2019-09-11 ENCOUNTER — TELEPHONE (OUTPATIENT)
Dept: CARDIOLOGY | Facility: CLINIC | Age: 69
End: 2019-09-11

## 2019-09-13 ENCOUNTER — PRE VISIT (OUTPATIENT)
Dept: SURGERY | Facility: CLINIC | Age: 69
End: 2019-09-13

## 2019-09-13 NOTE — TELEPHONE ENCOUNTER
FUTURE VISIT INFORMATION      SURGERY INFORMATION:    Date: 10/23/19    Location:  [facility]    Surgeon:  Rafael Neal    Anesthesia Type:  MAC    RECORDS REQUESTED FROM:       Primary Care Provider: Joel Braga    Pertinent Medical History: Hypertension, chronic atrial fibrillation, AAA    Most recent EKG+ Tracin19    Most recent ECHO: 9/10/19    Most recent Cardiac Stress Test: 18    Most recent PFT's: 18

## 2019-09-25 ENCOUNTER — TELEPHONE (OUTPATIENT)
Facility: CLINIC | Age: 69
End: 2019-09-25

## 2019-09-25 NOTE — TELEPHONE ENCOUNTER
09/25/2019    Pt. Called regarding addition of pharmacy appointment to his PAC appointment scheduled for tomorrow.  Pt's brother answered the phone and stated that Pt. Was not currently home.  Message left for Pt. To call the PAC Clinic back regarding appointment.

## 2019-09-26 ENCOUNTER — OFFICE VISIT (OUTPATIENT)
Dept: SURGERY | Facility: CLINIC | Age: 69
End: 2019-09-26
Payer: COMMERCIAL

## 2019-09-26 ENCOUNTER — ANESTHESIA EVENT (OUTPATIENT)
Dept: SURGERY | Facility: CLINIC | Age: 69
End: 2019-09-26

## 2019-09-26 VITALS
DIASTOLIC BLOOD PRESSURE: 66 MMHG | WEIGHT: 155 LBS | RESPIRATION RATE: 20 BRPM | TEMPERATURE: 97.8 F | OXYGEN SATURATION: 99 % | SYSTOLIC BLOOD PRESSURE: 105 MMHG | HEIGHT: 70 IN | HEART RATE: 86 BPM | BODY MASS INDEX: 22.19 KG/M2

## 2019-09-26 DIAGNOSIS — Z01.818 PREOP EXAMINATION: Primary | ICD-10-CM

## 2019-09-26 DIAGNOSIS — Z01.818 PREOP EXAMINATION: ICD-10-CM

## 2019-09-26 DIAGNOSIS — I35.0 SEVERE AORTIC STENOSIS: ICD-10-CM

## 2019-09-26 PROBLEM — G47.9 DIFFICULTY SLEEPING: Status: ACTIVE | Noted: 2019-09-07

## 2019-09-26 PROBLEM — N28.89 RENAL MASS: Status: ACTIVE | Noted: 2019-09-07

## 2019-09-26 LAB
ALBUMIN SERPL-MCNC: 3.5 G/DL (ref 3.4–5)
ALP SERPL-CCNC: 84 U/L (ref 40–150)
ALT SERPL W P-5'-P-CCNC: 17 U/L (ref 0–70)
ANION GAP SERPL CALCULATED.3IONS-SCNC: 6 MMOL/L (ref 3–14)
APTT PPP: 35 SEC (ref 22–37)
AST SERPL W P-5'-P-CCNC: 11 U/L (ref 0–45)
BILIRUB SERPL-MCNC: 0.3 MG/DL (ref 0.2–1.3)
BUN SERPL-MCNC: 13 MG/DL (ref 7–30)
CALCIUM SERPL-MCNC: 8.2 MG/DL (ref 8.5–10.1)
CHLORIDE SERPL-SCNC: 107 MMOL/L (ref 94–109)
CO2 SERPL-SCNC: 26 MMOL/L (ref 20–32)
CREAT SERPL-MCNC: 0.81 MG/DL (ref 0.66–1.25)
GFR SERPL CREATININE-BSD FRML MDRD: 90 ML/MIN/{1.73_M2}
GLUCOSE SERPL-MCNC: 88 MG/DL (ref 70–99)
INR PPP: 1.07 (ref 0.86–1.14)
POTASSIUM SERPL-SCNC: 4.4 MMOL/L (ref 3.4–5.3)
PROT SERPL-MCNC: 6.6 G/DL (ref 6.8–8.8)
SODIUM SERPL-SCNC: 138 MMOL/L (ref 133–144)

## 2019-09-26 RX ORDER — FERROUS SULFATE 325(65) MG
325 TABLET, DELAYED RELEASE (ENTERIC COATED) ORAL DAILY
COMMUNITY

## 2019-09-26 RX ORDER — TRAZODONE HYDROCHLORIDE 100 MG/1
100 TABLET ORAL AT BEDTIME
COMMUNITY

## 2019-09-26 RX ORDER — PROCHLORPERAZINE MALEATE 10 MG
10 TABLET ORAL EVERY 8 HOURS PRN
COMMUNITY

## 2019-09-26 RX ORDER — AMOXICILLIN 250 MG
1 CAPSULE ORAL DAILY PRN
COMMUNITY

## 2019-09-26 ASSESSMENT — PATIENT HEALTH QUESTIONNAIRE - PHQ9
10. IF YOU CHECKED OFF ANY PROBLEMS, HOW DIFFICULT HAVE THESE PROBLEMS MADE IT FOR YOU TO DO YOUR WORK, TAKE CARE OF THINGS AT HOME, OR GET ALONG WITH OTHER PEOPLE: SOMEWHAT DIFFICULT
SUM OF ALL RESPONSES TO PHQ QUESTIONS 1-9: 11
SUM OF ALL RESPONSES TO PHQ QUESTIONS 1-9: 11

## 2019-09-26 ASSESSMENT — LIFESTYLE VARIABLES: TOBACCO_USE: 1

## 2019-09-26 ASSESSMENT — MIFFLIN-ST. JEOR: SCORE: 1464.33

## 2019-09-26 ASSESSMENT — PAIN SCALES - GENERAL: PAINLEVEL: EXTREME PAIN (8)

## 2019-09-26 ASSESSMENT — ENCOUNTER SYMPTOMS: DYSRHYTHMIAS: 1

## 2019-09-26 NOTE — PROGRESS NOTES
Preoperative Assessment Center medication history for September 26, 2019 is complete.    See Epic admission navigator for prior to admission medications.   Operating room staff will still need to confirm medications and last dose information on day of surgery.     Medication history interview sources:  patient, payor information, patient's own pill bottles.     Changes made to PTA medication list (reason)  Added: trazodone, iron, senna-S  Deleted: aspirin- no longer taking this (stopped by physician d/t bleed). Lisinopril - stopped at ED visit on 9/7 - pt to discuss with cardiologist.   Nicotine Patch. Tizanidine - not taking any longer.   Changed: compazine, tylenol PM.     Additional medication history information (including reliability of information, actions taken by pharmacist):    -- did receive an antibiotic at ED visit 9/7/19 but nothing to go home with.   -- No recent (within 30 days) course of steroids  -- No recent (within 30 days) chronic daily medications stopped   -- Patient declines being on any other prescription or over-the-counter medications    Prior to Admission medications    Medication Sig Last Dose Taking? Auth Provider   albuterol (PROAIR HFA/PROVENTIL HFA/VENTOLIN HFA) 108 (90 Base) MCG/ACT Inhaler Inhale 1-2 puffs into the lungs every 6 hours as needed  Taking Yes Reported, Patient   amLODIPine (NORVASC) 5 MG tablet Take 1 tablet (5 mg) by mouth daily Annual appt due in cardiology Taking Yes Wale Smyth MD   atorvastatin (LIPITOR) 20 MG tablet Take 20 mg by mouth At Bedtime  Taking Yes Reported, Patient   clopidogrel (PLAVIX) 75 MG tablet TAKE ONE TABLET BY MOUTH ONCE DAILY Taking Yes Michael Diaz MD   diphenhydrAMINE-acetaminophen (TYLENOL PM)  MG tablet Take 2 tablets by mouth nightly as needed  Taking Yes Reported, Patient   fentaNYL (DURAGESIC) 50 mcg/hr 72 hr patch Place 1 patch onto the skin every 72 hours  Taking Yes Reported, Patient   ferrous sulfate (FE  TABS) 325 (65 Fe) MG EC tablet Take 325 mg by mouth daily Taking Yes Unknown, Entered By History   omeprazole (PRILOSEC) 40 MG capsule Take 1 capsule (40 mg) by mouth 2 times daily Take 30-60 minutes before a meal. Taking Yes Hilary Rosas MD   oxyCODONE-acetaminophen (PERCOCET)  MG per tablet Take 1 tablet by mouth every 4 hours as needed  Taking Yes Reported, Patient   pregabalin (LYRICA) 100 MG capsule Take 100 mg by mouth 4 times daily Taking Yes Reported, Patient   prochlorperazine (COMPAZINE) 10 MG tablet Take 10 mg by mouth every 8 hours as needed for nausea or vomiting Taking Yes Unknown, Entered By History   senna-docusate (SENOKOT-S/PERICOLACE) 8.6-50 MG tablet Take 1 tablet by mouth daily as needed for constipation Taking Yes Unknown, Entered By History   SUMAtriptan (IMITREX) 100 MG tablet Take 100 mg by mouth 2 times daily as needed  Taking Yes Reported, Patient   traZODone (DESYREL) 100 MG tablet Take 100 mg by mouth At Bedtime Taking Yes Unknown, Entered By History          Medication history completed by: Og Martinez, [facility]

## 2019-09-26 NOTE — PHARMACY - PREOPERATIVE ASSESSMENT CENTER
Pain Medication Clarification Note - PAC Pharmacist  Migel Stringer was seen and interviewed during time of PAC Clinic appointment on September 26, 2019 in preparation for the planned procedure with Dr Neal on 10/23/19 at [facility] for TAVR.     The current plan is for the procedure to be a same day procedure with patient discharging home after the procedure.  The purpose of this note is to verify the patient's home pain regimen.  If the plan changes and the patient is to be admitted to inpatient status postoperatively and primary team would like assistance with managing postoperative pain please consult the inpatient pain management service for specific pain management recommendations (available at [phone] from 8 AM - 3 PM Mon - Fri and available via phone answering service 24/7 at [phone]).     Based on [place] Prescription Monitoring Profile and patient interview:     - OUTPATIENT MEDICATIONS (related to pain management):  -- Long-acting opioid: fentanyl 50 mcg/hr patch - replace 1 patch every 72 hours  -- Short-acting opioid: oxycodone-acetaminophen  mg - take 1 tablet PO q4hr pRN pain (max 6 tabs/day and takes average of 4 tabs/day)  -- Oral adjuvant(s): pregabalin 100 mg PO QID  -- Topicals: nbone  -- Bowel Regimen: senna-S PRN    Average daily oral morphine equivalent (OME): 160 mg of OME/day     If Migle Stringer is admitted the inpatient pain service will follow up on patient after consult is placed and offer further recommendations for pain management.  If immediate assistance is needed please contact the Inpatient Pain Management Service: Call [phone] after hours, weekends and holidays or page [phone] from 8 AM - 3 PM Mon - Fri.    Og Martinez RP  September 26, 2019  1:05 PM

## 2019-09-26 NOTE — H&P
Pre-Operative H & P     CC:  Preoperative exam to assess for increased cardiopulmonary risk while undergoing surgery and anesthesia.    Date of Encounter: 9/26/2019  Primary Care Physician:  Joel Dorado  Associated diagnosis:  Severe aortic stenosis    HPI  Migel Stringer is a 69 year old male who presents for pre-operative H & P in preparation for a TAVR on 10/23/2019 by Dr. Neal at [facility].       Randall Sommers is a 69 year old male with CAD, hypertension, hyperlipidemia, aortic stenosis, mitral valve regurgitation, AAA, left renal mass, history of TIA, chronic fatigue, migraines, RLE neuropathy, chronic low back pain, GERD and insomnia that has aortic stenosis.  Back in June 2018 the patient presented for a pre-op eval at [facility] for a abdominal surgery with Dr. Ramsey.   Examination findings were concerning at that time, thus a stress test and echocardiogram were ordered.  The echocardiogram showed severe aortic stenosis.  Surgery was cancelled and he was referred to cardiology here.  Due to chronic A-fib, it was felt that he shouldn't pursue TAVR without further management of the A-fib first.  He underwent a Watchman procedure on 2/5/2019 in prep for TAVR.  He has now elected to proceed with the TAVR as previously recommended and feels that it is very necessary at this point as his exertional dyspnea seems to keep worsening.        History is obtained from the patient, his spouse and the medical record.     Past Medical History  Past Medical History:   Diagnosis Date     AAA (abdominal aortic aneurysm) (H)      Aortic stenosis      Asthma      Atrial fibrillation (H)      Chronic low back pain      Chronic nausea      GERD (gastroesophageal reflux disease)      Heart murmur      Heart rate problem      History of heparin-induced thrombocytopenia     probably history based on symptoms, repeat titers negative     History of sleep apnea      Hyperlipidemia       Hypertension      Insomnia      Left renal mass      Migraines      Mitral regurgitation      Peripheral neuropathy      Renal mass      Status post gastric bypass for obesity      Ulcer, gastric, acute      Vomiting in adult        Past Surgical History  Past Surgical History:   Procedure Laterality Date     CHOLECYSTECTOMY       COLONOSCOPY       CV LEFT ATRIAL APPENDAGE CLOSURE N/A 2/5/2019    Procedure: Left Atrial Appendage Closure;  Surgeon: Michael Diaz MD;  Location:  [facility]     ESOPHAGOGASTRODUODENOSCOPY       GASTRIC BYPASS  2011     HERNIA REPAIR       SPINE SURGERY      hx of 6 lumbar surgeries and 1 thoracic surgery       Hx of Blood transfusions/reactions: yes, no reactions    Hx of abnormal bleeding or anti-platelet use: plavix    Steroid use in the last year: none    Personal or FH with difficulty with Anesthesia:  none      Prior to Admission Medications  Current Outpatient Medications   Medication Sig Dispense Refill     albuterol (PROAIR HFA/PROVENTIL HFA/VENTOLIN HFA) 108 (90 Base) MCG/ACT Inhaler Inhale 1-2 puffs into the lungs every 6 hours as needed        amLODIPine (NORVASC) 5 MG tablet Take 1 tablet (5 mg) by mouth daily Annual appt due in cardiology 30 tablet 3     atorvastatin (LIPITOR) 20 MG tablet Take 20 mg by mouth At Bedtime        clopidogrel (PLAVIX) 75 MG tablet TAKE ONE TABLET BY MOUTH ONCE DAILY 90 tablet 1     diphenhydrAMINE-acetaminophen (TYLENOL PM)  MG tablet Take 2 tablets by mouth nightly as needed        fentaNYL (DURAGESIC) 50 mcg/hr 72 hr patch Place 1 patch onto the skin every 72 hours        ferrous sulfate (FE TABS) 325 (65 Fe) MG EC tablet Take 325 mg by mouth daily       omeprazole (PRILOSEC) 40 MG capsule Take 1 capsule (40 mg) by mouth 2 times daily Take 30-60 minutes before a meal. 60 capsule 11     oxyCODONE-acetaminophen (PERCOCET)  MG per tablet Take 1 tablet by mouth every 4 hours as needed        pregabalin (LYRICA) 100  MG capsule Take 100 mg by mouth 4 times daily       prochlorperazine (COMPAZINE) 10 MG tablet Take 10 mg by mouth every 8 hours as needed for nausea or vomiting       senna-docusate (SENOKOT-S/PERICOLACE) 8.6-50 MG tablet Take 1 tablet by mouth daily as needed for constipation       SUMAtriptan (IMITREX) 100 MG tablet Take 100 mg by mouth 2 times daily as needed        traZODone (DESYREL) 100 MG tablet Take 100 mg by mouth At Bedtime         Allergies  Allergies   Allergen Reactions     Codeine Hives and Itching     Methadone        Social History  Social History     Socioeconomic History     Marital status:      Spouse name: Not on file     Number of children: 4     Years of education: Not on file     Highest education level: Not on file   Occupational History     Occupation:      Employer: [employer]   Social Needs     Financial resource strain: Not on file     Food insecurity:     Worry: Not on file     Inability: Not on file     Transportation needs:     Medical: Not on file     Non-medical: Not on file   Tobacco Use     Smoking status: Current Every Day Smoker     Packs/day: 1.00     Years: 50.00     Pack years: 50.00     Types: Cigarettes, Cigars     Smokeless tobacco: Never Used     Tobacco comment: quit cigarettes in 2016.  now smokes a small cigar daily   Substance and Sexual Activity     Alcohol use: No     Drug use: No     Sexual activity: Not Currently     Partners: Female     Birth control/protection: None   Lifestyle     Physical activity:     Days per week: Not on file     Minutes per session: Not on file     Stress: Not on file   Relationships     Social connections:     Talks on phone: Not on file     Gets together: Not on file     Attends Gnosticist service: Not on file     Active member of club or organization: Not on file     Attends meetings of clubs or organizations: Not on file     Relationship status: Not on file     Intimate partner violence:     Fear of current or  ex partner: Not on file     Emotionally abused: Not on file     Physically abused: Not on file     Forced sexual activity: Not on file   Other Topics Concern     Not on file   Social History Narrative     Not on file       Family History  Family History   Problem Relation Age of Onset     Skin Cancer Mother      Chronic Obstructive Pulmonary Disease Mother      Diabetes Mother      Liver Cancer Father      Breast Cancer Father      Breast Cancer Sister      No Known Problems Brother      Diabetes Maternal Grandmother      Cancer Paternal Grandmother         unspecified cancer     No Known Problems Brother      Cancer Paternal Grandfather         unspecified cancer               ROS/MED HX  The complete review of systems is negative other than noted in the HPI or here.   ENT/Pulmonary:     (+)BHANU risk factors hypertension, daytime somnolence, tobacco use (quit cigarettes in 2016, but still has an occasional cigar), Current use 1 packs/day  , . .    Neurologic:     (+)neuropathy - RLE, migraines, TIA date: 8/2018 features: left arm weakness, now resolved,     Cardiovascular: Comment: AAA    (+) Dyslipidemia, hypertension----. Taking blood thinners Pt has not received instructions: . . LUCIANO, . :. dysrhythmias a-fib, valvular problems/murmurs type: AS and MR . pulmonary hypertension,   METS/Exercise Tolerance:  1 - Eating, dressing   Hematologic:     (+) History of Transfusion no previous transfusion reaction -     (-) history of blood clots   Musculoskeletal:   (+)  other musculoskeletal- chronic back pain      GI/Hepatic:     (+) GERD Asymptomatic on medication, Other GI/Hepatic chronic nausea s/p gastric bypass.  admitted 9/7/2019 with GI bleed - colonoscopy pending      Renal/Genitourinary:     (+) Other Renal/ Genitourinary, left renal mass      Endo:     (+) Other Endocrine Disorder chronic fatigue.      Psychiatric:     (+) psychiatric history other (comment) (insomnia)      Infectious Disease:  - neg infectious  "disease ROS       Malignancy:      - no malignancy   Other:    (+) H/O Chronic Pain,H/O chronic opiod use ,                        PHYSICAL EXAM:   Mental Status/Neuro: A/A/O   Airway: Facies: Feasible  Mallampati: II  Mouth/Opening: Full  TM distance: > 6 cm  Neck ROM: Full   Respiratory: Auscultation: CTAB     Resp. Rate: Normal     Resp. Effort: Normal      CV: Rhythm: Irregular  Heart: Murmur (Systolic)  Edema: RLE   Comments: Multiple missing and broken teeth     Dental: Details                Temp: 97.8  F (36.6  C) Temp src: Oral BP: 105/66 Pulse: 86   Resp: 20 SpO2: 99 %         155 lbs 0 oz  5' 9.685\"   Body mass index is 22.44 kg/m .       Physical Exam  Constitutional: Awake, alert, cooperative, no apparent distress, and appears older than stated age.  Eyes: Pupils equal, round and reactive to light, extra ocular muscles intact, sclera clear, conjunctiva normal.  HENT: Normocephalic, oral pharynx with moist mucus membranes, poor dentition. No goiter appreciated.   Respiratory: Clear to auscultation bilaterally, no crackles or wheezing.  Cardiovascular: Regular rate and rhythm, normal S1 and S2, and 3/6 systolic murmur noted.  Carotids +2, no bruits. RLE edema. Palpable pulses to radial  DP and PT arteries.   GI: Normal bowel sounds, soft, non-distended, non-tender, no masses palpated, no hepatosplenomegaly.   Lymph/Hematologic: No cervical lymphadenopathy and no supraclavicular lymphadenopathy.  Genitourinary:  deferred  Skin: Warm and dry.    Musculoskeletal: Full ROM of neck. There is no redness, warmth, or swelling of the exposed joints. Gross motor strength is normal.    Neurologic: Awake, alert, oriented to name, place and time. Cranial nerves II-XII are grossly intact. Gait is normal.   Neuropsychiatric: Calm, cooperative. Normal affect.     Labs: (personally reviewed)  Component      Latest Ref Rng & Units 9/26/2019   Sodium      133 - 144 mmol/L 138   Potassium      3.4 - 5.3 mmol/L 4.4 "   Chloride      94 - 109 mmol/L 107   Carbon Dioxide      20 - 32 mmol/L 26   Anion Gap      3 - 14 mmol/L 6   Glucose      70 - 99 mg/dL 88   Urea Nitrogen      7 - 30 mg/dL 13   Creatinine      0.66 - 1.25 mg/dL 0.81   GFR Estimate      >60 mL/min/1.73:m2 90   GFR Estimate If Black      >60 mL/min/1.73:m2 >90   Calcium      8.5 - 10.1 mg/dL 8.2 (L)   Bilirubin Total      0.2 - 1.3 mg/dL 0.3   Albumin      3.4 - 5.0 g/dL 3.5   Protein Total      6.8 - 8.8 g/dL 6.6 (L)   Alkaline Phosphatase      40 - 150 U/L 84   ALT      0 - 70 U/L 17   AST      0 - 45 U/L 11   PTT      22 - 37 sec 35   INR      0.86 - 1.14 1.07         [id number]  Personally reviewed but formal cardiology read pending: atrial fibrillation    MARLENE  2019  Interpretation Summary  30 mm Watchman Device present. High velocity peridevice leak present near  mitral valve annulus, measuring up to 3 mm in width.     Global and regional left ventricular function is normal with an EF of 60-65%.  The atrial septum is intact as assessed by color Doppler .  Severe aortic stenosis is present. The aortic valve area is 0.9 cm^2, by  planimetry.  Mild atherothrombi of the aorta are present in the descending thoracic aorta.  Compared to prior MARLENE dated 19 during watchman inplantation, a larger  peridevice leak is present.    Cardiac Cath  2018  CORONARY ANGIOGRAM:   Proximal RCA stenosis of 50%, reduced CO, elevated bivetricular filling pressures      PFT  2018    FVC, FEV1, FEV1/FVC normal. Diffusion is reduced, however, not corrected for patient's hemoglobin. TLC normal.  IMPRESSION:  Mild Diffusion Defect.  Normal spirometry and lung volumes.          Outside records reviewed from: Care Everywhere        ASSESSMENT and PLAN  Randall Sommers is a 69 year old male scheduled for a TAVR on 10/23/2019 by Dr. Neal in treatment of aortic stenosis.  [facility] referral for risk assessment and optimization for anesthesia with comorbid conditions of:  CAD,  hypertension, hyperlipidemia, aortic stenosis, mitral valve regurgitation, AAA, left renal mass, history of TIA, chronic fatigue, migraines, RLE neuropathy, chronic low back pain, GERD and insomnia.      Pre-operative considerations:  1.  Cardiac:  Functional status- METS 1.  The patient is minimally physically active due to chronic fatigue and generalized weakness.  He reports that his exertional dyspnea is worsening.  He has a watchman procedure done in 2/2019 for atrial fibrillation.  That was recommended before he proceeded with TAVR.  TIA He has chronic BLE peripheral edema, exertional dyspnea and chronic fatigue.  AAA is noted to be stable at 4.5cm.  His last echocardiogram showed an EF of 60-65%, severe aortic stenosis, mild atherothrombii of the aorta and peridevice leak. His cardiac cath in July 2018 showed Proximal RCA stenosis of 50%, reduced CO, elevated bivetricular filling pressures.      2.  Pulm:  Airway feasible.  BHANU risk: intermediate.  He reports that he was previously diagnosed with sleep apnea and had a CPAP, but since losing over 100lbs since his gastric bypass in 2011 he stopped using the CPAP and no longer has symptoms of sleep apnea.  He has not been rechecked with a follow up sleep study though. He smoked cigarettes for approximately 50 years until 2016, but does still smoke a small cigar daily.   PFT done here previously showed as normal.         3.  GI:  Risk of PONV score = 1.  If > 2, anti-emetic intervention recommended.  No noted history of PONV, but he does have chronic N/V with history of gastric bypass.  He reports that the nausea is well controlled with medications.  He is following with Dr. Ramsey for that and gastric bypass reversal will be re-considered in the future.  Any indicated PONV prevention measures as per anesthesia DOS.  GERD is currently well controlled with omeprazole.  He was admitted at an outside hospital on 9/7/2019 for a GI bleed and anemia.  It was thought  that it was likely related to an acute colitis based on CT results.  No procedures were done there and no blood was transfused.  He was told to scheduled an outpatient colonoscopy.  Patient was called earlier today to determine if he had done this.  He hadn't scheduled the colonoscopy yet, so he was encouraged to call to schedule prior to coming in for this appt today.  He notes now that he has the colonoscopy scheduled for next week (10/3/2019).      4. Neuro:  He has RLE peripheral neuropathy secondary to a traumatic spinal injury in 2004 and subsequent spinal surgeries.  He managed intermittent migraines with prn imitrex.  Hold imitrex for 24 hours prior to surgery.  S/p TIA in August 2018 with no residual.       5. Musculoskeletal/ pain:  He is on percocet and fentanyl patch for chronic back pain.  Please see pharmD note for pain management recommendations.  Consider cautious positioning.       6. Renal: He has a chronic left renal mass noted on imaging to be suspicious for RCC.  Discussed this with him previously and encouraged him to get a referral to urology from his primary care provider locally for evaluation, but he hasn't had any further evaluation as his primary care provider has told him it is only a cyst (no biopsy ever done).  I have again encouraged him to consider further evaluation.          7. Heme:  His hgb was quite low during his above noted admission.  Most recent recheck was 9.4.  Will repeat on DOS.  T&S has been ordered.  He is currently on plavix.  Aspirin was stopped after his admission for GI bleed. Pre-op plavix instructions from cardiology pending.  There is concern that he may have had HIT in the past, but official diagnosis was never made.  HIT testing today is negative.  VTE risk: 1.8%      Patient is not yet optimized or an acceptable candidate for the proposed procedure.  His colonoscopy must be completed prior to TAVR.    Approval to proceed is pending colonoscopy results.       Patient discussed with Dr. Jules.        **Addendum (10/4/2019): Colonoscopy completed at an outside facility yesterday.  Procedure note reviewed via Care Everywhere.  There was no active bleeding and no concerning findings.  Okay to now proceed as planned.**       Jyothi Whitmore DNP, RN, APRN  Preoperative Assessment Center  [facility]  Phone: [phone]  Fax: [phone]

## 2019-09-26 NOTE — ANESTHESIA PREPROCEDURE EVALUATION
Anesthesia Pre-Procedure Evaluation    Patient: Migel Stringer   MRN:     [id number] Gender:   male   Age:    69 year old :      1950        Preoperative Diagnosis: * No surgery found *        Past Medical History:   Diagnosis Date     AAA (abdominal aortic aneurysm) (H)      Aortic stenosis      Asthma      Atrial fibrillation (H)      Chronic low back pain      Chronic nausea      GERD (gastroesophageal reflux disease)      Heart murmur      Heart rate problem      History of heparin-induced thrombocytopenia     probably history based on symptoms, repeat titers negative     History of sleep apnea      Hyperlipidemia      Hypertension      Insomnia      Left renal mass      Migraines      Mitral regurgitation      Peripheral neuropathy      Renal mass      Status post gastric bypass for obesity      Ulcer, gastric, acute      Vomiting in adult       Past Surgical History:   Procedure Laterality Date     CHOLECYSTECTOMY       COLONOSCOPY       CV LEFT ATRIAL APPENDAGE CLOSURE N/A 2019    Procedure: Left Atrial Appendage Closure;  Surgeon: Michael Diaz MD;  Location:  [facility]     ESOPHAGOGASTRODUODENOSCOPY       GASTRIC BYPASS       HERNIA REPAIR       SPINE SURGERY      hx of 6 lumbar surgeries and 1 thoracic surgery          Anesthesia Evaluation     . Pt has had prior anesthetic. Type: General    No history of anesthetic complications          ROS/MED HX    ENT/Pulmonary:     (+)BHANU risk factors hypertension, daytime somnolence, tobacco use (quit cigarettes in , but still has an occasional cigar), Current use 1 packs/day  , . .    Neurologic:     (+)neuropathy - RLE, migraines, TIA date: 2018 features: left arm weakness, now resolved,     Cardiovascular: Comment: AAA    (+) Dyslipidemia, hypertension----. Taking blood thinners Pt has not received instructions: . . LUCIANO, . :. dysrhythmias a-fib, valvular problems/murmurs type: AS and MR . pulmonary hypertension,  Previous cardiac testing Echodate:4/2019results:Interpretation Summary  30 mm Watchman Device present. High velocity peridevice leak present near  mitral valve annulus, measuring up to 3 mm in width.     Global and regional left ventricular function is normal with an EF of 60-65%.  The atrial septum is intact as assessed by color Doppler .  Severe aortic stenosis is present. The aortic valve area is 0.9 cm^2, by  planimetry.  Mild atherothrombi of the aorta are present in the descending thoracic aorta.  Compared to prior MARLENE dated 2/5/19 during watchman inplantation, a larger  peridevice leak is present.date: results:ECG reviewed date:9/26/2019 results:Atrial fibrillationCath date: 7/2018 results:Proximal RCA stenosis of 50%, reduced CO, elevated bivetricular filling pressures          METS/Exercise Tolerance:  1 - Eating, dressing   Hematologic:     (+) History of Transfusion no previous transfusion reaction -     (-) history of blood clots   Musculoskeletal:   (+)  other musculoskeletal- chronic back pain      GI/Hepatic:     (+) GERD Asymptomatic on medication, Other GI/Hepatic chronic nausea s/p gastric bypass.  admitted 9/7/2019 with GI bleed - colonoscopy pending      Renal/Genitourinary:     (+) Other Renal/ Genitourinary, left renal mass      Endo:     (+) Other Endocrine Disorder chronic fatigue.      Psychiatric:     (+) psychiatric history other (comment) (insomnia)      Infectious Disease:  - neg infectious disease ROS       Malignancy:      - no malignancy   Other:    (+) H/O Chronic Pain,H/O chronic opiod use ,                        PHYSICAL EXAM:   Mental Status/Neuro: A/A/O   Airway: Facies: Feasible  Mallampati: II  Mouth/Opening: Full  TM distance: > 6 cm  Neck ROM: Full   Respiratory: Auscultation: CTAB     Resp. Rate: Normal     Resp. Effort: Normal      CV: Rhythm: Irregular  Heart: Murmur (Systolic)  Edema: RLE   Comments: Multiple missing and broken teeth     Dental: Details         "        LABS:  CBC:   Lab Results   Component Value Date    WBC 8.5 02/06/2019    WBC 7.0 02/05/2019    HGB 11.7 (L) 02/06/2019    HGB 11.5 (L) 02/05/2019    HCT 37.2 (L) 02/06/2019    HCT 36.7 (L) 02/05/2019     02/06/2019     02/05/2019     BMP:   Lab Results   Component Value Date     02/06/2019     02/05/2019    POTASSIUM 4.4 02/06/2019    POTASSIUM 4.6 02/05/2019    CHLORIDE 112 (H) 02/06/2019    CHLORIDE 112 (H) 02/05/2019    CO2 25 02/06/2019    CO2 29 02/05/2019    BUN 14 02/06/2019    BUN 17 02/05/2019    CR 0.70 02/06/2019    CR 0.86 02/05/2019    GLC 94 02/06/2019    GLC 88 02/05/2019     COAGS:   Lab Results   Component Value Date    PTT 36 02/06/2019    INR 1.04 02/06/2019     POC:   Lab Results   Component Value Date    BGM 85 08/02/2018     OTHER:   Lab Results   Component Value Date    A1C 5.4 08/01/2018    SUZY 8.4 (L) 02/06/2019    ALBUMIN 3.4 07/19/2018    PROTTOTAL 6.3 (L) 07/19/2018    ALT 21 07/19/2018    AST 14 07/19/2018    ALKPHOS 73 07/19/2018    BILITOTAL 0.3 07/19/2018    CRP 12.0 (H) 09/11/2018    SED 14 09/11/2018        Preop Vitals    BP Readings from Last 3 Encounters:   09/26/19 105/66   04/02/19 112/80   02/06/19 131/84    Pulse Readings from Last 3 Encounters:   09/26/19 86   04/02/19 68   02/06/19 61      Resp Readings from Last 3 Encounters:   09/26/19 20   04/02/19 16   02/06/19 17    SpO2 Readings from Last 3 Encounters:   09/26/19 99%   04/02/19 97%   02/06/19 96%      Temp Readings from Last 1 Encounters:   09/26/19 97.8  F (36.6  C) (Oral)    Ht Readings from Last 1 Encounters:   09/26/19 1.77 m (5' 9.69\")      Wt Readings from Last 1 Encounters:   09/26/19 70.3 kg (155 lb)    Estimated body mass index is 22.44 kg/m  as calculated from the following:    Height as of this encounter: 1.77 m (5' 9.69\").    Weight as of this encounter: 70.3 kg (155 lb).     LDA:  Peripheral IV 02/05/19 Left Lower forearm (Active)   Number of days: 233       Right Groin " Interventional Procedure Access (Active)   Number of days: 233        JZG FV AN PLAN NO PONV RULE       PAC Discussion and Assessment    ASA Classification: 4  Case is suitable for: Dexter  Anesthetic techniques and relevant risks discussed: GA  Invasive monitoring and risk discussed:   Types:   Possibility and Risk of blood transfusion discussed:   NPO instructions given:   Additional anesthetic preparation and risks discussed:   Needs early admission to pre-op area:   Other:     PAC Resident/NP Anesthesia Assessment:  Randall Sommers is a 69 year old male scheduled for a TAVR on 10/23/2019 by Dr. Neal in treatment of aortic stenosis.  PAC referral for risk assessment and optimization for anesthesia with comorbid conditions of:  CAD, hypertension, hyperlipidemia, aortic stenosis, mitral valve regurgitation, AAA, left renal mass, history of TIA, chronic fatigue, migraines, RLE neuropathy, chronic low back pain, GERD and insomnia.      Pre-operative considerations:  1.  Cardiac:  Functional status- METS 1.  The patient is minimally physically active due to chronic fatigue and generalized weakness.  He reports that his exertional dyspnea is worsening.  He has a watchman procedure done in 2/2019 for atrial fibrillation.  That was recommended before he proceeded with TAVR.  TIA He has chronic BLE peripheral edema, exertional dyspnea and chronic fatigue.  AAA is noted to be stable at 4.5cm.  His last echocardiogram showed an EF of 60-65%, severe aortic stenosis, mild atherothrombii of the aorta and peridevice leak. His cardiac cath in July 2018 showed Proximal RCA stenosis of 50%, reduced CO, elevated bivetricular filling pressures.      2.  Pulm:  Airway feasible.  BHANU risk: intermediate.  He reports that he was previously diagnosed with sleep apnea and had a CPAP, but since losing over 100lbs since his gastric bypass in 2011 he stopped using the CPAP and no longer has symptoms of sleep apnea.  He has not been rechecked  with a follow up sleep study though. He smoked cigarettes for approximately 50 years until 2016, but does still smoke a small cigar daily.   PFT done here previously showed as normal.         3.  GI:  Risk of PONV score = 1.  If > 2, anti-emetic intervention recommended.  No noted history of PONV, but he does have chronic N/V with history of gastric bypass.  He reports that the nausea is well controlled with medications.  He is following with Dr. Ramsey for that and gastric bypass reversal will be re-considered in the future.  Any indicated PONV prevention measures as per anesthesia DOS.  GERD is currently well controlled with omeprazole.  He was admitted at an outside hospital on 9/7/2019 for a GI bleed and anemia.  It was thought that it was likely related to an acute colitis based on CT results.  No procedures were done there and no blood was transfused.  He was told to scheduled an outpatient colonoscopy.  Patient was called earlier today to determine if he had done this.  He hadn't scheduled the colonoscopy yet, so he was encouraged to call to schedule prior to coming in for this appt today.  He notes now that he has the colonoscopy scheduled for next week (10/3/2019).      4. Neuro:  He has RLE peripheral neuropathy secondary to a traumatic spinal injury in 2004 and subsequent spinal surgeries.  He managed intermittent migraines with prn imitrex.  Hold imitrex for 24 hours prior to surgery.  S/p TIA in August 2018 with no residual.       5. Musculoskeletal/ pain:  He is on percocet and fentanyl patch for chronic back pain.  Please see pharmD note for pain management recommendations.  Consider cautious positioning.       6. Renal: He has a chronic left renal mass noted on imaging to be suspicious for RCC.  Discussed this with him previously and encouraged him to get a referral to urology from his primary care provider locally for evaluation, but he hasn't had any further evaluation as his primary care provider  has told him it is only a cyst (no biopsy ever done).  I have again encouraged him to consider further evaluation.          7. Heme:  His hgb was quite low during his above noted admission.  Most recent recheck was 9.4.  Will repeat DOS. T&S has been ordered.  He is currently on plavix.  Aspirin was stopped after his admission for GI bleed. Pre-op plavix instructions from cardiology pending.  There is concern that he may have had HIT in the past, but official diagnosis was never made.  HIT testing today was negative.          VTE risk: 1.8%    Patient is not yet optimized or an acceptable candidate for the proposed procedure.  His colonoscopy must be completed prior to TAVR.    Approval to proceed is pending colonoscopy results.      Patient discussed with Dr. Jules.       **Addendum (10/4/2019): Colonoscopy completed at an outside facility yesterday.  Procedure note reviewed via Care Everywhere.  There was no active bleeding and no concerning findings.  Okay to now proceed as planned.**      **For further details of assessment, testing, and physical exam please see H and P completed on same date.          Jyothi Whitmore DNP, RN, APRN      Reviewed and Signed by PAC Mid-Level Provider/Resident  Mid-Level Provider/Resident: Jyothi Whitmore DNP, RN, APRN  Date: 9/26/2019  Time: 1422    Attending Anesthesiologist Anesthesia Assessment:  69 year old for TAVR in management of severe aortic stenosis. Patient has known CAD, but recent cath showed no significant lesions. Chronic a fib, S/P BONITA closure (Watchman), and not anticoagulated. Dre shows normal LV function with EF 60-65%. 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    Patient/case discussed with JOSEPH/resident; agree with above assessment. No need to see patient. Patient is appropriate for the planned procedure without  further work-up or medical management.    Cary Jules MD        Anesthesiologist:   Date:   Time:   Pass/Fail:   Disposition:     PAC Pharmacist Assessment:        Pharmacist:   Date:   Time:    JUAN CARLOS Hayes CNP

## 2019-09-26 NOTE — PATIENT INSTRUCTIONS
Preparing for Your Surgery      Name:  Migel Stringer   MRN:  [id number]   :  1950   Today's Date:  2019     Arriving for surgery: Pre-Admissions will call you 1-2 business days prior to surgery to confirm surgery date/arrival time/location. ([phone] Mon- Fri 8 am- 5:30 pm)  Surgery date:    Arrival time:      Please come to:         -  Bring your ID and insurance card.    - If you are scheduled to go home the Same Day as surgery you must have a responsible adult as a  and to stay with you overnight the first 24 hours after surgery.     What can I eat or drink? Pre-Admissions will give you exact times.  -  You may have solid food or milk products until 8 hours prior to your surgery.   -  You may have water, apple juice or 7up/Sprite until 2 hours prior to your surgery.     Which medicines can I take?       -  Do not bring your own medications to the hospital, except for inhaler.        -  Follow Cardiology Clinic instructions regarding Ibuprofen. If no instructions given, NO Ibuprofen the day prior to surgery.            -   Plavix instructions per Cardiology.               -  Hold Naproxen (Aleve) 2 days prior to surgery.        -  Hold Sumatriptan (Imitrex) 24 hours prior to surgery.    -  Do NOT take these medications in the morning, the day of surgery:  Ferrous Sulfate    -  Please take these medications the morning of surgery:  Amlodipine, Fentanyl patch, Omeprazole, Pregabalin (Lyrica),   Oxycodone-Acetaminophen (Percocet) if needed  Albuterol inhaler if needed    How do I prepare myself?  -  Take two showers: one the night before surgery; and one the morning of surgery.         Use Scrubcare or Hibiclens to wash from neck down.  You may use your own shampoo and conditioner. No other hair products.   -  Do NOT use lotion, powder, colognes, deodorant, or antiperspirant the day of your surgery.  -  Do NOT wear any jewelry.    Questions or Concerns:  If you have questions or concerns  prior to your surgery, call [phone]. (Mon - Fri   8 am- 5:30 pm)  Questions about surgery, contact your Surgeons office.      AFTER YOUR SURGERY  Breathing exercises   Breathing exercises help you recover faster. Take deep breaths and let the air out slowly. This will:     Help you wake up after surgery.    Help prevent complications like pneumonia.  Preventing complications will help you go home sooner.   We may give you a breathing device (incentive spirometer) to encourage you to breathe deeply.   Nausea and vomiting   You may feel sick to your stomach after surgery; if so, let your nurse know.    Pain control:  After surgery, you may have pain. Our goal is to help you manage your pain. Pain medicine will help you feel comfortable enough to do activities that will help you heal.  These activities may include breathing exercises, walking and physical therapy.   To help your health care team treat your pain we will ask: 1) If you have pain  2) where it is located 3) describe your pain in your words  Methods of pain control include medications given by mouth, vein or by nerve block for some surgeries.  Sequential Compression Device (SCD) or Pneumo Boots:  You may need to wear SCD S on your legs or feet. These are wraps connected to a machine that pumps in air and releases it. The repeated pumping helps prevent blood clots from forming.

## 2019-09-27 LAB
INTERPRETATION ECG - MUSE: NORMAL
PF4 HEPARIN CMPLX AB SER QL: NEGATIVE

## 2019-09-27 ASSESSMENT — PATIENT HEALTH QUESTIONNAIRE - PHQ9: SUM OF ALL RESPONSES TO PHQ QUESTIONS 1-9: 11

## 2019-09-27 NOTE — RESULT ENCOUNTER NOTE
Tyree Lainez,    Your test results are attached. All of your labs are good for surgery.         Jyothi Whitmore DNP, RN, ANP-C

## 2019-10-03 ENCOUNTER — HEALTH MAINTENANCE LETTER (OUTPATIENT)
Age: 69
End: 2019-10-03

## 2019-10-22 ENCOUNTER — ANESTHESIA EVENT (OUTPATIENT)
Dept: SURGERY | Facility: CLINIC | Age: 69
DRG: 267 | End: 2019-10-22
Payer: MEDICARE

## 2019-10-22 DIAGNOSIS — I35.0 SEVERE AORTIC STENOSIS: Primary | ICD-10-CM

## 2019-10-22 RX ORDER — ASPIRIN 81 MG/1
81 TABLET ORAL DAILY
Status: CANCELLED | OUTPATIENT
Start: 2019-10-22

## 2019-10-22 RX ORDER — SODIUM CHLORIDE 9 MG/ML
INJECTION, SOLUTION INTRAVENOUS CONTINUOUS
Status: CANCELLED | OUTPATIENT
Start: 2019-10-22

## 2019-10-22 RX ORDER — LIDOCAINE 40 MG/G
CREAM TOPICAL
Status: CANCELLED | OUTPATIENT
Start: 2019-10-22

## 2019-10-22 RX ORDER — CEFAZOLIN SODIUM 2 G/50ML
2 SOLUTION INTRAVENOUS
Status: CANCELLED | OUTPATIENT
Start: 2019-10-22

## 2019-10-22 ASSESSMENT — ENCOUNTER SYMPTOMS: DYSRHYTHMIAS: 1

## 2019-10-22 ASSESSMENT — LIFESTYLE VARIABLES: TOBACCO_USE: 1

## 2019-10-22 NOTE — ANESTHESIA PREPROCEDURE EVALUATION
Anesthesia Pre-Procedure Evaluation    Patient: Migel Stringer   MRN:     [id number] Gender:   male   Age:    69 year old :      1950        Preoperative Diagnosis: * No surgery found *        Past Medical History:   Diagnosis Date     AAA (abdominal aortic aneurysm) (H)      Aortic stenosis      Asthma      Atrial fibrillation (H)      Chronic low back pain      Chronic nausea      GERD (gastroesophageal reflux disease)      Heart murmur      Heart rate problem      History of heparin-induced thrombocytopenia     probably history based on symptoms, repeat titers negative     History of sleep apnea      Hyperlipidemia      Hypertension      Insomnia      Left renal mass      Migraines      Mitral regurgitation      Peripheral neuropathy      Renal mass      Status post gastric bypass for obesity      Ulcer, gastric, acute      Vomiting in adult       Past Surgical History:   Procedure Laterality Date     CHOLECYSTECTOMY       COLONOSCOPY       CV LEFT ATRIAL APPENDAGE CLOSURE N/A 2019    Procedure: Left Atrial Appendage Closure;  Surgeon: Michael Diaz MD;  Location:  [facility]     ESOPHAGOGASTRODUODENOSCOPY       GASTRIC BYPASS       HERNIA REPAIR       SPINE SURGERY      hx of 6 lumbar surgeries and 1 thoracic surgery          Anesthesia Evaluation     . Pt has had prior anesthetic. Type: General    No history of anesthetic complications          ROS/MED HX    ENT/Pulmonary:     (+)BHANU risk factors hypertension, daytime somnolence, tobacco use (quit cigarettes in , but still has an occasional cigar), Current use 1 packs/day  , . .    Neurologic:     (+)neuropathy - RLE, migraines, TIA date: 2018 features: left arm weakness, now resolved,     Cardiovascular: Comment: AAA    (+) Dyslipidemia, hypertension----. Taking blood thinners Pt has not received instructions: . . LUCIANO, . :. dysrhythmias a-fib, valvular problems/murmurs type: AS and MR . pulmonary hypertension,  Previous cardiac testing Echodate:4/2019results:Interpretation Summary  30 mm Watchman Device present. High velocity peridevice leak present near  mitral valve annulus, measuring up to 3 mm in width.     Global and regional left ventricular function is normal with an EF of 60-65%.  The atrial septum is intact as assessed by color Doppler .  Severe aortic stenosis is present. The aortic valve area is 0.9 cm^2, by  planimetry.  Mild atherothrombi of the aorta are present in the descending thoracic aorta.  Compared to prior MARLENE dated 2/5/19 during watchman inplantation, a larger  peridevice leak is present.date: results:ECG reviewed date:9/26/2019 results:Atrial fibrillationCath date: 7/2018 results:Proximal RCA stenosis of 50%, reduced CO, elevated bivetricular filling pressures          METS/Exercise Tolerance:  1 - Eating, dressing   Hematologic:     (+) History of Transfusion no previous transfusion reaction -     (-) history of blood clots   Musculoskeletal:   (+)  other musculoskeletal- chronic back pain      GI/Hepatic:     (+) GERD Asymptomatic on medication, Other GI/Hepatic chronic nausea s/p gastric bypass.  admitted 9/7/2019 with GI bleed - colonoscopy pending      Renal/Genitourinary:     (+) Other Renal/ Genitourinary, left renal mass      Endo:     (+) Other Endocrine Disorder chronic fatigue.      Psychiatric:     (+) psychiatric history other (comment) (insomnia)      Infectious Disease:  - neg infectious disease ROS       Malignancy:      - no malignancy   Other:    (+) H/O Chronic Pain,H/O chronic opiod use ,                        PHYSICAL EXAM:   Mental Status/Neuro: A/A/O   Airway: Facies: Feasible  Mallampati: II  Mouth/Opening: Full  TM distance: > 6 cm  Neck ROM: Full   Respiratory: Auscultation: CTAB     Resp. Rate: Normal     Resp. Effort: Normal      CV: Rhythm: Irregular  Heart: Murmur (Systolic)  Edema: RLE   Comments: Multiple missing and broken teeth     Dental: Details         "        LABS:  CBC:   Lab Results   Component Value Date    WBC 8.5 02/06/2019    WBC 7.0 02/05/2019    HGB 11.7 (L) 02/06/2019    HGB 11.5 (L) 02/05/2019    HCT 37.2 (L) 02/06/2019    HCT 36.7 (L) 02/05/2019     02/06/2019     02/05/2019     BMP:   Lab Results   Component Value Date     09/26/2019     02/06/2019    POTASSIUM 4.4 09/26/2019    POTASSIUM 4.4 02/06/2019    CHLORIDE 107 09/26/2019    CHLORIDE 112 (H) 02/06/2019    CO2 26 09/26/2019    CO2 25 02/06/2019    BUN 13 09/26/2019    BUN 14 02/06/2019    CR 0.81 09/26/2019    CR 0.70 02/06/2019    GLC 88 09/26/2019    GLC 94 02/06/2019     COAGS:   Lab Results   Component Value Date    PTT 35 09/26/2019    INR 1.07 09/26/2019     POC:   Lab Results   Component Value Date    BGM 85 08/02/2018     OTHER:   Lab Results   Component Value Date    A1C 5.4 08/01/2018    SUZY 8.2 (L) 09/26/2019    ALBUMIN 3.5 09/26/2019    PROTTOTAL 6.6 (L) 09/26/2019    ALT 17 09/26/2019    AST 11 09/26/2019    ALKPHOS 84 09/26/2019    BILITOTAL 0.3 09/26/2019    CRP 12.0 (H) 09/11/2018    SED 14 09/11/2018        Preop Vitals    BP Readings from Last 3 Encounters:   09/26/19 105/66   04/02/19 112/80   02/06/19 131/84    Pulse Readings from Last 3 Encounters:   09/26/19 86   04/02/19 68   02/06/19 61      Resp Readings from Last 3 Encounters:   09/26/19 20   04/02/19 16   02/06/19 17    SpO2 Readings from Last 3 Encounters:   09/26/19 99%   04/02/19 97%   02/06/19 96%      Temp Readings from Last 1 Encounters:   09/26/19 36.6  C (97.8  F) (Oral)    Ht Readings from Last 1 Encounters:   09/26/19 1.77 m (5' 9.69\")      Wt Readings from Last 1 Encounters:   09/26/19 70.3 kg (155 lb)    Estimated body mass index is 22.44 kg/m  as calculated from the following:    Height as of 9/26/19: 1.77 m (5' 9.69\").    Weight as of 9/26/19: 70.3 kg (155 lb).     LDA:  Peripheral IV 02/05/19 Left Lower forearm (Active)   Number of days: 259       Right Groin Interventional " Procedure Access (Active)   Number of days: 259        Assessment:   ASA SCORE: 4    H&P: History and physical reviewed and following examination; no interval change.   Smoking Status:  Active Smoker   NPO Status: NPO Appropriate     Plan:   Anes. Type:  MAC   Pre-Medication: Midazolam   Induction:  IV (Standard)   Airway: Native Airway   Access/Monitoring: PIV; 2nd PIV (Access via cardiology )   Maintenance: N/a     Postop Plan:   Postop Pain: Opioids  Postop Sedation/Airway: Not planned  Disposition: ICU     PONV Management:   Adult Risk Factors:, Postop Opioids   Prevention: Ondansetron                  PAC Discussion and Assessment    ASA Classification: 4  Case is suitable for: [facility]  Anesthetic techniques and relevant risks discussed: GA  Invasive monitoring and risk discussed:   Types:   Possibility and Risk of blood transfusion discussed:   NPO instructions given:   Additional anesthetic preparation and risks discussed:   Needs early admission to pre-op area:   Other:     PAC Resident/NP Anesthesia Assessment:  Randall Sommers is a 69 year old male scheduled for a TAVR on 10/23/2019 by Dr. Neal in treatment of aortic stenosis.  PAC referral for risk assessment and optimization for anesthesia with comorbid conditions of:  CAD, hypertension, hyperlipidemia, aortic stenosis, mitral valve regurgitation, AAA, left renal mass, history of TIA, chronic fatigue, migraines, RLE neuropathy, chronic low back pain, GERD and insomnia.      Pre-operative considerations:  1.  Cardiac:  Functional status- METS 1.  The patient is minimally physically active due to chronic fatigue and generalized weakness.  He reports that his exertional dyspnea is worsening.  He has a watchman procedure done in 2/2019 for atrial fibrillation.  That was recommended before he proceeded with TAVR.  TIA He has chronic BLE peripheral edema, exertional dyspnea and chronic fatigue.  AAA is noted to be stable at 4.5cm.  His last echocardiogram showed  an EF of 60-65%, severe aortic stenosis, mild atherothrombii of the aorta and peridevice leak. His cardiac cath in July 2018 showed Proximal RCA stenosis of 50%, reduced CO, elevated bivetricular filling pressures.      2.  Pulm:  Airway feasible.  BHANU risk: intermediate.  He reports that he was previously diagnosed with sleep apnea and had a CPAP, but since losing over 100lbs since his gastric bypass in 2011 he stopped using the CPAP and no longer has symptoms of sleep apnea.  He has not been rechecked with a follow up sleep study though. He smoked cigarettes for approximately 50 years until 2016, but does still smoke a small cigar daily.   PFT done here previously showed as normal.         3.  GI:  Risk of PONV score = 1.  If > 2, anti-emetic intervention recommended.  No noted history of PONV, but he does have chronic N/V with history of gastric bypass.  He reports that the nausea is well controlled with medications.  He is following with Dr. Ramsey for that and gastric bypass reversal will be re-considered in the future.  Any indicated PONV prevention measures as per anesthesia DOS.  GERD is currently well controlled with omeprazole.  He was admitted at an outside hospital on 9/7/2019 for a GI bleed and anemia.  It was thought that it was likely related to an acute colitis based on CT results.  No procedures were done there and no blood was transfused.  He was told to scheduled an outpatient colonoscopy.  Patient was called earlier today to determine if he had done this.  He hadn't scheduled the colonoscopy yet, so he was encouraged to call to schedule prior to coming in for this appt today.  He notes now that he has the colonoscopy scheduled for next week (10/3/2019).      4. Neuro:  He has RLE peripheral neuropathy secondary to a traumatic spinal injury in 2004 and subsequent spinal surgeries.  He managed intermittent migraines with prn imitrex.  Hold imitrex for 24 hours prior to surgery.  S/p TIA in August  2018 with no residual.       5. Musculoskeletal/ pain:  He is on percocet and fentanyl patch for chronic back pain.  Please see pharmD note for pain management recommendations.  Consider cautious positioning.       6. Renal: He has a chronic left renal mass noted on imaging to be suspicious for RCC.  Discussed this with him previously and encouraged him to get a referral to urology from his primary care provider locally for evaluation, but he hasn't had any further evaluation as his primary care provider has told him it is only a cyst (no biopsy ever done).  I have again encouraged him to consider further evaluation.          7. Heme:  His hgb was quite low during his above noted admission.  Most recent recheck was 9.4.  Will repeat DOS. T&S has been ordered.  He is currently on plavix.  Aspirin was stopped after his admission for GI bleed. Pre-op plavix instructions from cardiology pending.  There is concern that he may have had HIT in the past, but official diagnosis was never made.  HIT testing today was negative.          VTE risk: 1.8%    Patient is not yet optimized or an acceptable candidate for the proposed procedure.  His colonoscopy must be completed prior to TAVR.    Approval to proceed is pending colonoscopy results.      Patient discussed with Dr. Jules.       **Addendum (10/4/2019): Colonoscopy completed at an outside facility yesterday.  Procedure note reviewed via Care Everywhere.  There was no active bleeding and no concerning findings.  Okay to now proceed as planned.**      **For further details of assessment, testing, and physical exam please see H and P completed on same date.          Jyothi Whitmore DNP, RN, APRN      Reviewed and Signed by PAC Mid-Level Provider/Resident  Mid-Level Provider/Resident: Jyothi Whitmore DNP, RN, APRN  Date: 9/26/2019  Time: 1422    Attending Anesthesiologist Anesthesia Assessment:  69 year old for TAVR in management of severe aortic stenosis. Patient has known  CAD, but recent cath showed no significant lesions. Chronic a fib, S/P BONITA closure (Watchman), and not anticoagulated. Dre shows normal LV function with EF 60-65%. 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    Patient/case discussed with JOSEPH/resident; agree with above assessment. No need to see patient. Patient is appropriate for the planned procedure without further work-up or medical management.    Cary Jules MD        Anesthesiologist:   Date:   Time:   Pass/Fail:   Disposition:     PAC Pharmacist Assessment:        Pharmacist:   Date:   Time:    Oskar Griffith MD

## 2019-10-23 ENCOUNTER — HOSPITAL ENCOUNTER (OUTPATIENT)
Dept: CARDIOLOGY | Facility: CLINIC | Age: 69
DRG: 267 | End: 2019-10-23
Attending: INTERNAL MEDICINE | Admitting: INTERNAL MEDICINE
Payer: MEDICARE

## 2019-10-23 ENCOUNTER — SURGERY (OUTPATIENT)
Age: 69
End: 2019-10-23
Payer: COMMERCIAL

## 2019-10-23 ENCOUNTER — ANESTHESIA (OUTPATIENT)
Dept: SURGERY | Facility: CLINIC | Age: 69
DRG: 267 | End: 2019-10-23
Payer: MEDICARE

## 2019-10-23 ENCOUNTER — HOSPITAL ENCOUNTER (INPATIENT)
Facility: CLINIC | Age: 69
LOS: 1 days | Discharge: HOME | DRG: 267 | End: 2019-10-24
Attending: INTERNAL MEDICINE | Admitting: THORACIC SURGERY (CARDIOTHORACIC VASCULAR SURGERY)
Payer: MEDICARE

## 2019-10-23 DIAGNOSIS — Z95.2 S/P TAVR (TRANSCATHETER AORTIC VALVE REPLACEMENT): Primary | ICD-10-CM

## 2019-10-23 DIAGNOSIS — I35.0 SEVERE AORTIC STENOSIS: ICD-10-CM

## 2019-10-23 DIAGNOSIS — I48.91 A-FIB (H): ICD-10-CM

## 2019-10-23 LAB
ABO + RH BLD: NORMAL
ALBUMIN SERPL-MCNC: 2.7 G/DL (ref 3.4–5)
ALBUMIN SERPL-MCNC: 3.3 G/DL (ref 3.4–5)
ALP SERPL-CCNC: 59 U/L (ref 40–150)
ALP SERPL-CCNC: 68 U/L (ref 40–150)
ALT SERPL W P-5'-P-CCNC: 12 U/L (ref 0–70)
ALT SERPL W P-5'-P-CCNC: 16 U/L (ref 0–70)
ANION GAP SERPL CALCULATED.3IONS-SCNC: 7 MMOL/L (ref 3–14)
ANION GAP SERPL CALCULATED.3IONS-SCNC: 8 MMOL/L (ref 3–14)
AST SERPL W P-5'-P-CCNC: 13 U/L (ref 0–45)
AST SERPL W P-5'-P-CCNC: 13 U/L (ref 0–45)
BILIRUB SERPL-MCNC: 0.3 MG/DL (ref 0.2–1.3)
BILIRUB SERPL-MCNC: 0.4 MG/DL (ref 0.2–1.3)
BLD GP AB SCN SERPL QL: NORMAL
BLD GP AB SCN SERPL QL: NORMAL
BLD PROD TYP BPU: NORMAL
BLD UNIT ID BPU: 0
BLD UNIT ID BPU: 0
BLOOD BANK CMNT PATIENT-IMP: NORMAL
BLOOD PRODUCT CODE: NORMAL
BLOOD PRODUCT CODE: NORMAL
BPU ID: NORMAL
BPU ID: NORMAL
BUN SERPL-MCNC: 13 MG/DL (ref 7–30)
BUN SERPL-MCNC: 14 MG/DL (ref 7–30)
CALCIUM SERPL-MCNC: 7.4 MG/DL (ref 8.5–10.1)
CALCIUM SERPL-MCNC: 8.1 MG/DL (ref 8.5–10.1)
CHLORIDE SERPL-SCNC: 108 MMOL/L (ref 94–109)
CHLORIDE SERPL-SCNC: 108 MMOL/L (ref 94–109)
CO2 SERPL-SCNC: 24 MMOL/L (ref 20–32)
CO2 SERPL-SCNC: 24 MMOL/L (ref 20–32)
CREAT SERPL-MCNC: 0.82 MG/DL (ref 0.66–1.25)
CREAT SERPL-MCNC: 0.86 MG/DL (ref 0.66–1.25)
ERYTHROCYTE [DISTWIDTH] IN BLOOD BY AUTOMATED COUNT: 18.4 % (ref 10–15)
ERYTHROCYTE [DISTWIDTH] IN BLOOD BY AUTOMATED COUNT: 18.5 % (ref 10–15)
GFR SERPL CREATININE-BSD FRML MDRD: 88 ML/MIN/{1.73_M2}
GFR SERPL CREATININE-BSD FRML MDRD: 89 ML/MIN/{1.73_M2}
GLUCOSE BLDC GLUCOMTR-MCNC: 83 MG/DL (ref 70–99)
GLUCOSE SERPL-MCNC: 76 MG/DL (ref 70–99)
GLUCOSE SERPL-MCNC: 82 MG/DL (ref 70–99)
HCT VFR BLD AUTO: 25 % (ref 40–53)
HCT VFR BLD AUTO: 30.3 % (ref 40–53)
HGB BLD-MCNC: 7 G/DL (ref 13.3–17.7)
HGB BLD-MCNC: 7.5 G/DL (ref 13.3–17.7)
HGB BLD-MCNC: 8.6 G/DL (ref 13.3–17.7)
INR PPP: 1.09 (ref 0.86–1.14)
INTERPRETATION ECG - MUSE: NORMAL
MAGNESIUM SERPL-MCNC: 1.8 MG/DL (ref 1.6–2.3)
MCH RBC QN AUTO: 25.1 PG (ref 26.5–33)
MCH RBC QN AUTO: 25.3 PG (ref 26.5–33)
MCHC RBC AUTO-ENTMCNC: 28 G/DL (ref 31.5–36.5)
MCHC RBC AUTO-ENTMCNC: 28.4 G/DL (ref 31.5–36.5)
MCV RBC AUTO: 89 FL (ref 78–100)
MCV RBC AUTO: 90 FL (ref 78–100)
NUM BPU REQUESTED: 2
PHOSPHATE SERPL-MCNC: 3.4 MG/DL (ref 2.5–4.5)
PLATELET # BLD AUTO: 117 10E9/L (ref 150–450)
PLATELET # BLD AUTO: 140 10E9/L (ref 150–450)
POTASSIUM SERPL-SCNC: 4 MMOL/L (ref 3.4–5.3)
POTASSIUM SERPL-SCNC: 4.4 MMOL/L (ref 3.4–5.3)
PROT SERPL-MCNC: 5 G/DL (ref 6.8–8.8)
PROT SERPL-MCNC: 6.3 G/DL (ref 6.8–8.8)
RBC # BLD AUTO: 2.79 10E12/L (ref 4.4–5.9)
RBC # BLD AUTO: 3.4 10E12/L (ref 4.4–5.9)
SODIUM SERPL-SCNC: 139 MMOL/L (ref 133–144)
SODIUM SERPL-SCNC: 140 MMOL/L (ref 133–144)
SPECIMEN EXP DATE BLD: NORMAL
SPECIMEN EXP DATE BLD: NORMAL
TRANSFUSION STATUS PATIENT QL: NORMAL
WBC # BLD AUTO: 5.8 10E9/L (ref 4–11)
WBC # BLD AUTO: 7.1 10E9/L (ref 4–11)

## 2019-10-23 PROCEDURE — 25000128 H RX IP 250 OP 636: Performed by: INTERNAL MEDICINE

## 2019-10-23 PROCEDURE — 02RF38Z REPLACEMENT OF AORTIC VALVE WITH ZOOPLASTIC TISSUE, PERCUTANEOUS APPROACH: ICD-10-PCS | Performed by: INTERNAL MEDICINE

## 2019-10-23 PROCEDURE — 93005 ELECTROCARDIOGRAM TRACING: CPT

## 2019-10-23 PROCEDURE — 25000125 ZZHC RX 250: Performed by: INTERNAL MEDICINE

## 2019-10-23 PROCEDURE — 99222 1ST HOSP IP/OBS MODERATE 55: CPT | Performed by: PHYSICIAN ASSISTANT

## 2019-10-23 PROCEDURE — 40000275 ZZH STATISTIC RCP TIME EA 10 MIN

## 2019-10-23 PROCEDURE — 25800030 ZZH RX IP 258 OP 636: Performed by: NURSE ANESTHETIST, CERTIFIED REGISTERED

## 2019-10-23 PROCEDURE — 36000074 ZZH SURGERY LEVEL 6 1ST 30 MIN - UMMC: Performed by: INTERNAL MEDICINE

## 2019-10-23 PROCEDURE — 40000170 ZZH STATISTIC PRE-PROCEDURE ASSESSMENT II: Performed by: INTERNAL MEDICINE

## 2019-10-23 PROCEDURE — 93306 TTE W/DOPPLER COMPLETE: CPT | Mod: 26 | Performed by: INTERNAL MEDICINE

## 2019-10-23 PROCEDURE — C1769 GUIDE WIRE: HCPCS | Performed by: INTERNAL MEDICINE

## 2019-10-23 PROCEDURE — 85610 PROTHROMBIN TIME: CPT | Performed by: INTERNAL MEDICINE

## 2019-10-23 PROCEDURE — 27210794 ZZH OR GENERAL SUPPLY STERILE: Performed by: INTERNAL MEDICINE

## 2019-10-23 PROCEDURE — 25800030 ZZH RX IP 258 OP 636: Performed by: ANESTHESIOLOGY

## 2019-10-23 PROCEDURE — 25000132 ZZH RX MED GY IP 250 OP 250 PS 637: Performed by: NURSE ANESTHETIST, CERTIFIED REGISTERED

## 2019-10-23 PROCEDURE — 27211024 ZZHC OR SUPPLY OTHER OPNP: Performed by: INTERNAL MEDICINE

## 2019-10-23 PROCEDURE — 86900 BLOOD TYPING SEROLOGIC ABO: CPT | Performed by: INTERNAL MEDICINE

## 2019-10-23 PROCEDURE — 37000008 ZZH ANESTHESIA TECHNICAL FEE, 1ST 30 MIN: Performed by: INTERNAL MEDICINE

## 2019-10-23 PROCEDURE — 40000014 ZZH STATISTIC ARTERIAL MONITORING DAILY

## 2019-10-23 PROCEDURE — 80053 COMPREHEN METABOLIC PANEL: CPT | Performed by: INTERNAL MEDICINE

## 2019-10-23 PROCEDURE — 86850 RBC ANTIBODY SCREEN: CPT | Performed by: INTERNAL MEDICINE

## 2019-10-23 PROCEDURE — 25000132 ZZH RX MED GY IP 250 OP 250 PS 637: Performed by: PHYSICIAN ASSISTANT

## 2019-10-23 PROCEDURE — 86901 BLOOD TYPING SEROLOGIC RH(D): CPT | Performed by: INTERNAL MEDICINE

## 2019-10-23 PROCEDURE — 86923 COMPATIBILITY TEST ELECTRIC: CPT | Performed by: INTERNAL MEDICINE

## 2019-10-23 PROCEDURE — 85027 COMPLETE CBC AUTOMATED: CPT | Performed by: INTERNAL MEDICINE

## 2019-10-23 PROCEDURE — 83735 ASSAY OF MAGNESIUM: CPT | Performed by: INTERNAL MEDICINE

## 2019-10-23 PROCEDURE — 25000128 H RX IP 250 OP 636: Performed by: NURSE ANESTHETIST, CERTIFIED REGISTERED

## 2019-10-23 PROCEDURE — 25000132 ZZH RX MED GY IP 250 OP 250 PS 637: Performed by: INTERNAL MEDICINE

## 2019-10-23 PROCEDURE — 00000146 ZZHCL STATISTIC GLUCOSE BY METER IP

## 2019-10-23 PROCEDURE — 71000014 ZZH RECOVERY PHASE 1 LEVEL 2 FIRST HR: Performed by: INTERNAL MEDICINE

## 2019-10-23 PROCEDURE — 25000128 H RX IP 250 OP 636: Performed by: PHYSICIAN ASSISTANT

## 2019-10-23 PROCEDURE — C1730 CATH, EP, 19 OR FEW ELECT: HCPCS | Performed by: INTERNAL MEDICINE

## 2019-10-23 PROCEDURE — 84100 ASSAY OF PHOSPHORUS: CPT | Performed by: INTERNAL MEDICINE

## 2019-10-23 PROCEDURE — 21400000 ZZH R&B CCU UMMC

## 2019-10-23 PROCEDURE — 33361 REPLACE AORTIC VALVE PERQ: CPT | Mod: 62 | Performed by: INTERNAL MEDICINE

## 2019-10-23 PROCEDURE — P9016 RBC LEUKOCYTES REDUCED: HCPCS | Performed by: INTERNAL MEDICINE

## 2019-10-23 PROCEDURE — 85018 HEMOGLOBIN: CPT | Performed by: PHYSICIAN ASSISTANT

## 2019-10-23 PROCEDURE — 27810169 ZZH OR IMPLANT GENERAL: Performed by: INTERNAL MEDICINE

## 2019-10-23 PROCEDURE — 93306 TTE W/DOPPLER COMPLETE: CPT

## 2019-10-23 PROCEDURE — 36415 COLL VENOUS BLD VENIPUNCTURE: CPT | Performed by: PHYSICIAN ASSISTANT

## 2019-10-23 PROCEDURE — 41000018 ZZH PER-PERFUSION 1ST 30 MIN: Performed by: INTERNAL MEDICINE

## 2019-10-23 PROCEDURE — 37000009 ZZH ANESTHESIA TECHNICAL FEE, EACH ADDTL 15 MIN: Performed by: INTERNAL MEDICINE

## 2019-10-23 PROCEDURE — 36000076 ZZH SURGERY LEVEL 6 EA 15 ADDTL MIN - UMMC: Performed by: INTERNAL MEDICINE

## 2019-10-23 PROCEDURE — 25000125 ZZHC RX 250: Performed by: NURSE ANESTHETIST, CERTIFIED REGISTERED

## 2019-10-23 PROCEDURE — C1894 INTRO/SHEATH, NON-LASER: HCPCS | Performed by: INTERNAL MEDICINE

## 2019-10-23 PROCEDURE — 36415 COLL VENOUS BLD VENIPUNCTURE: CPT | Performed by: INTERNAL MEDICINE

## 2019-10-23 PROCEDURE — 93010 ELECTROCARDIOGRAM REPORT: CPT | Mod: 76 | Performed by: INTERNAL MEDICINE

## 2019-10-23 PROCEDURE — C1760 CLOSURE DEV, VASC: HCPCS | Performed by: INTERNAL MEDICINE

## 2019-10-23 DEVICE — VALVE AORTIC EVOLUTR TAVR BIOPROSTH 34MM EVOLUTR-34-US: Type: IMPLANTABLE DEVICE | Site: HEART | Status: FUNCTIONAL

## 2019-10-23 RX ORDER — AMLODIPINE BESYLATE 5 MG/1
5 TABLET ORAL DAILY
Status: DISCONTINUED | OUTPATIENT
Start: 2019-10-24 | End: 2019-10-24 | Stop reason: HOSPADM

## 2019-10-23 RX ORDER — NICOTINE 21 MG/24HR
1 PATCH, TRANSDERMAL 24 HOURS TRANSDERMAL DAILY
Status: DISCONTINUED | OUTPATIENT
Start: 2019-10-23 | End: 2019-10-24 | Stop reason: HOSPADM

## 2019-10-23 RX ORDER — PREGABALIN 50 MG/1
100 CAPSULE ORAL 4 TIMES DAILY
Status: DISCONTINUED | OUTPATIENT
Start: 2019-10-23 | End: 2019-10-24 | Stop reason: HOSPADM

## 2019-10-23 RX ORDER — ATORVASTATIN CALCIUM 20 MG/1
20 TABLET, FILM COATED ORAL EVERY EVENING
Status: DISCONTINUED | OUTPATIENT
Start: 2019-10-23 | End: 2019-10-24 | Stop reason: HOSPADM

## 2019-10-23 RX ORDER — MORPHINE SULFATE 2 MG/ML
1-2 INJECTION, SOLUTION INTRAMUSCULAR; INTRAVENOUS
Status: DISCONTINUED | OUTPATIENT
Start: 2019-10-23 | End: 2019-10-24 | Stop reason: HOSPADM

## 2019-10-23 RX ORDER — DEXMEDETOMIDINE HYDROCHLORIDE 4 UG/ML
0.2-0.7 INJECTION, SOLUTION INTRAVENOUS CONTINUOUS
Status: DISCONTINUED | OUTPATIENT
Start: 2019-10-23 | End: 2019-10-23 | Stop reason: HOSPADM

## 2019-10-23 RX ORDER — ALBUTEROL SULFATE 90 UG/1
1-2 AEROSOL, METERED RESPIRATORY (INHALATION) EVERY 6 HOURS PRN
Status: DISCONTINUED | OUTPATIENT
Start: 2019-10-23 | End: 2019-10-24 | Stop reason: HOSPADM

## 2019-10-23 RX ORDER — METOPROLOL TARTRATE 1 MG/ML
1-2 INJECTION, SOLUTION INTRAVENOUS EVERY 5 MIN PRN
Status: DISCONTINUED | OUTPATIENT
Start: 2019-10-23 | End: 2019-10-23 | Stop reason: HOSPADM

## 2019-10-23 RX ORDER — ONDANSETRON 4 MG/1
4 TABLET, ORALLY DISINTEGRATING ORAL EVERY 30 MIN PRN
Status: DISCONTINUED | OUTPATIENT
Start: 2019-10-23 | End: 2019-10-23 | Stop reason: HOSPADM

## 2019-10-23 RX ORDER — NICARDIPINE HYDROCHLORIDE 2.5 MG/ML
INJECTION INTRAVENOUS
Status: DISCONTINUED | OUTPATIENT
Start: 2019-10-23 | End: 2019-10-23 | Stop reason: HOSPADM

## 2019-10-23 RX ORDER — OXYCODONE AND ACETAMINOPHEN 10; 325 MG/1; MG/1
1 TABLET ORAL EVERY 4 HOURS PRN
Status: DISCONTINUED | OUTPATIENT
Start: 2019-10-23 | End: 2019-10-24 | Stop reason: HOSPADM

## 2019-10-23 RX ORDER — HYDROMORPHONE HYDROCHLORIDE 1 MG/ML
0.3 INJECTION, SOLUTION INTRAMUSCULAR; INTRAVENOUS; SUBCUTANEOUS
Status: DISCONTINUED | OUTPATIENT
Start: 2019-10-23 | End: 2019-10-23

## 2019-10-23 RX ORDER — ONDANSETRON 2 MG/ML
4 INJECTION INTRAMUSCULAR; INTRAVENOUS EVERY 6 HOURS PRN
Status: DISCONTINUED | OUTPATIENT
Start: 2019-10-23 | End: 2019-10-24 | Stop reason: HOSPADM

## 2019-10-23 RX ORDER — NALOXONE HYDROCHLORIDE 0.4 MG/ML
.1-.4 INJECTION, SOLUTION INTRAMUSCULAR; INTRAVENOUS; SUBCUTANEOUS
Status: DISCONTINUED | OUTPATIENT
Start: 2019-10-23 | End: 2019-10-24 | Stop reason: HOSPADM

## 2019-10-23 RX ORDER — PROTAMINE SULFATE 10 MG/ML
INJECTION, SOLUTION INTRAVENOUS PRN
Status: DISCONTINUED | OUTPATIENT
Start: 2019-10-23 | End: 2019-10-23

## 2019-10-23 RX ORDER — ONDANSETRON 4 MG/1
4 TABLET, ORALLY DISINTEGRATING ORAL EVERY 6 HOURS PRN
Status: DISCONTINUED | OUTPATIENT
Start: 2019-10-23 | End: 2019-10-24 | Stop reason: HOSPADM

## 2019-10-23 RX ORDER — SODIUM CHLORIDE, SODIUM LACTATE, POTASSIUM CHLORIDE, CALCIUM CHLORIDE 600; 310; 30; 20 MG/100ML; MG/100ML; MG/100ML; MG/100ML
INJECTION, SOLUTION INTRAVENOUS CONTINUOUS PRN
Status: DISCONTINUED | OUTPATIENT
Start: 2019-10-23 | End: 2019-10-23

## 2019-10-23 RX ORDER — FLUMAZENIL 0.1 MG/ML
0.2 INJECTION, SOLUTION INTRAVENOUS
Status: ACTIVE | OUTPATIENT
Start: 2019-10-23 | End: 2019-10-23

## 2019-10-23 RX ORDER — HYDROMORPHONE HYDROCHLORIDE 1 MG/ML
1 INJECTION, SOLUTION INTRAMUSCULAR; INTRAVENOUS; SUBCUTANEOUS ONCE
Status: DISCONTINUED | OUTPATIENT
Start: 2019-10-23 | End: 2019-10-24 | Stop reason: HOSPADM

## 2019-10-23 RX ORDER — AMLODIPINE BESYLATE 5 MG/1
5 TABLET ORAL AT BEDTIME
Status: DISCONTINUED | OUTPATIENT
Start: 2019-10-23 | End: 2019-10-23

## 2019-10-23 RX ORDER — CEFAZOLIN SODIUM 2 G/100ML
2 INJECTION, SOLUTION INTRAVENOUS
Status: COMPLETED | OUTPATIENT
Start: 2019-10-23 | End: 2019-10-23

## 2019-10-23 RX ORDER — SUMATRIPTAN 100 MG/1
100 TABLET, FILM COATED ORAL ONCE
Status: COMPLETED | OUTPATIENT
Start: 2019-10-23 | End: 2019-10-24

## 2019-10-23 RX ORDER — CLOPIDOGREL BISULFATE 75 MG/1
75 TABLET ORAL DAILY
Status: DISCONTINUED | OUTPATIENT
Start: 2019-10-24 | End: 2019-10-24 | Stop reason: HOSPADM

## 2019-10-23 RX ORDER — IOPAMIDOL 755 MG/ML
INJECTION, SOLUTION INTRAVASCULAR PRN
Status: DISCONTINUED | OUTPATIENT
Start: 2019-10-23 | End: 2019-10-23 | Stop reason: HOSPADM

## 2019-10-23 RX ORDER — SODIUM CHLORIDE 9 MG/ML
INJECTION, SOLUTION INTRAVENOUS CONTINUOUS
Status: DISCONTINUED | OUTPATIENT
Start: 2019-10-23 | End: 2019-10-23 | Stop reason: HOSPADM

## 2019-10-23 RX ORDER — LIDOCAINE 40 MG/G
CREAM TOPICAL
Status: DISCONTINUED | OUTPATIENT
Start: 2019-10-23 | End: 2019-10-23 | Stop reason: HOSPADM

## 2019-10-23 RX ORDER — NITROGLYCERIN 0.4 MG/1
0.4 TABLET SUBLINGUAL EVERY 5 MIN PRN
Status: DISCONTINUED | OUTPATIENT
Start: 2019-10-23 | End: 2019-10-24 | Stop reason: HOSPADM

## 2019-10-23 RX ORDER — SODIUM CHLORIDE, SODIUM LACTATE, POTASSIUM CHLORIDE, CALCIUM CHLORIDE 600; 310; 30; 20 MG/100ML; MG/100ML; MG/100ML; MG/100ML
INJECTION, SOLUTION INTRAVENOUS CONTINUOUS
Status: DISCONTINUED | OUTPATIENT
Start: 2019-10-23 | End: 2019-10-23 | Stop reason: HOSPADM

## 2019-10-23 RX ORDER — FENTANYL CITRATE 50 UG/ML
25-50 INJECTION, SOLUTION INTRAMUSCULAR; INTRAVENOUS
Status: ACTIVE | OUTPATIENT
Start: 2019-10-23 | End: 2019-10-23

## 2019-10-23 RX ORDER — NALOXONE HYDROCHLORIDE 0.4 MG/ML
.1-.4 INJECTION, SOLUTION INTRAMUSCULAR; INTRAVENOUS; SUBCUTANEOUS
Status: DISCONTINUED | OUTPATIENT
Start: 2019-10-23 | End: 2019-10-23

## 2019-10-23 RX ORDER — FENTANYL CITRATE 50 UG/ML
INJECTION, SOLUTION INTRAMUSCULAR; INTRAVENOUS PRN
Status: DISCONTINUED | OUTPATIENT
Start: 2019-10-23 | End: 2019-10-23

## 2019-10-23 RX ORDER — ASPIRIN 81 MG/1
81 TABLET ORAL DAILY
Status: DISCONTINUED | OUTPATIENT
Start: 2019-10-23 | End: 2019-10-23 | Stop reason: HOSPADM

## 2019-10-23 RX ORDER — NOREPINEPHRINE BITARTRATE 0.06 MG/ML
0.03-0.4 INJECTION, SOLUTION INTRAVENOUS CONTINUOUS
Status: DISCONTINUED | OUTPATIENT
Start: 2019-10-23 | End: 2019-10-23 | Stop reason: HOSPADM

## 2019-10-23 RX ORDER — ONDANSETRON 2 MG/ML
4 INJECTION INTRAMUSCULAR; INTRAVENOUS EVERY 30 MIN PRN
Status: DISCONTINUED | OUTPATIENT
Start: 2019-10-23 | End: 2019-10-23 | Stop reason: HOSPADM

## 2019-10-23 RX ORDER — TRAZODONE HYDROCHLORIDE 100 MG/1
100 TABLET ORAL AT BEDTIME
Status: DISCONTINUED | OUTPATIENT
Start: 2019-10-23 | End: 2019-10-24 | Stop reason: HOSPADM

## 2019-10-23 RX ORDER — ASPIRIN 81 MG/1
81 TABLET ORAL DAILY
Status: DISCONTINUED | OUTPATIENT
Start: 2019-10-24 | End: 2019-10-24 | Stop reason: HOSPADM

## 2019-10-23 RX ORDER — HEPARIN SODIUM 1000 [USP'U]/ML
INJECTION, SOLUTION INTRAVENOUS; SUBCUTANEOUS PRN
Status: DISCONTINUED | OUTPATIENT
Start: 2019-10-23 | End: 2019-10-23

## 2019-10-23 RX ORDER — NALOXONE HYDROCHLORIDE 0.4 MG/ML
.2-.4 INJECTION, SOLUTION INTRAMUSCULAR; INTRAVENOUS; SUBCUTANEOUS
Status: ACTIVE | OUTPATIENT
Start: 2019-10-23 | End: 2019-10-23

## 2019-10-23 RX ORDER — ACETAMINOPHEN 325 MG/1
325-650 TABLET ORAL EVERY 4 HOURS PRN
Status: DISCONTINUED | OUTPATIENT
Start: 2019-10-23 | End: 2019-10-24 | Stop reason: HOSPADM

## 2019-10-23 RX ORDER — FENTANYL 50 UG/1
50 PATCH TRANSDERMAL
Status: DISCONTINUED | OUTPATIENT
Start: 2019-10-23 | End: 2019-10-24 | Stop reason: HOSPADM

## 2019-10-23 RX ORDER — ATROPINE SULFATE 0.1 MG/ML
0.5 INJECTION INTRAVENOUS EVERY 5 MIN PRN
Status: ACTIVE | OUTPATIENT
Start: 2019-10-23 | End: 2019-10-23

## 2019-10-23 RX ORDER — HYDRALAZINE HYDROCHLORIDE 20 MG/ML
10 INJECTION INTRAMUSCULAR; INTRAVENOUS
Status: DISCONTINUED | OUTPATIENT
Start: 2019-10-23 | End: 2019-10-24 | Stop reason: HOSPADM

## 2019-10-23 RX ADMIN — ONDANSETRON 4 MG: 4 TABLET, ORALLY DISINTEGRATING ORAL at 18:18

## 2019-10-23 RX ADMIN — PREGABALIN 100 MG: 50 CAPSULE ORAL at 17:38

## 2019-10-23 RX ADMIN — FENTANYL CITRATE 50 MCG: 50 INJECTION, SOLUTION INTRAMUSCULAR; INTRAVENOUS at 08:32

## 2019-10-23 RX ADMIN — MIDAZOLAM 1 MG: 1 INJECTION INTRAMUSCULAR; INTRAVENOUS at 07:26

## 2019-10-23 RX ADMIN — PHENYLEPHRINE HYDROCHLORIDE 50 MCG: 10 INJECTION INTRAVENOUS at 09:49

## 2019-10-23 RX ADMIN — OXYCODONE AND ACETAMINOPHEN 1 TABLET: 10; 325 TABLET ORAL at 15:19

## 2019-10-23 RX ADMIN — LIDOCAINE HYDROCHLORIDE 10 ML: 10 INJECTION, SOLUTION EPIDURAL; INFILTRATION; INTRACAUDAL; PERINEURAL at 08:38

## 2019-10-23 RX ADMIN — OMEPRAZOLE 40 MG: 20 CAPSULE, DELAYED RELEASE ORAL at 17:38

## 2019-10-23 RX ADMIN — HEPARIN SODIUM 10000 UNITS: 1000 INJECTION, SOLUTION INTRAVENOUS; SUBCUTANEOUS at 08:46

## 2019-10-23 RX ADMIN — FENTANYL CITRATE 50 MCG: 50 INJECTION, SOLUTION INTRAMUSCULAR; INTRAVENOUS at 07:26

## 2019-10-23 RX ADMIN — FENTANYL CITRATE 50 MCG: 50 INJECTION, SOLUTION INTRAMUSCULAR; INTRAVENOUS at 08:30

## 2019-10-23 RX ADMIN — POLYETHYLENE GLYCOL 400 AND PROPYLENE GLYCOL 1 DROP: 4; 3 SOLUTION/ DROPS OPHTHALMIC at 08:15

## 2019-10-23 RX ADMIN — ACETAMINOPHEN 650 MG: 325 TABLET, FILM COATED ORAL at 11:24

## 2019-10-23 RX ADMIN — PROTAMINE SULFATE 20 MG: 10 INJECTION, SOLUTION INTRAVENOUS at 09:43

## 2019-10-23 RX ADMIN — IOPAMIDOL 113 ML: 755 INJECTION, SOLUTION INTRAVENOUS at 09:00

## 2019-10-23 RX ADMIN — NICARDIPINE HYDROCHLORIDE 200 MCG: 2.5 INJECTION INTRAVENOUS at 08:51

## 2019-10-23 RX ADMIN — FENTANYL CITRATE 50 MCG: 50 INJECTION, SOLUTION INTRAMUSCULAR; INTRAVENOUS at 08:20

## 2019-10-23 RX ADMIN — PROTAMINE SULFATE 55 MG: 10 INJECTION, SOLUTION INTRAVENOUS at 09:47

## 2019-10-23 RX ADMIN — LIDOCAINE HYDROCHLORIDE 5 ML: 10 INJECTION, SOLUTION EPIDURAL; INFILTRATION; INTRACAUDAL; PERINEURAL at 08:50

## 2019-10-23 RX ADMIN — NICOTINE 1 PATCH: 21 PATCH TRANSDERMAL at 13:29

## 2019-10-23 RX ADMIN — DEXMEDETOMIDINE 0.4 MCG/KG/HR: 100 INJECTION, SOLUTION, CONCENTRATE INTRAVENOUS at 08:00

## 2019-10-23 RX ADMIN — PHENYLEPHRINE HYDROCHLORIDE 100 MCG: 10 INJECTION INTRAVENOUS at 09:34

## 2019-10-23 RX ADMIN — ACETAMINOPHEN 650 MG: 325 TABLET, FILM COATED ORAL at 18:17

## 2019-10-23 RX ADMIN — FENTANYL 1 PATCH: 50 PATCH, EXTENDED RELEASE TRANSDERMAL at 13:37

## 2019-10-23 RX ADMIN — OXYCODONE AND ACETAMINOPHEN 1 TABLET: 10; 325 TABLET ORAL at 23:14

## 2019-10-23 RX ADMIN — TRAZODONE HYDROCHLORIDE 100 MG: 100 TABLET ORAL at 18:18

## 2019-10-23 RX ADMIN — PHENYLEPHRINE HYDROCHLORIDE 50 MCG: 10 INJECTION INTRAVENOUS at 09:15

## 2019-10-23 RX ADMIN — SODIUM CHLORIDE, POTASSIUM CHLORIDE, SODIUM LACTATE AND CALCIUM CHLORIDE: 600; 310; 30; 20 INJECTION, SOLUTION INTRAVENOUS at 07:37

## 2019-10-23 RX ADMIN — ATORVASTATIN CALCIUM 20 MG: 20 TABLET, FILM COATED ORAL at 18:18

## 2019-10-23 RX ADMIN — PREGABALIN 100 MG: 50 CAPSULE ORAL at 23:14

## 2019-10-23 RX ADMIN — HYDROMORPHONE HYDROCHLORIDE 0.3 MG: 1 INJECTION, SOLUTION INTRAMUSCULAR; INTRAVENOUS; SUBCUTANEOUS at 11:24

## 2019-10-23 RX ADMIN — LIDOCAINE HYDROCHLORIDE 10 ML: 10 INJECTION, SOLUTION EPIDURAL; INFILTRATION; INTRACAUDAL; PERINEURAL at 08:32

## 2019-10-23 RX ADMIN — MORPHINE SULFATE 1 MG: 2 INJECTION, SOLUTION INTRAMUSCULAR; INTRAVENOUS at 11:44

## 2019-10-23 RX ADMIN — PHENYLEPHRINE HYDROCHLORIDE 100 MCG: 10 INJECTION INTRAVENOUS at 09:08

## 2019-10-23 RX ADMIN — CEFAZOLIN SODIUM 2 G: 2 INJECTION, SOLUTION INTRAVENOUS at 08:00

## 2019-10-23 RX ADMIN — SODIUM CHLORIDE, POTASSIUM CHLORIDE, SODIUM LACTATE AND CALCIUM CHLORIDE: 600; 310; 30; 20 INJECTION, SOLUTION INTRAVENOUS at 07:23

## 2019-10-23 RX ADMIN — MORPHINE SULFATE 1 MG: 2 INJECTION, SOLUTION INTRAMUSCULAR; INTRAVENOUS at 12:03

## 2019-10-23 RX ADMIN — MIDAZOLAM 1 MG: 1 INJECTION INTRAMUSCULAR; INTRAVENOUS at 09:05

## 2019-10-23 RX ADMIN — MIDAZOLAM 1 MG: 1 INJECTION INTRAMUSCULAR; INTRAVENOUS at 07:35

## 2019-10-23 RX ADMIN — PHENYLEPHRINE HYDROCHLORIDE 100 MCG: 10 INJECTION INTRAVENOUS at 09:02

## 2019-10-23 ASSESSMENT — ACTIVITIES OF DAILY LIVING (ADL)
ADLS_ACUITY_SCORE: 13
FALL_HISTORY_WITHIN_LAST_SIX_MONTHS: NO
SWALLOWING: 0-->SWALLOWS FOODS/LIQUIDS WITHOUT DIFFICULTY
RETIRED_COMMUNICATION: 0-->UNDERSTANDS/COMMUNICATES WITHOUT DIFFICULTY
ADLS_ACUITY_SCORE: 12
RETIRED_EATING: 0-->INDEPENDENT
COGNITION: 0 - NO COGNITION ISSUES REPORTED
DRESS: 0-->INDEPENDENT
BATHING: 0-->INDEPENDENT
TRANSFERRING: 0-->INDEPENDENT
TOILETING: 0-->INDEPENDENT
ADLS_ACUITY_SCORE: 13
AMBULATION: 0-->INDEPENDENT

## 2019-10-23 ASSESSMENT — MIFFLIN-ST. JEOR: SCORE: 1517.25

## 2019-10-23 ASSESSMENT — PAIN DESCRIPTION - DESCRIPTORS
DESCRIPTORS: ACHING
DESCRIPTORS: DISCOMFORT
DESCRIPTORS: ACHING

## 2019-10-23 NOTE — OR NURSING
Dr. Yadav with cardiology paged for pain medications. Patient has baseline chronic back pain and is on bedrest for 6 hours. Will add pain medications. Will also place orders for TR band removal.

## 2019-10-23 NOTE — H&P
CARDIOLOGY-[facility] HISTORY AND PHYSICAL NOTE  Migel Stringer MRN:[id number] YOB: 1950  Date of Admission:10/23/2019    ASSESSMENT AND PLAN:   Randall Sommers is a 69 year old male with severe aortic stenosis that was first noted in pre-operative assessment for a GI procedure CAD, hypertension, hyperlipidemia, mitral valve regurgitation, AAA, left renal mass, history of TIA, atrial fibrillation with history of Watchman device, chronic fatigue, migraines, RLE neuropathy, chronic low back pain, GERD and insomnia. He presents for a TAVR and is now status post 34 mm Medtronic Evolut R valve without significant complication.    Severe Aortic Stenosis   Status post TAVR (34 mm Medtronic Evolut R)   -Bedrest per protocol   -Pain control for acute on chronic exacerbation of back pain   -Neuro checks and groin maintenance per protocol  -TR band orders are in   -Aspirin and plavix   -Considered BB, HR 60 in afib   -TTE, CXR tomorrow  -Cardiac rehab   -Dental prophylaxis in future     Non-obstructing CAD   Patient had coronary angiogram for TAVR workup that showed a 50% lesion in the RCA without intervention.   -Continue aspirin 81 mg daily   -Continue lipitor 20 mg at bedtime   -Lifestyle modifications and risk factor management     Rate controlled atrial fibrillation  History of watchman   -continue telemetry   -History of blood loss anemia on anticoag    Acute on chronic anemia   History of upper GI bleed   History of chronic blood loss anemia   Presenting hemoglobin notably 8.6 then >> 7.0 after TAVR case likely due to dilution and losses during procedure. Will repeat hemoglobin q6 x 2. Type and screened.   -Transfuse if hgb drops below 6.5  -Trend hgb overnight   -Continue protonix    Chronic lower back pain  RLE Neuropathy   Patient has unilateral peripheral neuropathy due to a traumatic spinal injury in 2004 and has had multiple back surgeries. Acute on chronic pain after bedrest. Did consider RP bleeding  with Hemoglobin drop but patient symptoms inconsistent with this, but will trend hemoglobin as above.   -Continue PRN percocet  -Fentanyl patch today   -Intermittent dosing of dilaudid for breakthrough pain on bedrest   -Continue lyrica     History of TIA   August 2018 without residual     Other non-active issues   History of AAA  Left Renal Mass   Chronic Fatigue  Migraines     CODE: Full  FENA:  Cardiac diet   DVT ppx: Mechanical   Disposition: Admitted to [facility]     HISTORY OF PRESENT ILLNESS:  Randall Sommers is a 69 year old male with severe aortic stenosis (0.9cm2, mean gradient 45.6 mmHg) with progressive shortness of breath that was first noted in pre-operative assessment for a GI procedure when patient scheduled for a echo stress. Also with history of CAD, hypertension, hyperlipidemia, mitral valve regurgitation, AAA, left renal mass, history of TIA, atrial fibrillation with history of Watchman device, chronic fatigue, migraines, RLE neuropathy, chronic low back pain, GERD and insomnia. He presents for a TAVR and is now status post 34 mm Medtronic Evolut R valve without significant complication. His aortic stenosis was first recognized when he had a pre-operative workup for possible abdominal surgery due to a stricture.     Randall describes progressive shortness of breath with activity lasting several months in duration. He otherwise denies chest pain, acute shortness of breath, syncope, palpitation. He denies abdominal pain, nausea, or vomiting. He denies edema or orthopnea. He denies headaches or changes in vision following procedure and is neurologically intact.       PAST MEDICAL HISTORY:  Reviewed with patient on 10/23/2019   Past Medical History:   Diagnosis Date     AAA (abdominal aortic aneurysm) (H)      Aortic stenosis      Asthma      Atrial fibrillation (H)      Chronic low back pain      Chronic nausea      GERD (gastroesophageal reflux disease)      Heart murmur      Heart rate problem      History of  heparin-induced thrombocytopenia     probably history based on symptoms, repeat titers negative     History of sleep apnea      Hyperlipidemia      Hypertension      Insomnia      Left renal mass      Migraines      Mitral regurgitation      Peripheral neuropathy      Renal mass      Status post gastric bypass for obesity      Ulcer, gastric, acute      Vomiting in adult        Past Surgical History:   Procedure Laterality Date     CHOLECYSTECTOMY       COLONOSCOPY       CV LEFT ATRIAL APPENDAGE CLOSURE N/A 2/5/2019    Procedure: Left Atrial Appendage Closure;  Surgeon: Michael Diza MD;  Location:  [facility] CARD[facility] CATH LAB     ESOPHAGOGASTRODUODENOSCOPY       GASTRIC BYPASS  2011     HERNIA REPAIR       SPINE SURGERY      hx of 6 lumbar surgeries and 1 thoracic surgery        MEDICATIONS:  PTA Meds  Prior to Admission medications    Medication Sig Last Dose Taking? Auth Provider   albuterol (PROAIR HFA/PROVENTIL HFA/VENTOLIN HFA) 108 (90 Base) MCG/ACT Inhaler Inhale 1-2 puffs into the lungs every 6 hours as needed  10/23/2019 at 0330 Yes Reported, Patient   amLODIPine (NORVASC) 5 MG tablet Take 1 tablet (5 mg) by mouth daily Annual appt due in cardiology 10/23/2019 at 0330 Yes Wale Smyth MD   atorvastatin (LIPITOR) 20 MG tablet Take 20 mg by mouth At Bedtime  10/22/2019 at 1830 Yes Reported, Patient   clopidogrel (PLAVIX) 75 MG tablet TAKE ONE TABLET BY MOUTH ONCE DAILY 10/23/2019 at 0230 Yes Michael Diaz MD   diphenhydrAMINE-acetaminophen (TYLENOL PM)  MG tablet Take 2 tablets by mouth nightly as needed  10/22/2019 at 1830 Yes Reported, Patient   omeprazole (PRILOSEC) 40 MG capsule Take 1 capsule (40 mg) by mouth 2 times daily Take 30-60 minutes before a meal. Past Week at Unknown time Yes Hilary Rosas MD   oxyCODONE-acetaminophen (PERCOCET)  MG per tablet Take 1 tablet by mouth every 4 hours as needed  10/23/2019 at 0530 Yes Reported, Patient   pregabalin (LYRICA)  100 MG capsule Take 100 mg by mouth 4 times daily 10/23/2019 at 0330 Yes Reported, Patient   prochlorperazine (COMPAZINE) 10 MG tablet Take 10 mg by mouth every 8 hours as needed for nausea or vomiting Past Week at Unknown time Yes Unknown, Entered By History   SUMAtriptan (IMITREX) 100 MG tablet Take 100 mg by mouth 2 times daily as needed  Past Month at Unknown time Yes Reported, Patient   traZODone (DESYREL) 100 MG tablet Take 100 mg by mouth At Bedtime 10/22/2019 at 1830 Yes Unknown, Entered By History   fentaNYL (DURAGESIC) 50 mcg/hr 72 hr patch Place 1 patch onto the skin every 72 hours  10/21/2019 at 0800  Reported, Patient   ferrous sulfate (FE TABS) 325 (65 Fe) MG EC tablet Take 325 mg by mouth daily More than a month at Unknown time  Unknown, Entered By History   senna-docusate (SENOKOT-S/PERICOLACE) 8.6-50 MG tablet Take 1 tablet by mouth daily as needed for constipation More than a month at Unknown time  Unknown, Entered By History      Current Meds    amLODIPine  5 mg Oral At Bedtime     [START ON 10/24/2019] aspirin  81 mg Oral Daily     atorvastatin  20 mg Oral QPM     [START ON 10/24/2019] clopidogrel  75 mg Oral Daily     fentaNYL  50 mcg Transdermal Q72H     fentaNYL   Transdermal Q8H     [START ON 10/26/2019] fentaNYL   Transdermal Q72H     HYDROmorphone  1 mg Intravenous Once     nicotine  1 patch Transdermal Daily     nicotine   Transdermal Q8H     [START ON 10/24/2019] nicotine   Transdermal Daily     omeprazole  40 mg Oral BID AC     pregabalin  100 mg Oral 4x Daily     traZODone  100 mg Oral At Bedtime     Infusion Meds      ALLERGIES:    Allergies   Allergen Reactions     Codeine Hives and Itching     Methadone        REVIEW OF SYSTEMS:  A 10 point review of systems was negative except as noted above.    SOCIAL HISTORY:   Social History     Socioeconomic History     Marital status:      Spouse name: Not on file     Number of children: 4     Years of education: Not on file     Highest  education level: Not on file   Occupational History     Occupation:      Employer: [employer]   Social Needs     Financial resource strain: Not on file     Food insecurity:     Worry: Not on file     Inability: Not on file     Transportation needs:     Medical: Not on file     Non-medical: Not on file   Tobacco Use     Smoking status: Current Every Day Smoker     Packs/day: 1.00     Years: 50.00     Pack years: 50.00     Types: Cigarettes, Cigars     Smokeless tobacco: Never Used     Tobacco comment: quit cigarettes in 2016.  now smokes a small cigar daily   Substance and Sexual Activity     Alcohol use: No     Drug use: No     Sexual activity: Not Currently     Partners: Female     Birth control/protection: None   Lifestyle     Physical activity:     Days per week: Not on file     Minutes per session: Not on file     Stress: Not on file   Relationships     Social connections:     Talks on phone: Not on file     Gets together: Not on file     Attends Christian service: Not on file     Active member of club or organization: Not on file     Attends meetings of clubs or organizations: Not on file     Relationship status: Not on file     Intimate partner violence:     Fear of current or ex partner: Not on file     Emotionally abused: Not on file     Physically abused: Not on file     Forced sexual activity: Not on file   Other Topics Concern     Not on file   Social History Narrative     Not on file         FAMILY MEDICAL HISTORY:   Family History   Problem Relation Age of Onset     Skin Cancer Mother      Chronic Obstructive Pulmonary Disease Mother      Diabetes Mother      Liver Cancer Father      Breast Cancer Father      Breast Cancer Sister      No Known Problems Brother      Diabetes Maternal Grandmother      Cancer Paternal Grandmother         unspecified cancer     No Known Problems Brother      Cancer Paternal Grandfather         unspecified cancer         PHYSICAL EXAM:   Temp  Av.8  F  "(36.6  C)  Min: 97.5  F (36.4  C)  Max: 98.4  F (36.9  C)      Pulse  Av  Min: 51  Max: 89 Resp  Av.4  Min: 10  Max: 15  SpO2  Av.9 %  Min: 94 %  Max: 98 %       /75 (BP Location: Left arm)   Pulse 69   Temp 97.7  F (36.5  C) (Oral)   Resp 14   Ht 1.778 m (5' 10\")   Wt 75.1 kg (165 lb 9.1 oz)   SpO2 97%   BMI 23.76 kg/m     Date 10/23/19 07 - 10/24/19 0659   Shift 1707-4837 5502-7647 5953-7066 24 Hour Total   INTAKE   I.V. 600   600   Shift Total(mL/kg) 600(7.99)   600(7.99)   OUTPUT   Blood 5   5   Shift Total(mL/kg) 5(0.07)   5(0.07)   Weight (kg) 75.1 75.1 75.1 75.1      GENERAL APPEARANCE: Alert and NAD, lying flat on bedrest  Head: NC/AT  EYES: PERRL, pupils equal  HENT: Mouth without ulcers or lesions  NECK: Supple, symmetric  Pulmonary: Lungs clear to auscultation on limited anterior  CV: Irregular rhythm, regular rate, soft RICHELLE, JVP flat lying supine  GI: Soft, nontender, normal bowel sounds, no HSM   MS: No evidence of inflammation in joints, no muscle tenderness  SKIN: No rash, warm, dry  NEURO: Mentation intact and speech normal.     LABS:   CMP  Recent Labs   Lab 10/23/19  1047 10/23/19  0546    139   POTASSIUM 4.4 4.0   CHLORIDE 108 108   CO2 24 24   ANIONGAP 8 7   GLC 76 82   BUN 13 14   CR 0.82 0.86   GFRESTIMATED 89 88   GFRESTBLACK >90 >90   SUZY 7.4* 8.1*   MAG 1.8  --    PHOS 3.4  --    PROTTOTAL 5.0* 6.3*   ALBUMIN 2.7* 3.3*   BILITOTAL 0.3 0.4   ALKPHOS 59 68   AST 13 13   ALT 12 16     CBC  Recent Labs   Lab 10/23/19  1047 10/23/19  0546   HGB 7.0* 8.6*   WBC 5.8 7.1   RBC 2.79* 3.40*   HCT 25.0* 30.3*   MCV 90 89   MCH 25.1* 25.3*   MCHC 28.0* 28.4*   RDW 18.4* 18.5*   * 140*     INR  Recent Labs   Lab 10/23/19  0546   INR 1.09     ABGNo lab results found in last 7 days.       KESHA Wilder  CSI  Pager: [phone]    "

## 2019-10-23 NOTE — LETTER
Transition Communication Hand-off for Care Transitions to Next Level of Care Provider    Name: Migel Stringer  : 1950  MRN #: [id number]  Primary Care Provider: Joel Dorado     Primary Clinic: [facility] [address]  [facility] [address]     Reason for Hospitalization:  Severe aortic stenosis [I35.0]  Admit Date/Time: 10/23/2019  5:06 AM  Discharge Date: 10/24/2019  Payor Source: Payor: BCBS / Plan: BCBS PLATINUM BLUE / Product Type: PPO /     Readmission Assessment Measure (MARJORIE) Risk Score/category: Low    Reason for Communication Hand-off Referral: Continuity of care    Discharge Plan: Discharge to home with OP CR       Concern for non-adherence with plan of care:   No  Discharge Needs Assessment:  Needs      Most Recent Value   Equipment Currently Used at Home  none        Follow-up specialty is recommended: Yes    Follow-up plan:    Future Appointments   Date Time Provider Department Center   2019  1:30 PM 1, [facility], [facility]       Any outstanding tests or procedures:        Referrals     Future Labs/Procedures    CARDIAC REHAB REFERRAL     Process Instructions:    Advance to Wellness and Exercise for Life (WEL) Program or to another maintenance exercise program upon completion of phase 2 cardiac rehab.    Weight mgt program is only offered at [facility].    *This therapy referral will be filtered to a centralized scheduling office at [facility] and the patient will receive a call to schedule an appointment at a [facility] location most convenient for them. *        Comments:    If you have not heard from the scheduling office within 2 business days, please call [phone] for all locations, with the exception of [place], please call [phone] and [place], please call [phone].    Please be aware that coverage of these services is subject to the terms and limitations of your health insurance plan.  Call member services at your health  plan with any benefit or coverage questions.            Key Recommendations:  Post hospitalization follow up.  Karina Gonzalez RN BSN  6C Unit Care Coordinator  Phone number: [phone]  Pager: [phone]

## 2019-10-23 NOTE — ANESTHESIA POSTPROCEDURE EVALUATION
Anesthesia POST Procedure Evaluation    Patient: Migel Stringer   MRN:     [id number] Gender:   male   Age:    69 year old :      1950        Preoperative Diagnosis: heart valve condition   Procedure(s):  Transcatheter  Aortic Valve Replacement with Medtronic Evolut R 34mm Valve   Postop Comments: No value filed.       Anesthesia Type:  Not documented  MAC    Reportable Event: NO     PAIN: Uncomplicated   Sign Out status: Comfortable, Well controlled pain     PONV: No PONV   Sign Out status:  No Nausea or Vomiting     Neuro/Psych: Uneventful perioperative course   Sign Out Status: Preoperative baseline; Age appropriate mentation     Airway/Resp.: Uneventful perioperative course   Sign Out Status: Non labored breathing, age appropriate RR; Resp. Status within EXPECTED Parameters     CV: Uneventful perioperative course   Sign Out status: Appropriate BP and perfusion indices; Appropriate HR/Rhythm     Disposition:   Sign Out in:  PACU  Disposition:  Floor  Recovery Course: Uneventful  Follow-Up: Not required           Last Anesthesia Record Vitals:  CRNA VITALS  10/23/2019 0944 - 10/23/2019 1044      10/23/2019             Resp Rate (set):  10          Last PACU Vitals:  Vitals Value Taken Time   BP 98/73 10/23/2019 10:40 AM   Temp     Pulse 68 10/23/2019 10:40 AM   Resp 14 10/23/2019 10:30 AM   SpO2 94 % 10/23/2019 10:43 AM   Temp src     NIBP     Pulse     SpO2     Resp     Temp     Ht Rate     Temp 2     Vitals shown include unvalidated device data.      Electronically Signed By: Mary Mcdonald MD, 2019, 10:44 AM

## 2019-10-23 NOTE — PLAN OF CARE
"BPs improving. A-fib 50s - 60s. Room air sats >96%. Significant \"grabbing\" , chronic pain in lower back; IV dilaudid and morphine did not improve pain whatsoever per pt. Heat packs also did not help. Home percocet dose ordered and fentanyl patch. Home fentanyl patch removed prior to surgery per pt. New fentanyl patch applied to right upper chest. Bedrest until 4pm. Bilateral groin sites C/D/I. TR band removed at 1330; right radial site C/D/I. +2 pulses throughout. Report given to Judith; transferred to  at 1345.           "

## 2019-10-23 NOTE — OP NOTE
OPERATIVE DATE: 10/23/2019    PRE-OPERATIVE DIAGNOSIS:  1) Severe aortic stenosis  Patient Active Problem List   Diagnosis     Status post bariatric surgery     Stroke (H)     Hypertension     Chronic atrial fibrillation     AAA (abdominal aortic aneurysm) (H)     Abdominal aortic aneurysm without rupture (H)     ACP (advance care planning)     Anastomotic stricture of gastrojejunostomy     Anemia due to acute blood loss     BPH without urinary obstruction     Chronic bilateral low back pain without sciatica     Chronic pain     Chronic pain disorder     Controlled substance agreement signed     CRP elevated     Dyslipidemia     Hematemesis     History of migraine     History of Robel-en-Y gastric bypass     Hypokalemia     Insomnia     Intractable cyclical vomiting with nausea     Iron deficiency anemia due to chronic blood loss     Lacunar infarction (H)     Mild opioid use disorder (H)     Moderate aortic stenosis     Moderate protein-calorie malnutrition (H)     Peptic ulcer disease     Postlaminectomy syndrome     H/O gastric bypass     Shift work sleep disorder     Tobacco abuse     Tobacco dependence     UGI bleed     Vitamin D deficiency     A-fib (H)     Atrial fibrillation (H)     Severe aortic stenosis     SOB (shortness of breath)     Difficulty sleeping     Renal mass     POST-OPERATIVE DIAGNOSIS:  1) Severe aortic stenosis  Patient Active Problem List   Diagnosis     Status post bariatric surgery     Stroke (H)     Hypertension     Chronic atrial fibrillation     AAA (abdominal aortic aneurysm) (H)     Abdominal aortic aneurysm without rupture (H)     ACP (advance care planning)     Anastomotic stricture of gastrojejunostomy     Anemia due to acute blood loss     BPH without urinary obstruction     Chronic bilateral low back pain without sciatica     Chronic pain     Chronic pain disorder     Controlled substance agreement signed     CRP elevated     Dyslipidemia     Hematemesis     History of migraine      History of Robel-en-Y gastric bypass     Hypokalemia     Insomnia     Intractable cyclical vomiting with nausea     Iron deficiency anemia due to chronic blood loss     Lacunar infarction (H)     Mild opioid use disorder (H)     Moderate aortic stenosis     Moderate protein-calorie malnutrition (H)     Peptic ulcer disease     Postlaminectomy syndrome     H/O gastric bypass     Shift work sleep disorder     Tobacco abuse     Tobacco dependence     UGI bleed     Vitamin D deficiency     A-fib (H)     Atrial fibrillation (H)     Severe aortic stenosis     SOB (shortness of breath)     Difficulty sleeping     Renal mass     PROCEDURE:  1) Temporary pacemaker insertion  2) Iliofemoral artery angiography  3) Ascending aorta angiography  4) Left heart catheterization  5) Successful transfemoral transcatheter aortic valve replacement with 34mm Medtronic Evolut valve  6) Arteriotomy closure    SURGEON: Theo Ulloa MD    CARDIOLOGIST: Michael Diaz MD; Juan Ramirez MD    ANESTHESIA: GETA    ESTIMATED BLOOD LOSS: 50 cc    INDICATIONS:  Mr. Migel Stringer is a 69 year old year old male admitted with severe aortic stenosis.  We were asked to evaluate for TAVR.  Risks and benefits of the operation were explained to the patient and their family including, but not limited to, bleeding, infection, stroke and even death.  They understood these risks and agreed to proceed electively.    OPERATIVE REPORT:  The patient was transferred to the operating room and positioned supine on the OR table.  General anesthesia was initiated by the anesthesia team.  Endotracheal intubation and central venous access was performed by anesthesia.  The patients abdomen and bilateral lower extremities were clipped, prepped and draped in sterile fashion.  A pre-procedure time-out was performed confirming the correct patient, correct site and correct procedure.  Intravenous heparin was administered.  Arterial access of the right and left  femoral arteries and left common femoral vein was performed by interventional cardiology.  Two Perclose Proglide devices were deployed on the side used for valve deployment.  A temporary transvenous pacemaker was placed by the cardiology team.  A Lunderquist wire was advanced to support the Jones sheath insertion.  A pigtail catheter was advanced to the aortic root and angiogram performed to confirm optimal implant angle.  The pigtail was placed in the non-coronary cusp.  The LV was accessed using an AL-2 catheter over a straight wire.  The pigtail catheter was advanced into the LV.  The pigtail catheter was used to advance a Lunderquist Safari wire into the LV and the pigtail catheter was removed.  The 34mm Medtronic Evolut bioprosthetic valve was positioned across the native aortic valve and deployed using rapid pacing and ventilatory pause.  Transesophageal and transthoracic echocardiography showed trace paravalvular insufficiency.  The deployment system and wires were removed.  The femoral access was made hemostatic.  A final iliofemoral angiogram showed no extravasation or stenosis.    The patient was then transferred from the operating bed to an ICU bed and transferred to the ICU in critical, but stable, condition.    All needle, sponge and instrument counts were correct at the end of the case.    Theo Ulloa  Cardiothoracic Surgery  Pager: [phone]

## 2019-10-23 NOTE — PLAN OF CARE
OT: CX: Pt on bedrest the rest of the day, will check back for functional evaluation tomorrow as able.

## 2019-10-23 NOTE — ANESTHESIA CARE TRANSFER NOTE
Patient: Migel Stringer    Procedure(s):  Transcatheter  Aortic Valve Replacement with Medtronic Evolut R 34mm Valve    Diagnosis: heart valve condition  Diagnosis Additional Information: No value filed.    Anesthesia Type:   MAC     Note:  Airway :Room Air  Patient transferred to:PACU  Comments: Anesthesia Care Transfer Note    Patient: Migel Stringer    Transferred to: PACU    Patient vital signs: stable    Airway: none    Monitors on, VSS, pt. Stable, Report given to PACU EUGENE.     Alfa Kapadia CRNA  10/23/2019 10:20 AM      Handoff Report: Identifed the Patient, Identified the Reponsible Provider, Reviewed the pertinent medical history, Discussed the surgical course, Reviewed Intra-OP anesthesia mangement and issues during anesthesia, Set expectations for post-procedure period and Allowed opportunity for questions and acknowledgement of understanding      Vitals: (Last set prior to Anesthesia Care Transfer)    CRNA VITALS  10/23/2019 0944 - 10/23/2019 1020      10/23/2019             Resp Rate (set):  10                Electronically Signed By: JUAN CARLOS Alfonso CRNA  October 23, 2019  10:20 AM

## 2019-10-23 NOTE — Clinical Note
Potential access sites were evaluated for patency using ultrasound.   The right femoral artery, left femoral artery and left femoral vein was selected. Access was obtained under with Sonosite guidance using a standard 18 guage needle with direct visualization of needle entry.

## 2019-10-24 ENCOUNTER — APPOINTMENT (OUTPATIENT)
Dept: GENERAL RADIOLOGY | Facility: CLINIC | Age: 69
DRG: 267 | End: 2019-10-24
Attending: INTERNAL MEDICINE
Payer: MEDICARE

## 2019-10-24 ENCOUNTER — APPOINTMENT (OUTPATIENT)
Dept: CARDIOLOGY | Facility: CLINIC | Age: 69
DRG: 267 | End: 2019-10-24
Attending: INTERNAL MEDICINE
Payer: MEDICARE

## 2019-10-24 ENCOUNTER — APPOINTMENT (OUTPATIENT)
Dept: OCCUPATIONAL THERAPY | Facility: CLINIC | Age: 69
DRG: 267 | End: 2019-10-24
Attending: INTERNAL MEDICINE
Payer: MEDICARE

## 2019-10-24 VITALS
BODY MASS INDEX: 22.6 KG/M2 | RESPIRATION RATE: 16 BRPM | SYSTOLIC BLOOD PRESSURE: 112 MMHG | OXYGEN SATURATION: 97 % | HEIGHT: 70 IN | TEMPERATURE: 98.5 F | DIASTOLIC BLOOD PRESSURE: 64 MMHG | HEART RATE: 86 BPM | WEIGHT: 157.9 LBS

## 2019-10-24 LAB
ANION GAP SERPL CALCULATED.3IONS-SCNC: 6 MMOL/L (ref 3–14)
BUN SERPL-MCNC: 10 MG/DL (ref 7–30)
CALCIUM SERPL-MCNC: 7.9 MG/DL (ref 8.5–10.1)
CHLORIDE SERPL-SCNC: 108 MMOL/L (ref 94–109)
CO2 SERPL-SCNC: 25 MMOL/L (ref 20–32)
CREAT SERPL-MCNC: 0.67 MG/DL (ref 0.66–1.25)
ERYTHROCYTE [DISTWIDTH] IN BLOOD BY AUTOMATED COUNT: 18.3 % (ref 10–15)
FERRITIN SERPL-MCNC: 8 NG/ML (ref 26–388)
FOLATE SERPL-MCNC: 15.5 NG/ML
GFR SERPL CREATININE-BSD FRML MDRD: >90 ML/MIN/{1.73_M2}
GLUCOSE SERPL-MCNC: 88 MG/DL (ref 70–99)
HCT VFR BLD AUTO: 27.5 % (ref 40–53)
HGB BLD-MCNC: 7.9 G/DL (ref 13.3–17.7)
INTERPRETATION ECG - MUSE: NORMAL
INTERPRETATION ECG - MUSE: NORMAL
IRON SATN MFR SERPL: 4 % (ref 15–46)
IRON SERPL-MCNC: 12 UG/DL (ref 35–180)
MAGNESIUM SERPL-MCNC: 2 MG/DL (ref 1.6–2.3)
MCH RBC QN AUTO: 25.2 PG (ref 26.5–33)
MCHC RBC AUTO-ENTMCNC: 28.7 G/DL (ref 31.5–36.5)
MCV RBC AUTO: 88 FL (ref 78–100)
PHOSPHATE SERPL-MCNC: 3.6 MG/DL (ref 2.5–4.5)
PLATELET # BLD AUTO: 118 10E9/L (ref 150–450)
POTASSIUM SERPL-SCNC: 4.4 MMOL/L (ref 3.4–5.3)
RBC # BLD AUTO: 3.13 10E12/L (ref 4.4–5.9)
SODIUM SERPL-SCNC: 138 MMOL/L (ref 133–144)
TIBC SERPL-MCNC: 319 UG/DL (ref 240–430)
VIT B12 SERPL-MCNC: 778 PG/ML (ref 193–986)
WBC # BLD AUTO: 6.7 10E9/L (ref 4–11)

## 2019-10-24 PROCEDURE — 25000132 ZZH RX MED GY IP 250 OP 250 PS 637: Performed by: INTERNAL MEDICINE

## 2019-10-24 PROCEDURE — 83540 ASSAY OF IRON: CPT | Performed by: INTERNAL MEDICINE

## 2019-10-24 PROCEDURE — 36415 COLL VENOUS BLD VENIPUNCTURE: CPT | Performed by: INTERNAL MEDICINE

## 2019-10-24 PROCEDURE — 97535 SELF CARE MNGMENT TRAINING: CPT | Mod: GO

## 2019-10-24 PROCEDURE — 25000128 H RX IP 250 OP 636: Performed by: INTERNAL MEDICINE

## 2019-10-24 PROCEDURE — 25000132 ZZH RX MED GY IP 250 OP 250 PS 637: Performed by: PHYSICIAN ASSISTANT

## 2019-10-24 PROCEDURE — 85027 COMPLETE CBC AUTOMATED: CPT | Performed by: INTERNAL MEDICINE

## 2019-10-24 PROCEDURE — 83735 ASSAY OF MAGNESIUM: CPT | Performed by: INTERNAL MEDICINE

## 2019-10-24 PROCEDURE — 93306 TTE W/DOPPLER COMPLETE: CPT

## 2019-10-24 PROCEDURE — 93306 TTE W/DOPPLER COMPLETE: CPT | Mod: 26 | Performed by: INTERNAL MEDICINE

## 2019-10-24 PROCEDURE — 83550 IRON BINDING TEST: CPT | Performed by: INTERNAL MEDICINE

## 2019-10-24 PROCEDURE — 25000132 ZZH RX MED GY IP 250 OP 250 PS 637: Performed by: STUDENT IN AN ORGANIZED HEALTH CARE EDUCATION/TRAINING PROGRAM

## 2019-10-24 PROCEDURE — 82607 VITAMIN B-12: CPT | Performed by: INTERNAL MEDICINE

## 2019-10-24 PROCEDURE — 71045 X-RAY EXAM CHEST 1 VIEW: CPT

## 2019-10-24 PROCEDURE — 99238 HOSP IP/OBS DSCHRG MGMT 30/<: CPT | Performed by: INTERNAL MEDICINE

## 2019-10-24 PROCEDURE — 80048 BASIC METABOLIC PNL TOTAL CA: CPT | Performed by: INTERNAL MEDICINE

## 2019-10-24 PROCEDURE — 93010 ELECTROCARDIOGRAM REPORT: CPT | Mod: 59 | Performed by: INTERNAL MEDICINE

## 2019-10-24 PROCEDURE — 97530 THERAPEUTIC ACTIVITIES: CPT | Mod: GO

## 2019-10-24 PROCEDURE — 97165 OT EVAL LOW COMPLEX 30 MIN: CPT | Mod: GO

## 2019-10-24 PROCEDURE — 84100 ASSAY OF PHOSPHORUS: CPT | Performed by: INTERNAL MEDICINE

## 2019-10-24 PROCEDURE — 82746 ASSAY OF FOLIC ACID SERUM: CPT | Performed by: INTERNAL MEDICINE

## 2019-10-24 PROCEDURE — 93005 ELECTROCARDIOGRAM TRACING: CPT

## 2019-10-24 PROCEDURE — 82728 ASSAY OF FERRITIN: CPT | Performed by: INTERNAL MEDICINE

## 2019-10-24 RX ORDER — CLOPIDOGREL BISULFATE 75 MG/1
75 TABLET ORAL DAILY
Qty: 30 TABLET | Refills: 3 | Status: ON HOLD | OUTPATIENT
Start: 2019-10-24 | End: 2019-10-31

## 2019-10-24 RX ORDER — SUMATRIPTAN 100 MG/1
100 TABLET, FILM COATED ORAL 2 TIMES DAILY PRN
Status: DISCONTINUED | OUTPATIENT
Start: 2019-10-24 | End: 2019-10-24 | Stop reason: HOSPADM

## 2019-10-24 RX ORDER — PROCHLORPERAZINE MALEATE 5 MG
5 TABLET ORAL
Status: DISCONTINUED | OUTPATIENT
Start: 2019-10-24 | End: 2019-10-24 | Stop reason: HOSPADM

## 2019-10-24 RX ADMIN — SUMATRIPTAN 100 MG: 100 TABLET, FILM COATED ORAL at 00:18

## 2019-10-24 RX ADMIN — ASPIRIN 81 MG: 81 TABLET, COATED ORAL at 08:48

## 2019-10-24 RX ADMIN — CLOPIDOGREL BISULFATE 75 MG: 75 TABLET ORAL at 08:47

## 2019-10-24 RX ADMIN — OXYCODONE AND ACETAMINOPHEN 1 TABLET: 10; 325 TABLET ORAL at 08:52

## 2019-10-24 RX ADMIN — ONDANSETRON HYDROCHLORIDE 4 MG: 2 INJECTION, SOLUTION INTRAMUSCULAR; INTRAVENOUS at 00:51

## 2019-10-24 RX ADMIN — OXYCODONE AND ACETAMINOPHEN 1 TABLET: 10; 325 TABLET ORAL at 03:57

## 2019-10-24 RX ADMIN — OMEPRAZOLE 40 MG: 20 CAPSULE, DELAYED RELEASE ORAL at 08:46

## 2019-10-24 RX ADMIN — NICOTINE 1 PATCH: 21 PATCH TRANSDERMAL at 08:58

## 2019-10-24 RX ADMIN — PREGABALIN 100 MG: 50 CAPSULE ORAL at 08:47

## 2019-10-24 RX ADMIN — AMLODIPINE BESYLATE 5 MG: 5 TABLET ORAL at 08:47

## 2019-10-24 RX ADMIN — ACETAMINOPHEN 650 MG: 325 TABLET, FILM COATED ORAL at 03:57

## 2019-10-24 ASSESSMENT — ACTIVITIES OF DAILY LIVING (ADL)
ADLS_ACUITY_SCORE: 13
PREVIOUS_RESPONSIBILITIES: MEAL PREP;MEDICATION MANAGEMENT;DRIVING

## 2019-10-24 ASSESSMENT — PAIN DESCRIPTION - DESCRIPTORS
DESCRIPTORS: ACHING;SHARP;SORE
DESCRIPTORS: ACHING
DESCRIPTORS: SQUEEZING

## 2019-10-24 ASSESSMENT — MIFFLIN-ST. JEOR: SCORE: 1482.48

## 2019-10-24 NOTE — PLAN OF CARE
Occupational Therapy Discharge Summary    Reason for therapy discharge:    Discharged to home with outpatient therapy.    Progress towards therapy goal(s). See goals on Care Plan in [facility] electronic health record for goal details.  Goals partially met.  Barriers to achieving goals:   discharge on same date as initial evaluation.    Therapy recommendation(s):    Continued therapy is recommended.  Rationale/Recommendations:  OP CR to promote increased aerobic activity tolerance and strength for return to ADL/IADL routines.

## 2019-10-24 NOTE — PLAN OF CARE
Admitted 10/24 for a TAVR procedure. Arrived on unit at 1330. Other PMH of HTN, Afib, chronic back pain, chronic migraines, and aortic stenosis. Tobacco user, nicotine patch in place.    Neuro: A&O x 4. No deficits noted. Chronic migraines. PRN Imitrex given at 0020. Q2H neuros.  Cardiac: Atrial flutter. No dizziness. Left LE pulses +1, right LE pulses +2.   Respiratory: Denies SOB. Lung sound clear, minor wheezing on inspiration. Room air.  GI/: Voiding adequately. No BM since procedure. Some N/V. PRN Zofran given without relief. Compazine ordered but pt sleeping.  Diet/Appetite: Ate small amount of ice cream and courtesy meal at 0000 and had nausea. PRN Zofran given without relief. Pt  Skin: Puncture site right radial with clear dressing CDI. Right groin site no hematoma, liquid sutures CDI. Left groin site CDI and no hematoma.  LDA: Left PIV saline locked.  Activity: Independent  Pain: Back and right groin. Right lower leg. PRN Percoset and Tylenol given at 0400. Fentanyl patch in place.     Plan: Pt is anxious to go home to wife having chemo and radiation today. Continue to monitor and alert CSI with any changes or concerns.

## 2019-10-24 NOTE — DISCHARGE SUMMARY
[address] [address]  p: [phone]    Discharge Summary: Cardiology Service    Migel Stringer MRN# [id number]   YOB: 1950 Age: 69 year old       Admission Date: 10/23/2019  Discharge Date: 10/24/19      Discharge Diagnoses:  1. Severe aortic stenosis s/p TAVR 10/23/2019  2. Non-obstructing CAD  3. Atrial fibrillation s/p Watchman 2/2019  4. Chronic low back pain  5. RLE neuropathy  6. History of AAA  7. Chronic Iron deficiency anemia     Pertinent Procedures:  1. TAVR    Consults:  PT/OT      Imaging with results:  Echocardiogram 10/24/2019:  Interpretation Summary  TAVR with 34 mm Medtronic Evolut R. The mean gradient across the aortic valve  is7 mmHg. Trace para-valvular aortic insufficiency is present.  Global and regional left ventricular function is hyperkinetic with an EF >70%.  There is minimal intra-cavitary gradient.  No pericardial effusion is present.  Right ventricular function, chamber size, wall motion, and thickness are  normal.  IVC diameter and respiratory changes fall into an intermediate range  suggesting an RA pressure of 8 mmHg.  The aorta root is normal.  Compared to yesterday, AI is slightly improved, no other change.  __________________________________________________________________  Left Ventricle  Global and regional left ventricular function is hyperkinetic with an EF >70%.  Left ventricular size is normal. Mild to moderate concentric wall thickening  consistent with left ventricular hypertrophy is present. Left ventricular  diastolic function is not assessable.     Right Ventricle  Right ventricular function, chamber size, wall motion, and thickness are  normal.     Atria  Severe left atrial enlargement is present. Mild right atrial enlargement is  present.     Mitral Valve  The mitral valve is normal.        Aortic Valve  Trace aortic insufficiency is present. The mean gradient across the  aortic  valve is7 mmHg. A bioprosthetic aortic valve is present. Doppler interrogation  of the aortic valve is normal.     Tricuspid Valve  The tricuspid valve is normal. The peak velocity of the tricuspid regurgitant  jet is not obtainable.     Pulmonic Valve  The pulmonic valve cannot be assessed.     Vessels  The aorta root is normal. IVC diameter and respiratory changes fall into an  intermediate range suggesting an RA pressure of 8 mmHg.     Pericardium  No pericardial effusion is present.      Brief HPI:  Randall Sommers is a 69 year old male with severe aortic stenosis (0.9cm2, mean gradient 45.6 mmHg) with progressive shortness of breath that was first noted in pre-operative assessment for a GI procedure when patient scheduled for a echo stress. Also with history of CAD, hypertension, hyperlipidemia, mitral valve regurgitation, AAA, left renal mass, history of TIA, atrial fibrillation with history of Watchman device, chronic fatigue, migraines, RLE neuropathy, chronic low back pain, GERD and insomnia. He presents for a TAVR and is now status post 34 mm Medtronic Evolut R valve without significant complication. His aortic stenosis was first recognized when he had a pre-operative workup for possible abdominal surgery due to a stricture.      Randall describes progressive shortness of breath with activity lasting several months in duration.    Hospital Course by Diagnosis:  #Severe Aortic Stenosis   Now Status post TAVR (34 mm Medtronic Evolut R). No immediate complications, no significant arrhythmia on telemetry overnight. POD 1 echocardiogram demonstrated an MG of 7mmHg, with trace para-valvular AI. POD 1 EKG with atrial fibrillation, narrow QRS.   -Aspirin and plavix   -Considered BB, HR 60 in afib however. Will defer to outpatient.    -Cardiac rehab   -Dental prophylaxis in future      #Non-obstructing CAD   Patient had coronary angiogram for TAVR workup that showed a 50% lesion in the RCA without intervention.    -Aspirin and plavix as above  -Continue lipitor 20 mg at bedtime   -Lifestyle modifications and risk factor management      #Rate controlled atrial fibrillation  #History of watchman   -continue telemetry   -History of blood loss anemia on anticoag. Not on AC due to bleed risk.     #Acute on chronic anemia   ##History of upper GI bleed   ###History of chronic blood loss anemia   Presenting hemoglobin notably 8.6 then >> 7.0 after TAVR case likely due to dilution and losses during procedure. Hgb stable with q4h checks overnight. Iron, b12, and folate studies indicative of iron deficiency anemia. Is on iron supplementation, however, may benefit from escalation of therapy. Instructed to see his PCP within 1 week of discharge.  -Continue protonix  - continue ferritin     #Chronic lower back pain  ##RLE Neuropathy   Patient has unilateral peripheral neuropathy due to a traumatic spinal injury in 2004 and has had multiple back surgeries. Acute on chronic pain after bedrest. Did consider RP bleeding with Hemoglobin drop but patient symptoms inconsistent with this. Hgb remained stable overnight, no concern for active bleed.    -Continue lyrica      History of TIA   August 2018 without residual   - aspirin, plavix, atorvastatin as above.    Other non-active issues   History of AAA  Left Renal Mass   Chronic Fatigue  Migraines      CODE: Full  FENA:  Cardiac diet   DVT ppx: Mechanical   Disposition: Admitted to [facility]       Condition on discharge  Temp:  [97.5  F (36.4  C)-98.5  F (36.9  C)] 98.5  F (36.9  C)  Pulse:  [51-95] 86  Heart Rate:  [50-95] 95  Resp:  [10-16] 16  BP: ()/(53-92) 129/78  SpO2:  [93 %-100 %] 96 %  General: Alert, interactive, NAD  Eyes: sclera anicteric, EOMI  Neck: JVP WNL, carotid 2+ bilaterally  Cardiovascular: irregular rate and rhythm, normal S1 and S2, no murmurs, gallops, or rubs  Resp: clear to auscultation bilaterally, no rales, wheezes, or rhonchi  GI: Soft, nontender, nondistended. +BS.   No HSM or masses, no rebound or guarding.  Extremities: no edema, no cyanosis or clubbing, dorsalis pedis and posterior tibialis pulses 2+ bilaterally. Right groin pulsatile, but non-tender, no bruising, no obvious hematoma. Left groin c/d/i, no hematoma. Pedal pulses 2+ bilaterally  Skin: Warm and dry, no jaundice or rash  Neuro: CN 2-12 intact, moves all extremities equally  Psych: Alert & oriented x 3    Medication Changes:  START aspirin 81mg daily  START Clopidogrel 75mg daily    Discharge medications:   Discharge Medication List as of 10/24/2019 10:40 AM      START taking these medications    Details   aspirin (ASA) 81 MG EC tablet Take 1 tablet (81 mg) by mouth daily, Disp-30 tablet, R-3, E-Prescribe         CONTINUE these medications which have CHANGED    Details   clopidogrel (PLAVIX) 75 MG tablet Take 1 tablet (75 mg) by mouth daily, Disp-30 tablet, R-3, E-Prescribe         CONTINUE these medications which have NOT CHANGED    Details   albuterol (PROAIR HFA/PROVENTIL HFA/VENTOLIN HFA) 108 (90 Base) MCG/ACT Inhaler Inhale 1-2 puffs into the lungs every 6 hours as needed , Historical      amLODIPine (NORVASC) 5 MG tablet Take 1 tablet (5 mg) by mouth daily Annual appt due in cardiology, Disp-30 tablet, R-3, E-Prescribe      atorvastatin (LIPITOR) 20 MG tablet Take 20 mg by mouth At Bedtime , Historical      diphenhydrAMINE-acetaminophen (TYLENOL PM)  MG tablet Take 2 tablets by mouth nightly as needed , Historical      omeprazole (PRILOSEC) 40 MG capsule Take 1 capsule (40 mg) by mouth 2 times daily Take 30-60 minutes before a meal., Disp-60 capsule, R-11, E-Prescribe      oxyCODONE-acetaminophen (PERCOCET)  MG per tablet Take 1 tablet by mouth every 4 hours as needed , Historical      pregabalin (LYRICA) 100 MG capsule Take 100 mg by mouth 4 times daily, Historical      prochlorperazine (COMPAZINE) 10 MG tablet Take 10 mg by mouth every 8 hours as needed for nausea or vomiting, Historical       SUMAtriptan (IMITREX) 100 MG tablet Take 100 mg by mouth 2 times daily as needed , Historical      traZODone (DESYREL) 100 MG tablet Take 100 mg by mouth At Bedtime, Historical      fentaNYL (DURAGESIC) 50 mcg/hr 72 hr patch Place 1 patch onto the skin every 72 hours , Historical      ferrous sulfate (FE TABS) 325 (65 Fe) MG EC tablet Take 325 mg by mouth daily, Historical      senna-docusate (SENOKOT-S/PERICOLACE) 8.6-50 MG tablet Take 1 tablet by mouth daily as needed for constipation, Historical             Labs or imaging requiring follow-up after discharge:  Imaging per valve clinic  Additional anemia studies per PCP      Follow-up:  PCP within 5-7 days to discuss anemia  Valve clinic per protocol.     Code status:  FULL      ATTENDING NOTE:  Patient has been seen and evaluated by me on 10/24/2019. I have reviewed the documentation above.  I have reviewed today's vital signs, medications, labs, and imaging results.  I have reviewed and edited, as necessary, the history, review of systems, physical examination, and assessment and plan.  I have discussed my assessment and plan with the physician assistant.  I have seen and examined the patient today as part of a shared visit with Braulio Whitmore PA-C.  Migel Stringer is a 69 year old male with risk factor profile (+) HTN, (-) DM, (+) hypercholesterolemia, (-) tobacco use, (-) fam Hx premature CAD, known CAD (50% mRCA, 30% LCx, 30% LAD), Hx LAAC (2019), severe aortic stenosis (DULCE 0.9 cm2, mean gradient 45.6 mm Hg), underwent 34 mm Medtronic Evolut R bioprosthesis yesterday without immediate complications.  Repeat CXR and ECHO today.  He has chronic anemia with prior GI bleeding (EGD and colonoscopy did not reveal pathology although hemorrhoids detected).   Hgb 8 pre and 7.9 post.   F/U with PCP for aenmia.  FeSO4 on discharge.  Discharge on ASA + Plavix.  Exam documented above.    Jovon White MD     Cardiovascular Division

## 2019-10-24 NOTE — PLAN OF CARE
D/I/A: Pt up ad neno.  Tolerating activity.  Bilat groin sites C/D/I; R radial site C/D/I.  VSSA.  A-Flutter on monitor.  A+O x4 and able to make needs known.  Fentanyl and Nicotine patches in place.  Pt resting between cares and treatments.    P: Continue to monitor status.

## 2019-10-24 NOTE — PROGRESS NOTES
"   10/24/19 1000   Quick Adds   Type of Visit Initial Occupational Therapy Evaluation   Living Environment   Lives With spouse   Living Arrangements house   Home Accessibility stairs within home   Number of Stairs, Within Home, Primary other (see comments)  (13: 4+9 steps)   Stair Railings, Within Home, Primary railings safe and in good condition   Transportation Anticipated car, drives self;family or friend will provide   Living Environment Comment Patient lives with wife in a house. 13 stairs to access basement (primary staying on this level - bathroom on this level). Daughter able to assist with tasks post-recovery. Bathtub/shower combo present.    Self-Care   Usual Activity Tolerance fair   Current Activity Tolerance moderate   Regular Exercise No   Equipment Currently Used at Home none   Activity/Exercise/Self-Care Comment Patient limited in ability to exercise given SOB/decreased activity tolerance. Reports feeling \"much better\" post-op. Stating \"it's strange to not feel short of breath/take breaks with walking\".    Functional Level   Ambulation 0-->independent   Transferring 0-->independent   Toileting 0-->independent   Bathing 0-->independent   Dressing 0-->independent   Eating 0-->independent   Communication 0-->understands/communicates without difficulty   Swallowing 0-->swallows foods/liquids without difficulty   Cognition 0 - no cognition issues reported   Fall history within last six months no   Which of the above functional risks had a recent onset or change? none   Prior Functional Level Comment Patient was independent with ADL tasks, though showering was becoming more fatiguing. No AE use at home at baseline.   General Information   Onset of Illness/Injury or Date of Surgery - Date 10/23/19   Referring Physician Juan Ramirez MD   Patient/Family Goals Statement To go home as soon as possible. Return to fishing. Interested in OP CR.   Additional Occupational Profile Info/Pertinent History of " "Current Problem Per chart: \"Randall Sommers is a 69 year old male with severe aortic stenosis that was first noted in pre-operative assessment for a GI procedure CAD, hypertension, hyperlipidemia, mitral valve regurgitation, AAA, left renal mass, history of TIA, atrial fibrillation with history of Watchman device, chronic fatigue, migraines, RLE neuropathy, chronic low back pain, GERD and insomnia. He presents for a TAVR and is now status post 34 mm Medtronic Evolut R valve without significant complication.\"   Precautions/Limitations fall precautions;other (see comments)  (TAVR )   Weight-Bearing Status - LUE partial weight-bearing (% in comments)  (10 lb. lifting restriction)   Weight-Bearing Status - RUE partial weight-bearing (% in comments)  (10 lb. lifting restriction)   General Observations Patient greeted supine. Fully dressed; family present. Pleasant and cooperative. Motivated to return home as soon as possible.   General Info Comments Activity orders: Up in chair. Early ICU mobility guidelines.   Cognitive Status Examination   Orientation orientation to person, place and time   Cognitive Comment Cognition appears grossly intact.   Visual Perception   Visual Perception Wears glasses  (Cheaters)   Visual Perception Comments Denies acute visual changes.   Sensory Examination   Sensory Comments Chronic neuropathy/numbness in RLE from knee down at baseline.   Pain Assessment   Patient Currently in Pain Yes, see Vital Sign flowsheet  (Chronic lower back pain at baseline.)   Integumentary/Edema   Integumentary/Edema Comments Groin incision s/p TAVR   Range of Motion (ROM)   ROM Comment UE WFL   Strength   Strength Comments Not formally tested due to post-op precautions. Appears WFL   Transfer Skill: Bed to Chair/Chair to Bed   Level of [place]: Bed to Chair independent   Transfer Skill: Sit to Stand   Level of [place]: Sit/Stand independent   Balance   Balance Comments Patient with slightly sauntering gait. " "Forward flexed posture with chronic low back pain. No LOB noted. No AE utilized.    Upper Body Dressing   Level of [place]: Dress Upper Body   (Patient reports IND. )   Lower Body Dressing   Level of [place]: Dress Lower Body other (see comments)  (Patient reports IND)   Instrumental Activities of Daily Living (IADL)   Previous Responsibilities meal prep;medication management;driving   IADL Comments Wife and daughter assist with IADL tasks. Patient did some cooking. Drives when \"feeling well\", otherwise wife assists. Enjoys fishing   Activities of Daily Living Analysis   Impairments Contributing to Impaired Activities of Daily Living pain;post surgical precautions;strength decreased  (Deconditioning)   General Therapy Interventions   Planned Therapy Interventions ADL retraining;IADL retraining;home program guidelines;progressive activity/exercise;risk factor education;other (see comments)  (EC/WS)   Clinical Impression   Criteria for Skilled Therapeutic Interventions Met yes, treatment indicated   OT Diagnosis Decreased ADL/IADL independence/endurance s/p TAVR.   Influenced by the following impairments Deconditioning, Post-op Precautions   Assessment of Occupational Performance 1-3 Performance Deficits   Identified Performance Deficits Home Management, Leisure   Clinical Decision Making (Complexity) Low complexity   Therapy Frequency 5x/week   Predicted Duration of Therapy Intervention (days/wks) 3 days   Anticipated Equipment Needs at Discharge other (see comments)  (May benefit from shower chair.)   Anticipated Discharge Disposition Home with Assist;Home with Outpatient Therapy   Risks and Benefits of Treatment have been explained. Yes   Patient, Family & other staff in agreement with plan of care Yes   Clinical Impression Comments Patient would benefit from skilled OT services while inpatient to facilitate safe return to ADL/aeorbic activity s/p TAVR.   [facility] AM-PAC  \"6 Clicks\" Daily " Activity Inpatient Short Form   1. Putting on and taking off regular lower body clothing? 4 - None   2. Bathing (including washing, rinsing, drying)? 3 - A Little   3. Toileting, which includes using toilet, bedpan or urinal? 4 - None   4. Putting on and taking off regular upper body clothing? 4 - None   5. Taking care of personal grooming such as brushing teeth? 4 - None   6. Eating meals? 4 - None   Daily Activity Raw Score (Score out of 24.Lower scores equate to lower levels of function) 23   Total Evaluation Time   Total Evaluation Time (Minutes) 5

## 2019-10-24 NOTE — PLAN OF CARE
"OT/6C:  Discharge Planner OT   Patient plan for discharge: Home with assist and OP CR.   Current status: Patient educated on TAVR precautions and application to safe return to activity, signs/symptoms of activity intolerance, OMNI scale and HEP tracker. Reports IND with dressing this AM. Ambulates ~150 feet to therapy gym with SBA. No LOB noted. HR up to 130-140's with ambulation - PA notified. Quickly returns to 's with seated rest break. Ascends/descends 3 stairs x3 with SBA.  Patient denies adverse symptoms - reports to feel \"much better\" post-procedure.   Barriers to return to prior living situation: Medical readiness  Recommendations for discharge: Home with assist and OP CR - preferred location is [facility]  Rationale for recommendations: Assist for heavy lifting tasks prn to maintain precautions. OP CR to increase aerobic activity tolerance and strength.        Entered by: Beti Wood 10/24/2019 10:25 AM       "

## 2019-10-24 NOTE — CONSULTS
Social Work Services Consult Note:      Hospital Day: 1  Date of Initial Social Work Evaluation:  Not yet completed  Collaborated with:  CSI team, chart review     Data: SW consulted for TAVR follow up/discharge planning.      Intervention:  No needs indicated at this time, will follow if other needs arise.    Assessment: Will meet with pt if indicated for services.     Plan:    Anticipated Disposition: Pending rehab consult/evaluation    Barriers to d/c plan: Medical stability as indicated in chart.    Follow Up: Please reconsult if other SW needs arise.  RNCC to follow if discharging to home.     COREY Obrien, LCSW  6C Unit   Phone: [phone]  Pager: [phone]  Unit: [phone]

## 2019-10-24 NOTE — DISCHARGE INSTRUCTIONS
Going home after Transcatheter Aortic Valve Replacement (TAVR)  Femoral Access  You may have small amounts of bruising or discomfort, this is expected. If you develop worse swelling, redness and warmth that does not go away, fever and chills, Increasing numbness in your legs, worsening pain at the site then you need to contact your nurse coordinator.    You may shower, but do not soak in a bath tub or pool or apply lotions, ointments, powder, etc for 3-5 days or until sites look healed.     Activity: No lifting, pushing, pulling more than 10 pounds (examples to avoid: groceries, vacuuming, gardening, golfing): For one week with a procedure through the groin.    After the initial healing process of the access site, we recommend cardiac rehabilitation for all TAVR patients. Cardiac rehabilitation will help you:  - Rebuild stamina, strength and balance.  - Learn how to participate in activities safely, as well as help you regain confidence to do so.  - Return to activities of daily living and leisure.  Please contact their central scheduling to arrange at [phone]    We prefer you to follow up with your primary care provider within 2 weeks. You will be arranged to see the TAVR clinic in month. At that time you will have a repeat ultrasound of the heart to look at the valve.     Should you have any questions or concerns please contact your assigned TAVR nurse coordinator:  Eloise [phone] (at the first prompt enter #1, second prompt enter #3, then ask the triage nurse if you can speak with Eloise).

## 2019-10-24 NOTE — PLAN OF CARE
DISCHARGE   Discharged to: Home  Via: Automobile  Accompanied by: Family- wife and daughter  Discharge Instructions: diet, activity, medications, follow up appointments, when to call the MD, and what to watchout for (i.e. s/s of infection, increasing SOB, palpitations, chest pain,)  Prescriptions: To be filled by [facility] in [place], [place] per pt's request; medication list reviewed & sent with pt  Follow Up Appointments: arranged; information given  Belongings: All sent with pt  IV: out  Telemetry: off  Pt exhibits understanding of above discharge instructions; all questions answered.  Discharge Paperwork: faxed     Patient left unit 6C at 11 via a wheel chair. His rhythm was afib but is rate controlled and is chronic.

## 2019-10-25 DIAGNOSIS — Z95.2 S/P TAVR (TRANSCATHETER AORTIC VALVE REPLACEMENT): Primary | ICD-10-CM

## 2019-10-29 ENCOUNTER — TRANSFERRED RECORDS (OUTPATIENT)
Dept: HEALTH INFORMATION MANAGEMENT | Facility: CLINIC | Age: 69
End: 2019-10-29

## 2019-10-29 ENCOUNTER — HOSPITAL ENCOUNTER (INPATIENT)
Facility: CLINIC | Age: 69
LOS: 2 days | Discharge: HOME | DRG: 378 | End: 2019-10-31
Attending: INTERNAL MEDICINE | Admitting: INTERNAL MEDICINE
Payer: MEDICARE

## 2019-10-29 DIAGNOSIS — K92.2 ACUTE UPPER GI BLEED: Primary | ICD-10-CM

## 2019-10-29 PROBLEM — I35.0 AORTIC STENOSIS, SEVERE: Status: RESOLVED | Noted: 2019-10-23 | Resolved: 2019-10-29

## 2019-10-29 PROBLEM — E78.5 DYSLIPIDEMIA: Chronic | Status: ACTIVE | Noted: 2018-07-05

## 2019-10-29 PROBLEM — I48.91 ATRIAL FIBRILLATION (H): Chronic | Status: ACTIVE | Noted: 2019-02-05

## 2019-10-29 PROBLEM — I48.91 A-FIB (H): Status: RESOLVED | Noted: 2018-12-01 | Resolved: 2019-10-29

## 2019-10-29 PROBLEM — Z86.69 HISTORY OF MIGRAINE: Chronic | Status: ACTIVE | Noted: 2018-01-03

## 2019-10-29 PROBLEM — Z98.84 HISTORY OF ROUX-EN-Y GASTRIC BYPASS: Chronic | Status: ACTIVE | Noted: 2018-06-15

## 2019-10-29 PROBLEM — I63.81 LACUNAR INFARCTION (H): Chronic | Status: ACTIVE | Noted: 2017-12-27

## 2019-10-29 PROBLEM — I48.91 ATRIAL FIBRILLATION (H): Chronic | Status: RESOLVED | Noted: 2019-02-05 | Resolved: 2019-10-29

## 2019-10-29 PROBLEM — I48.91 ATRIAL FIBRILLATION (H): Status: RESOLVED | Noted: 2019-02-05 | Resolved: 2019-10-29

## 2019-10-29 PROBLEM — Z98.84 STATUS POST BARIATRIC SURGERY: Status: RESOLVED | Noted: 2018-07-30 | Resolved: 2019-10-29

## 2019-10-29 PROBLEM — G89.29 CHRONIC PAIN: Chronic | Status: ACTIVE | Noted: 2018-07-05

## 2019-10-29 PROBLEM — D50.0 IRON DEFICIENCY ANEMIA DUE TO CHRONIC BLOOD LOSS: Chronic | Status: ACTIVE | Noted: 2017-10-28

## 2019-10-29 PROBLEM — I35.0 SEVERE AORTIC STENOSIS: Chronic | Status: ACTIVE | Noted: 2019-05-14

## 2019-10-29 PROBLEM — I35.0 MODERATE AORTIC STENOSIS: Status: RESOLVED | Noted: 2017-12-18 | Resolved: 2019-10-29

## 2019-10-29 LAB
ERYTHROCYTE [DISTWIDTH] IN BLOOD BY AUTOMATED COUNT: 18.4 % (ref 10–15)
HCT VFR BLD AUTO: 25.8 % (ref 40–53)
HGB BLD-MCNC: 7.5 G/DL (ref 13.3–17.7)
MCH RBC QN AUTO: 25 PG (ref 26.5–33)
MCHC RBC AUTO-ENTMCNC: 29.1 G/DL (ref 31.5–36.5)
MCV RBC AUTO: 86 FL (ref 78–100)
PLATELET # BLD AUTO: 147 10E9/L (ref 150–450)
RBC # BLD AUTO: 3 10E12/L (ref 4.4–5.9)
WBC # BLD AUTO: 7.5 10E9/L (ref 4–11)

## 2019-10-29 PROCEDURE — 85027 COMPLETE CBC AUTOMATED: CPT | Performed by: PHYSICIAN ASSISTANT

## 2019-10-29 PROCEDURE — 36415 COLL VENOUS BLD VENIPUNCTURE: CPT | Performed by: PHYSICIAN ASSISTANT

## 2019-10-29 PROCEDURE — 25000132 ZZH RX MED GY IP 250 OP 250 PS 637: Performed by: PHYSICIAN ASSISTANT

## 2019-10-29 PROCEDURE — 96374 THER/PROPH/DIAG INJ IV PUSH: CPT

## 2019-10-29 PROCEDURE — 25000128 H RX IP 250 OP 636: Performed by: PHYSICIAN ASSISTANT

## 2019-10-29 PROCEDURE — C9113 INJ PANTOPRAZOLE SODIUM, VIA: HCPCS | Performed by: PHYSICIAN ASSISTANT

## 2019-10-29 PROCEDURE — G0378 HOSPITAL OBSERVATION PER HR: HCPCS

## 2019-10-29 PROCEDURE — 99220 ZZC INITIAL OBSERVATION CARE,LEVL III: CPT | Performed by: PHYSICIAN ASSISTANT

## 2019-10-29 PROCEDURE — 96361 HYDRATE IV INFUSION ADD-ON: CPT

## 2019-10-29 PROCEDURE — 25800030 ZZH RX IP 258 OP 636: Performed by: PHYSICIAN ASSISTANT

## 2019-10-29 RX ORDER — NALOXONE HYDROCHLORIDE 0.4 MG/ML
.1-.4 INJECTION, SOLUTION INTRAMUSCULAR; INTRAVENOUS; SUBCUTANEOUS
Status: DISCONTINUED | OUTPATIENT
Start: 2019-10-29 | End: 2019-10-31 | Stop reason: HOSPADM

## 2019-10-29 RX ORDER — FERROUS SULFATE 325(65) MG
325 TABLET ORAL DAILY
Status: DISCONTINUED | OUTPATIENT
Start: 2019-10-30 | End: 2019-10-31 | Stop reason: HOSPADM

## 2019-10-29 RX ORDER — OXYCODONE AND ACETAMINOPHEN 10; 325 MG/1; MG/1
1 TABLET ORAL EVERY 4 HOURS PRN
Status: DISCONTINUED | OUTPATIENT
Start: 2019-10-29 | End: 2019-10-31 | Stop reason: HOSPADM

## 2019-10-29 RX ORDER — PREGABALIN 100 MG/1
100 CAPSULE ORAL 4 TIMES DAILY
Status: DISCONTINUED | OUTPATIENT
Start: 2019-10-29 | End: 2019-10-31 | Stop reason: HOSPADM

## 2019-10-29 RX ORDER — TRAZODONE HYDROCHLORIDE 100 MG/1
100 TABLET ORAL AT BEDTIME
Status: DISCONTINUED | OUTPATIENT
Start: 2019-10-29 | End: 2019-10-31 | Stop reason: HOSPADM

## 2019-10-29 RX ORDER — ONDANSETRON 4 MG/1
4 TABLET, ORALLY DISINTEGRATING ORAL EVERY 6 HOURS PRN
Status: DISCONTINUED | OUTPATIENT
Start: 2019-10-29 | End: 2019-10-31 | Stop reason: HOSPADM

## 2019-10-29 RX ORDER — SODIUM CHLORIDE, SODIUM LACTATE, POTASSIUM CHLORIDE, CALCIUM CHLORIDE 600; 310; 30; 20 MG/100ML; MG/100ML; MG/100ML; MG/100ML
INJECTION, SOLUTION INTRAVENOUS CONTINUOUS
Status: DISCONTINUED | OUTPATIENT
Start: 2019-10-29 | End: 2019-10-30

## 2019-10-29 RX ORDER — ASPIRIN 81 MG/1
81 TABLET ORAL DAILY
Status: DISCONTINUED | OUTPATIENT
Start: 2019-10-30 | End: 2019-10-31 | Stop reason: HOSPADM

## 2019-10-29 RX ORDER — ACETAMINOPHEN 325 MG/1
650 TABLET ORAL EVERY 4 HOURS PRN
Status: DISCONTINUED | OUTPATIENT
Start: 2019-10-29 | End: 2019-10-31 | Stop reason: HOSPADM

## 2019-10-29 RX ORDER — ATORVASTATIN CALCIUM 20 MG/1
20 TABLET, FILM COATED ORAL AT BEDTIME
Status: DISCONTINUED | OUTPATIENT
Start: 2019-10-29 | End: 2019-10-31 | Stop reason: HOSPADM

## 2019-10-29 RX ORDER — ONDANSETRON 2 MG/ML
4 INJECTION INTRAMUSCULAR; INTRAVENOUS EVERY 6 HOURS PRN
Status: DISCONTINUED | OUTPATIENT
Start: 2019-10-29 | End: 2019-10-31 | Stop reason: HOSPADM

## 2019-10-29 RX ORDER — LIDOCAINE 40 MG/G
CREAM TOPICAL
Status: DISCONTINUED | OUTPATIENT
Start: 2019-10-29 | End: 2019-10-31 | Stop reason: HOSPADM

## 2019-10-29 RX ADMIN — PREGABALIN 100 MG: 100 CAPSULE ORAL at 19:26

## 2019-10-29 RX ADMIN — ATORVASTATIN CALCIUM 20 MG: 20 TABLET, FILM COATED ORAL at 21:58

## 2019-10-29 RX ADMIN — OXYCODONE AND ACETAMINOPHEN 1 TABLET: 10; 325 TABLET ORAL at 21:58

## 2019-10-29 RX ADMIN — PANTOPRAZOLE SODIUM 40 MG: 40 INJECTION, POWDER, FOR SOLUTION INTRAVENOUS at 17:38

## 2019-10-29 RX ADMIN — SODIUM CHLORIDE, POTASSIUM CHLORIDE, SODIUM LACTATE AND CALCIUM CHLORIDE: 600; 310; 30; 20 INJECTION, SOLUTION INTRAVENOUS at 17:25

## 2019-10-29 RX ADMIN — TRAZODONE HYDROCHLORIDE 100 MG: 100 TABLET ORAL at 21:58

## 2019-10-29 RX ADMIN — OXYCODONE AND ACETAMINOPHEN 1 TABLET: 10; 325 TABLET ORAL at 17:37

## 2019-10-29 ASSESSMENT — MIFFLIN-ST. JEOR: SCORE: 1475.25

## 2019-10-29 ASSESSMENT — ACTIVITIES OF DAILY LIVING (ADL): ADLS_ACUITY_SCORE: 13

## 2019-10-29 NOTE — H&P
[facility]    History and Physical - Hospitalist Service       Date of Admission:  10/29/2019    Assessment & Plan   Migel Stringer is a 69 year old male admitted on 10/29/2019. He has history of recent TAVR 10/23/2019, Robel-en-Y with previous anastomotic site ulcer, iron deficiency anemia, chronic atrial fibrillation s/p watchman, coronary artery disease, chronic low back pain, neuropathy, hypertension and ischemic stroke. He presents following hematemesis.     Upper GI Bleed  4 episodes of dark red to coffee ground hematemesis on 10/28/19. No associated symptoms. Recently restarted home aspirin and started Plavix following TAVR on 10/23/19. As below history of ulcer near GJ site of Robel-en-Y bypass anastomosis site. Vital signs stable. Initial blood pressure mildly low and HR mildly tachycardic, repeat improved. Hemoglobin at outside hospital 8.6, improved from 7.9 from TAVR discharge. Suspect recurrent GI bleed, may be due recurrence of previous GJ ulcer vs gastritis exacerbated by initiation of dual anti-platelet therapy.  - IV pantoprazole Q12 hours  - Clear liquid diet   - NPO at midnight   - LR at 75 ml/hr   - hemoglobin Q6 hours  - hold home Plavix  - surgery consult for EGD    History of Robel-en-Y with recurrent GI bleed due to ulcer near anastomosis site  History of Robel-en-Y in 2011 which has been complicated by recurrent upper GI bleeding due to a gastrojejunal ulcer near the anastomosis site. Appears to have undergone subsequent dilation and stenting due to stricture as well.   - work up for GI bleed as above  - hold home omeprazole while receiving IV PPI as above    Chronic Iron Deficiency Anemia  Baseline of 8.3 - 12.1 in the past year. Recent work up showed iron deficiency.  - continue home ferrous sulfate     Severe Aortic Stenosis s/p TAVR 10/23/2019  Underwent TAVR at [facility], bioprosthetic valve placed. No significant complications identified at time of discharge on  10/24/19. No beta blocker due to HR of 60. Will need dental PPX.   - continue home aspirin  - hold home Plavix  - continue previous plan for cardiac rehab    Chronic Atrial Fibrillation s/p watchman 2/2019  Underwent watchman due to history of GI bleeding. Adequate rate control without medications.    Coronary Artery Disease, mild non-obstructive  Noted during TAVR angiogram, 50% lesion in RCA.  - continue home ASA, statin    Chronic Shortness of Breath  Reportedly over the past 1.5 years. Recently underwent TAVR for severe aortic stenosis. Reportedly previously saw a pulmonologist and underwent some sort of pulmonary testing though no notes on file. Long time smoker trying to quit entirely.   - consider further work up as outpatient  - continue plan for cardiac rehab following TAVR    Chronic Low Back Pain  RLE Neuropathy  Chronic. Stable.  - continue home pregabalin and Percocet     Hypertension  Chronic. Blood pressures reviewed, SBP 93 on presentation.   - hold home amlodipine due to soft pressures    History of ischemic stroke  Lacunar infarcts occurred 8/2018, presumed cardiogenic. No residual deficits.   - continue home statin and aspirin  - hold home Plavix with GI bleed     Diet: Clear liquid diet, NPO at midnight  DVT Prophylaxis: Low Risk/Ambulatory with no VTE prophylaxis indicated  Flores Catheter: not present  Code Status: Full code    Disposition Plan   Expected discharge: Tomorrow, recommended to prior living arrangement once hemoglobin stable, vitals remain stable, no further signs of bleeding and EGD complete.  Entered: Amanda Oliver PA-C 10/29/2019, 4:08 PM     The patient's care was discussed with the Attending Physician, Dr. Pavel Abreu and Patient.    Amanda Oliver PA-C  [facility]  ______________________________________________________________________    Chief Complaint   Bloody emesis    History is obtained from the patient and electronic health  record    History of Present Illness   Migel Stringer is a 69 year old male who presents with hematemesis.    Yesterday around 11 AM he had an episode of emesis which had a dark red tinge to it. He went on to have a total of 4 episodes of blood emesis ultimately appearing coffee-ground in color/texture. He did not have any associated abdominal pain or bloody/black stools. He has not felt lightheaded, dizzy or fatigued. Over the past 1.5 years he has felt short of breath, this has not worsened or changed really in the past week. He denies chest pains.    He underwent a TAVR at the [facility] on 10/23/19. He was continue on his home aspirin and started on Plavix following the procedure. He does have a history of a previous foster-en-Y and had issues with stricture at the GJ anastomosis site which was treated with dilation and then stent placement in the past. He has had issues with prior upper GI bleeds due to ulcers at that anastomosis site, he estimates about 4 or so. EGDs in 7/2018, 5/2017, 7/0219, 2/2016 showed an ulcer at the anastomosis site. An EGD from 6/2018 showed chronic gastritis.     He further denies fever, chills, myalgias, cough, congestion, rhinorrhea, sore throat,palpitations, chest pain, changes in urination, rash or wounds.    Review of Systems    The 10 point Review of Systems is negative other than noted in the HPI or here.     Past Medical History    I have reviewed this patient's medical history and updated it with pertinent information if needed.   Past Medical History:   Diagnosis Date     AAA (abdominal aortic aneurysm) (H)      Aortic stenosis      Asthma      Atrial fibrillation (H)      Chronic low back pain      Chronic nausea      GERD (gastroesophageal reflux disease)      Heart murmur      Heart rate problem      History of heparin-induced thrombocytopenia     probably history based on symptoms, repeat titers negative     History of sleep apnea      Hyperlipidemia      Hypertension       Insomnia      Left renal mass      Migraines      Mitral regurgitation      Peripheral neuropathy      Renal mass      Status post gastric bypass for obesity      Ulcer, gastric, acute      Vomiting in adult      Past Surgical History   I have reviewed this patient's surgical history and updated it with pertinent information if needed.  Past Surgical History:   Procedure Laterality Date     CHOLECYSTECTOMY       COLONOSCOPY       CV LEFT ATRIAL APPENDAGE CLOSURE N/A 2/5/2019    Procedure: Left Atrial Appendage Closure;  Surgeon: Michael Diaz MD;  Location:  HEART CARDIAC CATH LAB     CV TRANSCATHETER AORTIC VALVE REPLACEMENT N/A 10/23/2019    Procedure: Femoral Transcatheter  Aortic Valve Replacement with Medtronic Evolut R 34mm Valve, on Cardiopulmonary Bypass Pump Standby, Transthoracic Echocardiogram;  Surgeon: Michael Diaz MD;  Location:  OR     ESOPHAGOGASTRODUODENOSCOPY       GASTRIC BYPASS  2011     HEART CATH FEMORAL CANNULIZATION WITH OPEN STANDBY REPAIR AORTIC VALVE N/A 10/23/2019    Procedure: t;  Surgeon: Theo Ulloa MD;  Location: [facility] OR     HERNIA REPAIR       SPINE SURGERY      hx of 6 lumbar surgeries and 1 thoracic surgery     Social History   I have reviewed this patient's social history and updated it with pertinent information if needed.  Social History     Tobacco Use     Smoking status: Current Every Day Smoker     Packs/day: 1.00     Years: 50.00     Pack years: 50.00     Types: Cigarettes, Cigars     Smokeless tobacco: Never Used     Tobacco comment: quit cigarettes in 2016.  now smokes a small cigar daily   Substance Use Topics     Alcohol use: No     Drug use: No     Family History   I have reviewed this patient's family history and updated it with pertinent information if needed.   Family History   Problem Relation Age of Onset     Skin Cancer Mother      Chronic Obstructive Pulmonary Disease Mother      Diabetes Mother      Liver Cancer Father      Breast  Cancer Father      Breast Cancer Sister      No Known Problems Brother      Diabetes Maternal Grandmother      Cancer Paternal Grandmother         unspecified cancer     No Known Problems Brother      Cancer Paternal Grandfather         unspecified cancer     Prior to Admission Medications   Prior to Admission Medications   Prescriptions Last Dose Informant Patient Reported? Taking?   SUMAtriptan (IMITREX) 100 MG tablet   Yes No   Sig: Take 100 mg by mouth 2 times daily as needed    albuterol (PROAIR HFA/PROVENTIL HFA/VENTOLIN HFA) 108 (90 Base) MCG/ACT Inhaler   Yes No   Sig: Inhale 1-2 puffs into the lungs every 6 hours as needed    amLODIPine (NORVASC) 5 MG tablet   No No   Sig: Take 1 tablet (5 mg) by mouth daily Annual appt due in cardiology   aspirin (ASA) 81 MG EC tablet   No No   Sig: Take 1 tablet (81 mg) by mouth daily   atorvastatin (LIPITOR) 20 MG tablet   Yes No   Sig: Take 20 mg by mouth At Bedtime    clopidogrel (PLAVIX) 75 MG tablet   No No   Sig: Take 1 tablet (75 mg) by mouth daily   diphenhydrAMINE-acetaminophen (TYLENOL PM)  MG tablet   Yes No   Sig: Take 2 tablets by mouth nightly as needed    fentaNYL (DURAGESIC) 50 mcg/hr 72 hr patch   Yes No   Sig: Place 1 patch onto the skin every 72 hours    ferrous sulfate (FE TABS) 325 (65 Fe) MG EC tablet   Yes No   Sig: Take 325 mg by mouth daily   omeprazole (PRILOSEC) 40 MG capsule   No No   Sig: Take 1 capsule (40 mg) by mouth 2 times daily Take 30-60 minutes before a meal.   oxyCODONE-acetaminophen (PERCOCET)  MG per tablet   Yes No   Sig: Take 1 tablet by mouth every 4 hours as needed    pregabalin (LYRICA) 100 MG capsule   Yes No   Sig: Take 100 mg by mouth 4 times daily   prochlorperazine (COMPAZINE) 10 MG tablet   Yes No   Sig: Take 10 mg by mouth every 8 hours as needed for nausea or vomiting   senna-docusate (SENOKOT-S/PERICOLACE) 8.6-50 MG tablet   Yes No   Sig: Take 1 tablet by mouth daily as needed for constipation   traZODone  (DESYREL) 100 MG tablet   Yes No   Sig: Take 100 mg by mouth At Bedtime      Facility-Administered Medications: None     Allergies   Allergies   Allergen Reactions     Codeine Hives and Itching     Methadone      Physical Exam   Vital Signs: Temp: 97.8  F (36.6  C) Temp src: Oral BP: 93/64 Pulse: 106   Resp: 18 SpO2: 98 % O2 Device: None (Room air)    Weight: 156 lbs 4.9 oz    Constitutional: sitting up comfortably in bed, awake, alert, cooperative, no apparent distress, and appears stated age  Eyes: Lids and lashes normal, extra ocular muscles intact, sclera clear, conjunctiva normal  ENT: Normocephalic, without obvious abnormality, atraumatic, oral pharynx with slightly dry mucous membranes.  Hematologic / Lymphatic: no cervical lymphadenopathy and no supraclavicular lymphadenopathy  Respiratory: No increased work of breathing, good air exchange, clear to auscultation bilaterally, no crackles or wheezing  Cardiovascular: normal rate and irregularly, irregular rhythm, and systolic murmur noted. No lower extremity edema.  GI: No scars, normal bowel sounds, soft, non-distended, non-tender  Genitounirinary: deferred  Skin: normal skin color, texture, turgor  Musculoskeletal: Normal bulk and tone. Moves all 4 extremities appropriately.  Neurologic: Awake, alert, oriented to name, place and time.  Neuropsychiatric: calm, pleasant, cooperative, appropriate thought process/content    Data   Data reviewed today: I reviewed all medications, new labs and imaging results over the last 24 hours. I personally reviewed no images or EKG's today.    Outside Facility Labs from today at ~11AM:  Hemoglobin 8.6, platelet count 156, MCV 86, RDW 18.7, WBC 7.9.   Creatinine 0.9, BUN 11, sodium 139, potassium 4.1, cloride 104, CO2 24, anion gap 11, glucose 110, calcium 7.7, magnesium 1.8,   lactic 1.9. VBG unremarkable.    Recent Labs   Lab 10/24/19  0518 10/23/19  1850 10/23/19  1047 10/23/19  0546   WBC 6.7  --  5.8 7.1   HGB 7.9* 7.5*  7.0* 8.6*   MCV 88  --  90 89   *  --  117* 140*   INR  --   --   --  1.09     --  140 139   POTASSIUM 4.4  --  4.4 4.0   CHLORIDE 108  --  108 108   CO2 25  --  24 24   BUN 10  --  13 14   CR 0.67  --  0.82 0.86   ANIONGAP 6  --  8 7   SUZY 7.9*  --  7.4* 8.1*   GLC 88  --  76 82   ALBUMIN  --   --  2.7* 3.3*   PROTTOTAL  --   --  5.0* 6.3*   BILITOTAL  --   --  0.3 0.4   ALKPHOS  --   --  59 68   ALT  --   --  12 16   AST  --   --  13 13     No results found for this or any previous visit (from the past 24 hour(s)).

## 2019-10-30 ENCOUNTER — ANESTHESIA EVENT (OUTPATIENT)
Dept: GASTROENTEROLOGY | Facility: CLINIC | Age: 69
DRG: 378 | End: 2019-10-30
Payer: MEDICARE

## 2019-10-30 ENCOUNTER — ANESTHESIA (OUTPATIENT)
Dept: GASTROENTEROLOGY | Facility: CLINIC | Age: 69
DRG: 378 | End: 2019-10-30
Payer: MEDICARE

## 2019-10-30 PROBLEM — K92.2 ACUTE UPPER GI BLEED: Status: ACTIVE | Noted: 2019-10-30

## 2019-10-30 LAB
ABO + RH BLD: NORMAL
ABO + RH BLD: NORMAL
ANION GAP SERPL CALCULATED.3IONS-SCNC: 7 MMOL/L (ref 3–14)
BLD GP AB SCN SERPL QL: NORMAL
BLD PROD TYP BPU: NORMAL
BLD PROD TYP BPU: NORMAL
BLD UNIT ID BPU: 0
BLOOD BANK CMNT PATIENT-IMP: NORMAL
BLOOD PRODUCT CODE: NORMAL
BPU ID: NORMAL
BUN SERPL-MCNC: 8 MG/DL (ref 7–30)
CALCIUM SERPL-MCNC: 7.6 MG/DL (ref 8.5–10.1)
CHLORIDE SERPL-SCNC: 110 MMOL/L (ref 94–109)
CO2 SERPL-SCNC: 24 MMOL/L (ref 20–32)
CREAT SERPL-MCNC: 0.67 MG/DL (ref 0.66–1.25)
ERYTHROCYTE [DISTWIDTH] IN BLOOD BY AUTOMATED COUNT: 18.3 % (ref 10–15)
GFR SERPL CREATININE-BSD FRML MDRD: >90 ML/MIN/{1.73_M2}
GLUCOSE SERPL-MCNC: 82 MG/DL (ref 70–99)
HCT VFR BLD AUTO: 24.6 % (ref 40–53)
HGB BLD-MCNC: 7.2 G/DL (ref 13.3–17.7)
HGB BLD-MCNC: 7.2 G/DL (ref 13.3–17.7)
HGB BLD-MCNC: 7.5 G/DL (ref 13.3–17.7)
HGB BLD-MCNC: 8 G/DL (ref 13.3–17.7)
MCH RBC QN AUTO: 25.3 PG (ref 26.5–33)
MCHC RBC AUTO-ENTMCNC: 29.3 G/DL (ref 31.5–36.5)
MCV RBC AUTO: 86 FL (ref 78–100)
NUM BPU REQUESTED: 1
PLATELET # BLD AUTO: 141 10E9/L (ref 150–450)
POTASSIUM SERPL-SCNC: 3.7 MMOL/L (ref 3.4–5.3)
RBC # BLD AUTO: 2.85 10E12/L (ref 4.4–5.9)
SODIUM SERPL-SCNC: 141 MMOL/L (ref 133–144)
SPECIMEN EXP DATE BLD: NORMAL
TRANSFUSION STATUS PATIENT QL: NORMAL
TRANSFUSION STATUS PATIENT QL: NORMAL
UPPER GI ENDOSCOPY: NORMAL
WBC # BLD AUTO: 5.9 10E9/L (ref 4–11)

## 2019-10-30 PROCEDURE — 25000128 H RX IP 250 OP 636: Performed by: NURSE ANESTHETIST, CERTIFIED REGISTERED

## 2019-10-30 PROCEDURE — 96361 HYDRATE IV INFUSION ADD-ON: CPT

## 2019-10-30 PROCEDURE — 96376 TX/PRO/DX INJ SAME DRUG ADON: CPT

## 2019-10-30 PROCEDURE — C9113 INJ PANTOPRAZOLE SODIUM, VIA: HCPCS | Performed by: PHYSICIAN ASSISTANT

## 2019-10-30 PROCEDURE — 86850 RBC ANTIBODY SCREEN: CPT | Performed by: FAMILY MEDICINE

## 2019-10-30 PROCEDURE — 25000128 H RX IP 250 OP 636: Performed by: SURGERY

## 2019-10-30 PROCEDURE — 12000000 ZZH R&B MED SURG/OB

## 2019-10-30 PROCEDURE — 25000132 ZZH RX MED GY IP 250 OP 250 PS 637: Performed by: PHYSICIAN ASSISTANT

## 2019-10-30 PROCEDURE — 99232 SBSQ HOSP IP/OBS MODERATE 35: CPT | Performed by: FAMILY MEDICINE

## 2019-10-30 PROCEDURE — 25000125 ZZHC RX 250: Performed by: SURGERY

## 2019-10-30 PROCEDURE — 25000128 H RX IP 250 OP 636: Performed by: FAMILY MEDICINE

## 2019-10-30 PROCEDURE — 80048 BASIC METABOLIC PNL TOTAL CA: CPT | Performed by: PHYSICIAN ASSISTANT

## 2019-10-30 PROCEDURE — P9016 RBC LEUKOCYTES REDUCED: HCPCS | Performed by: FAMILY MEDICINE

## 2019-10-30 PROCEDURE — 88305 TISSUE EXAM BY PATHOLOGIST: CPT | Mod: 26 | Performed by: SURGERY

## 2019-10-30 PROCEDURE — 85018 HEMOGLOBIN: CPT | Performed by: PHYSICIAN ASSISTANT

## 2019-10-30 PROCEDURE — 25000132 ZZH RX MED GY IP 250 OP 250 PS 637: Performed by: SURGERY

## 2019-10-30 PROCEDURE — 0DB68ZX EXCISION OF STOMACH, VIA NATURAL OR ARTIFICIAL OPENING ENDOSCOPIC, DIAGNOSTIC: ICD-10-PCS | Performed by: SURGERY

## 2019-10-30 PROCEDURE — 25000125 ZZHC RX 250: Performed by: NURSE ANESTHETIST, CERTIFIED REGISTERED

## 2019-10-30 PROCEDURE — 25000128 H RX IP 250 OP 636: Performed by: PHYSICIAN ASSISTANT

## 2019-10-30 PROCEDURE — 86900 BLOOD TYPING SEROLOGIC ABO: CPT | Performed by: FAMILY MEDICINE

## 2019-10-30 PROCEDURE — 86901 BLOOD TYPING SEROLOGIC RH(D): CPT | Performed by: FAMILY MEDICINE

## 2019-10-30 PROCEDURE — 43239 EGD BIOPSY SINGLE/MULTIPLE: CPT | Performed by: SURGERY

## 2019-10-30 PROCEDURE — 85027 COMPLETE CBC AUTOMATED: CPT | Performed by: PHYSICIAN ASSISTANT

## 2019-10-30 PROCEDURE — C9113 INJ PANTOPRAZOLE SODIUM, VIA: HCPCS | Performed by: SURGERY

## 2019-10-30 PROCEDURE — 85018 HEMOGLOBIN: CPT | Performed by: SURGERY

## 2019-10-30 PROCEDURE — 86923 COMPATIBILITY TEST ELECTRIC: CPT | Performed by: FAMILY MEDICINE

## 2019-10-30 PROCEDURE — 36415 COLL VENOUS BLD VENIPUNCTURE: CPT | Performed by: PHYSICIAN ASSISTANT

## 2019-10-30 PROCEDURE — 88305 TISSUE EXAM BY PATHOLOGIST: CPT | Performed by: SURGERY

## 2019-10-30 PROCEDURE — 36415 COLL VENOUS BLD VENIPUNCTURE: CPT | Performed by: SURGERY

## 2019-10-30 PROCEDURE — 96375 TX/PRO/DX INJ NEW DRUG ADDON: CPT

## 2019-10-30 PROCEDURE — 37000008 ZZH ANESTHESIA TECHNICAL FEE, 1ST 30 MIN: Performed by: SURGERY

## 2019-10-30 PROCEDURE — G0378 HOSPITAL OBSERVATION PER HR: HCPCS

## 2019-10-30 RX ORDER — PROPOFOL 10 MG/ML
INJECTION, EMULSION INTRAVENOUS PRN
Status: DISCONTINUED | OUTPATIENT
Start: 2019-10-30 | End: 2019-10-30

## 2019-10-30 RX ORDER — SUCRALFATE 1 G/1
1 TABLET ORAL
Status: DISCONTINUED | OUTPATIENT
Start: 2019-10-30 | End: 2019-10-31 | Stop reason: HOSPADM

## 2019-10-30 RX ORDER — MEPERIDINE HYDROCHLORIDE 25 MG/ML
12.5 INJECTION INTRAMUSCULAR; INTRAVENOUS; SUBCUTANEOUS EVERY 5 MIN PRN
Status: DISCONTINUED | OUTPATIENT
Start: 2019-10-30 | End: 2019-10-30 | Stop reason: CLARIF

## 2019-10-30 RX ORDER — LIDOCAINE 40 MG/G
CREAM TOPICAL
Status: DISCONTINUED | OUTPATIENT
Start: 2019-10-30 | End: 2019-10-30 | Stop reason: HOSPADM

## 2019-10-30 RX ORDER — FENTANYL CITRATE 50 UG/ML
25-50 INJECTION, SOLUTION INTRAMUSCULAR; INTRAVENOUS
Status: DISCONTINUED | OUTPATIENT
Start: 2019-10-30 | End: 2019-10-30 | Stop reason: CLARIF

## 2019-10-30 RX ORDER — SODIUM CHLORIDE, SODIUM LACTATE, POTASSIUM CHLORIDE, CALCIUM CHLORIDE 600; 310; 30; 20 MG/100ML; MG/100ML; MG/100ML; MG/100ML
INJECTION, SOLUTION INTRAVENOUS CONTINUOUS
Status: DISCONTINUED | OUTPATIENT
Start: 2019-10-30 | End: 2019-10-30

## 2019-10-30 RX ORDER — ALBUTEROL SULFATE 0.83 MG/ML
2.5 SOLUTION RESPIRATORY (INHALATION) EVERY 4 HOURS PRN
Status: DISCONTINUED | OUTPATIENT
Start: 2019-10-30 | End: 2019-10-30 | Stop reason: CLARIF

## 2019-10-30 RX ORDER — SIMETHICONE 40MG/0.6ML
SUSPENSION, DROPS(FINAL DOSAGE FORM)(ML) ORAL PRN
Status: DISCONTINUED | OUTPATIENT
Start: 2019-10-30 | End: 2019-10-30 | Stop reason: HOSPADM

## 2019-10-30 RX ORDER — ONDANSETRON 2 MG/ML
4 INJECTION INTRAMUSCULAR; INTRAVENOUS EVERY 30 MIN PRN
Status: DISCONTINUED | OUTPATIENT
Start: 2019-10-30 | End: 2019-10-30

## 2019-10-30 RX ORDER — GLYCOPYRROLATE 0.2 MG/ML
INJECTION, SOLUTION INTRAMUSCULAR; INTRAVENOUS PRN
Status: DISCONTINUED | OUTPATIENT
Start: 2019-10-30 | End: 2019-10-30

## 2019-10-30 RX ORDER — METOCLOPRAMIDE HYDROCHLORIDE 5 MG/ML
5 INJECTION INTRAMUSCULAR; INTRAVENOUS EVERY 6 HOURS PRN
Status: DISCONTINUED | OUTPATIENT
Start: 2019-10-30 | End: 2019-10-31 | Stop reason: HOSPADM

## 2019-10-30 RX ORDER — NALOXONE HYDROCHLORIDE 0.4 MG/ML
.1-.4 INJECTION, SOLUTION INTRAMUSCULAR; INTRAVENOUS; SUBCUTANEOUS
Status: DISCONTINUED | OUTPATIENT
Start: 2019-10-30 | End: 2019-10-30 | Stop reason: CLARIF

## 2019-10-30 RX ORDER — FENTANYL 50 UG/1
50 PATCH TRANSDERMAL
Status: DISCONTINUED | OUTPATIENT
Start: 2019-11-01 | End: 2019-10-31 | Stop reason: HOSPADM

## 2019-10-30 RX ORDER — PROPOFOL 10 MG/ML
INJECTION, EMULSION INTRAVENOUS CONTINUOUS PRN
Status: DISCONTINUED | OUTPATIENT
Start: 2019-10-30 | End: 2019-10-30

## 2019-10-30 RX ORDER — SODIUM CHLORIDE, SODIUM LACTATE, POTASSIUM CHLORIDE, CALCIUM CHLORIDE 600; 310; 30; 20 MG/100ML; MG/100ML; MG/100ML; MG/100ML
INJECTION, SOLUTION INTRAVENOUS CONTINUOUS
Status: DISCONTINUED | OUTPATIENT
Start: 2019-10-30 | End: 2019-10-30 | Stop reason: HOSPADM

## 2019-10-30 RX ORDER — LABETALOL 20 MG/4 ML (5 MG/ML) INTRAVENOUS SYRINGE
10
Status: DISCONTINUED | OUTPATIENT
Start: 2019-10-30 | End: 2019-10-30 | Stop reason: CLARIF

## 2019-10-30 RX ORDER — ONDANSETRON 2 MG/ML
4 INJECTION INTRAMUSCULAR; INTRAVENOUS ONCE
Status: COMPLETED | OUTPATIENT
Start: 2019-10-30 | End: 2019-10-30

## 2019-10-30 RX ORDER — ONDANSETRON 4 MG/1
4 TABLET, ORALLY DISINTEGRATING ORAL EVERY 30 MIN PRN
Status: DISCONTINUED | OUTPATIENT
Start: 2019-10-30 | End: 2019-10-30

## 2019-10-30 RX ORDER — METOCLOPRAMIDE 5 MG/1
5 TABLET ORAL EVERY 6 HOURS PRN
Status: DISCONTINUED | OUTPATIENT
Start: 2019-10-30 | End: 2019-10-31 | Stop reason: HOSPADM

## 2019-10-30 RX ORDER — DEXAMETHASONE SODIUM PHOSPHATE 4 MG/ML
4 INJECTION, SOLUTION INTRA-ARTICULAR; INTRALESIONAL; INTRAMUSCULAR; INTRAVENOUS; SOFT TISSUE
Status: DISCONTINUED | OUTPATIENT
Start: 2019-10-30 | End: 2019-10-30 | Stop reason: CLARIF

## 2019-10-30 RX ADMIN — ONDANSETRON 4 MG: 2 INJECTION INTRAMUSCULAR; INTRAVENOUS at 09:22

## 2019-10-30 RX ADMIN — PREGABALIN 100 MG: 100 CAPSULE ORAL at 15:51

## 2019-10-30 RX ADMIN — PREGABALIN 100 MG: 100 CAPSULE ORAL at 01:46

## 2019-10-30 RX ADMIN — FAMOTIDINE 20 MG: 20 INJECTION, SOLUTION INTRAVENOUS at 09:35

## 2019-10-30 RX ADMIN — SUCRALFATE 1 G: 1 TABLET ORAL at 12:00

## 2019-10-30 RX ADMIN — ONDANSETRON 4 MG: 2 INJECTION INTRAMUSCULAR; INTRAVENOUS at 10:13

## 2019-10-30 RX ADMIN — ACETAMINOPHEN: 325 TABLET, FILM COATED ORAL at 18:02

## 2019-10-30 RX ADMIN — OXYCODONE AND ACETAMINOPHEN 1 TABLET: 10; 325 TABLET ORAL at 08:52

## 2019-10-30 RX ADMIN — ATORVASTATIN CALCIUM 20 MG: 20 TABLET, FILM COATED ORAL at 22:31

## 2019-10-30 RX ADMIN — ASPIRIN 81 MG: 81 TABLET, COATED ORAL at 12:00

## 2019-10-30 RX ADMIN — TRAZODONE HYDROCHLORIDE 100 MG: 100 TABLET ORAL at 22:31

## 2019-10-30 RX ADMIN — OXYCODONE AND ACETAMINOPHEN 1 TABLET: 10; 325 TABLET ORAL at 12:48

## 2019-10-30 RX ADMIN — PROPOFOL 125 MCG/KG/MIN: 10 INJECTION, EMULSION INTRAVENOUS at 10:46

## 2019-10-30 RX ADMIN — PANTOPRAZOLE SODIUM 40 MG: 40 INJECTION, POWDER, FOR SOLUTION INTRAVENOUS at 06:14

## 2019-10-30 RX ADMIN — ONDANSETRON 4 MG: 4 TABLET, ORALLY DISINTEGRATING ORAL at 18:09

## 2019-10-30 RX ADMIN — PANTOPRAZOLE SODIUM 40 MG: 40 INJECTION, POWDER, FOR SOLUTION INTRAVENOUS at 18:02

## 2019-10-30 RX ADMIN — FAMOTIDINE 20 MG: 20 INJECTION, SOLUTION INTRAVENOUS at 22:31

## 2019-10-30 RX ADMIN — FERROUS SULFATE TAB 325 MG (65 MG ELEMENTAL FE) 325 MG: 325 (65 FE) TAB at 12:00

## 2019-10-30 RX ADMIN — SUCRALFATE 1 G: 1 TABLET ORAL at 22:31

## 2019-10-30 RX ADMIN — PREGABALIN 100 MG: 100 CAPSULE ORAL at 18:02

## 2019-10-30 RX ADMIN — GLYCOPYRROLATE 0.2 MG: 0.2 INJECTION, SOLUTION INTRAMUSCULAR; INTRAVENOUS at 10:42

## 2019-10-30 RX ADMIN — NICOTINE 1 PATCH: 7 PATCH TRANSDERMAL at 12:01

## 2019-10-30 RX ADMIN — SUCRALFATE 1 G: 1 TABLET ORAL at 15:51

## 2019-10-30 RX ADMIN — PREGABALIN 100 MG: 100 CAPSULE ORAL at 12:00

## 2019-10-30 RX ADMIN — PROPOFOL 50 MG: 10 INJECTION, EMULSION INTRAVENOUS at 10:46

## 2019-10-30 ASSESSMENT — ACTIVITIES OF DAILY LIVING (ADL)
ADLS_ACUITY_SCORE: 13

## 2019-10-30 ASSESSMENT — LIFESTYLE VARIABLES: TOBACCO_USE: 1

## 2019-10-30 ASSESSMENT — MIFFLIN-ST. JEOR
SCORE: 1480.25
SCORE: 1478.4

## 2019-10-30 NOTE — PLAN OF CARE
Patient has not had any bloody emesis today. Denies abdominal discomfort. The only complaints he has is the chronic back pain and being hungry and wants to eat. Plan is to give 1 unit of blood today and monitor patient. EGD results are in the chart. Dr. Tyler paged out to review them as patient will want to eat upon return to the unit.

## 2019-10-30 NOTE — ANESTHESIA CARE TRANSFER NOTE
Patient: Migel Stringer    Procedure(s):  ESOPHAGOGASTRODUODENOSCOPY, WITH BIOPSY    Diagnosis: Upper GI bleed [K92.2]  Diagnosis Additional Information: No value filed.    Anesthesia Type:   MAC     Note:  Airway :Nasal Cannula  Patient transferred to:Phase II  Handoff Report: Identifed the Patient, Identified the Reponsible Provider, Reviewed the pertinent medical history, Discussed the surgical course, Reviewed Intra-OP anesthesia mangement and issues during anesthesia, Set expectations for post-procedure period and Allowed opportunity for questions and acknowledgement of understanding      Vitals: (Last set prior to Anesthesia Care Transfer)    CRNA VITALS  10/30/2019 1028 - 10/30/2019 1101      10/30/2019             Pulse:  93    Ht Rate:  82    SpO2:  98 %                Electronically Signed By: Kameron Gilbert CRNA, JUAN CARLOS CERRATO  October 30, 2019  11:01 AM

## 2019-10-30 NOTE — PROGRESS NOTES
[facility] TRANSPORT NOTE  Data:   Reason for Transport:  Post EGD    Migel Stringer was transported to M/S via wheel chair at 1128.  Patient was accompanied by Nursing Assistant. Equipment used for transport: None. Family was aware of reason for transport: yes    Action:  Report: received from Chad KNIGHT    Response:  Patient's condition upon return was stable.    Kavitha Mendoza RN

## 2019-10-30 NOTE — PROGRESS NOTES
[facility] Hospitalist Progress Note           Assessment & Plan        Migel Stringer is a 69 year old male admitted on 10/29/2019. He has history of recent TAVR 10/23/2019, Robel-en-Y with previous anastomotic site ulcer, iron deficiency anemia, chronic atrial fibrillation s/p watchman, coronary artery disease, chronic low back pain, neuropathy, hypertension and ischemic stroke. He presents following hematemesis.      Upper GI Bleed with acute blood loss anemia on top of Chronic Iron Deficiency Anemia in patient with history of recurrent ulcers and prior Robel-en-Y  10/29/19 -- 4 episodes of dark red to coffee ground hematemesis on 10/28/19. No associated symptoms. Recently restarted home aspirin and started Plavix following TAVR on 10/23/19. As below history of ulcer near GJ site of Robel-en-Y bypass anastomosis site. Vital signs stable. Initial blood pressure mildly low and HR mildly tachycardic, repeat improved. Hemoglobin at outside hospital 8.6, improved from 7.9 from TAVR discharge. Suspect recurrent GI bleed, may be due recurrence of previous GJ ulcer vs gastritis exacerbated by initiation of dual anti-platelet therapy.  - IV pantoprazole Q12 hours  - Clear liquid diet   - NPO at midnight   - LR at 75 ml/hr   - hemoglobin Q6 hours  - hold home Plavix  - surgery consult for EGD  10/30/2019 -- with hemoglobin 7.2, recent grossly bloody emesis, history of recurrent ulcers and plan from cardiology to remain on aspirin if able along with continued weakness we'll go ahead and transfuse 1u RBCs today and continue to follow every 6 hours hemoglobins.  EGD this AM.  Will need to be inpatient for at least another night, perhaps longer depending on response to above.  Continue probonix, adding IV pepcid and oral carafate today.       History of Robel-en-Y with recurrent GI bleed due to ulcer near anastomosis site  10/29/19 -- History of Robel-en-Y in 2011 which has been complicated by recurrent upper GI bleeding  Patient said her blood pressure was taken today and it was 160-108.   Please call her at [phone] due to a gastrojejunal ulcer near the anastomosis site. Appears to have undergone subsequent dilation and stenting due to stricture as well.   - work up for GI bleed as above  - hold home omeprazole while receiving IV PPI as above  10/30/2019 -- treatment as above.      Chronic iron deficiency anemia   Baseline of 8.3 - 12.1 in the past year. Recent work up showed iron deficiency.  - continue home ferrous sulfate      Severe Aortic Stenosis s/p TAVR 10/23/2019  10/29/19 -- Underwent TAVR at [facility], bioprosthetic valve placed. No significant complications identified at time of discharge on 10/24/19. No beta blocker due to HR of 60. Will need dental PPX.   - continue home aspirin  - hold home Plavix  10/30/2019 -- discussed with TAVR team from [facility] - recommended stopping plavix x 1 week, try to continue aspirin if able - if does well with this can continue previous plan for cardiac rehabilitation and just follow-up with primary care provider and with cardiology prn.         Chronic Atrial Fibrillation s/p watchman 2/2019  Underwent watchman due to history of GI bleeding. Adequate rate control without medications.     Coronary Artery Disease, mild non-obstructive  Noted during TAVR angiogram, 50% lesion in RCA.  - continue home ASA, statin     Chronic Shortness of Breath  10/29/19 -- Reportedly over the past 1.5 years. Recently underwent TAVR for severe aortic stenosis. Reportedly previously saw a pulmonologist and underwent some sort of pulmonary testing though no notes on file. Long time smoker trying to quit entirely.   - consider further work up as outpatient  - continue plan for cardiac rehab following TAVR  10/30/2019 -- will see if this improves some with transfusion and having had recent TAVR as above, but overall this sounds like it's at long-standing baseline - not acutely worse.  Follow-up with primary care provider.       Chronic Low Back Pain  RLE Neuropathy  Chronic. Stable - continue home pregabalin and  "Percocet      Hypertension  10/29/19 -- Chronic. Blood pressures reviewed, SBP 93 on presentation.   - hold home amlodipine due to soft pressures   10/30/2019 -- blood pressure low-normal today, likely resume amlodipine tomorrow if blood pressure remains stable.      History of ischemic stroke  Lacunar infarcts occurred 8/2018, presumed cardiogenic. No residual deficits.   - continue home statin and aspirin  - hold home Plavix with GI bleed as above     Diet: Clear liquid diet, NPO at midnight    DVT Prophylaxis: Low Risk/Ambulatory with no VTE prophylaxis indicated    Flores Catheter: not present    Code Status: Full code    Prophylaxis  mechanical    Lines  PIV              Disposition  Complex situation with acute blood loss anemia and high suspicion for upper GI bleed in patient needing to continue aspirin - getting blood today, needs at least a second night inpatient, perhaps longer depending on stability of hemoglobin - overall I think inpatient status is more appropriate here, transitioned today.              Interval History:   No new concerns.  Still feels weak but sounds like this is unchanged over the past 1-2 years.  No pain including no chest pain.  No edema.  No vomiting since admission, no stools since admission.  Confirmed clearly bloody emesis, but no clearly black or bloody stools that patient is aware of.  No other changes.               Review of Systems:    ROS: 10 point ROS neg other than the symptoms noted above in the HPI.             Medications:   Current active medications and PTA medications reviewed, see medication list for details.            Physical Exam:   Vitals were reviewed  Patient Vitals for the past 24 hrs:   BP Temp Temp src Pulse Resp SpO2 Height Weight   10/30/19 1006 110/74 98.7  F (37.1  C) Oral 87 18 95 % 1.778 m (5' 10\") 71.2 kg (157 lb)   10/30/19 0734 127/56 97.8  F (36.6  C) Oral 84 18 97 % -- --   10/30/19 0500 -- -- -- -- -- -- -- 71.4 kg (157 lb 6.5 oz)   10/30/19 " "0427 119/65 98  F (36.7  C) Oral 88 18 97 % -- --   10/29/19 2305 98/57 97.7  F (36.5  C) Oral 85 18 95 % -- --   10/29/19 2240 -- -- -- -- 18 -- -- --   10/29/19 1919 97/58 97.5  F (36.4  C) Oral 81 18 98 % -- --   10/29/19 1610 -- -- -- -- -- 95 % -- --   10/29/19 1545 103/61 -- -- 91 18 -- -- --   10/29/19 1529 93/64 97.8  F (36.6  C) Oral 106 18 98 % 1.778 m (5' 10\") 70.9 kg (156 lb 4.9 oz)       Temperatures:  Current - Temp: 98.7  F (37.1  C); Max - Temp  Av.9  F (36.6  C)  Min: 97.5  F (36.4  C)  Max: 98.7  F (37.1  C)  Respiration range: Resp  Av  Min: 18  Max: 18  Pulse range: Pulse  Av.9  Min: 81  Max: 106  Blood pressure range: Systolic (24hrs), Av , Min:93 , Max:127   ; Diastolic (24hrs), Av, Min:56, Max:74    Pulse oximetry range: SpO2  Av.4 %  Min: 95 %  Max: 98 %  I/O last 3 completed shifts:  In: 1481 [P.O.:520; I.V.:961]  Out: 1000 [Urine:1000]    Intake/Output Summary (Last 24 hours) at 10/30/2019 1036  Last data filed at 10/30/2019 0935  Gross per 24 hour   Intake 1481 ml   Output 1500 ml   Net -19 ml     EXAM:  General: awake and alert, NAD, oriented x 3  Head: normocephalic  Neck: unremarkable, no lymphadenopathy   HEENT: oropharynx pink and moist    Heart: Regular rate and rhythm, no rubs, or gallops, murmur as expected with recent TAVR  Lungs: clear to auscultation bilaterally with good air movement throughout  Abdomen: soft, non-tender, no masses or organomegaly  Extremities: no edema in lower extremities   Skin unremarkable.               Data:     Results for orders placed or performed during the hospital encounter of 10/29/19 (from the past 24 hour(s))   CBC with platelets   Result Value Ref Range    WBC 7.5 4.0 - 11.0 10e9/L    RBC Count 3.00 (L) 4.4 - 5.9 10e12/L    Hemoglobin 7.5 (L) 13.3 - 17.7 g/dL    Hematocrit 25.8 (L) 40.0 - 53.0 %    MCV 86 78 - 100 fl    MCH 25.0 (L) 26.5 - 33.0 pg    MCHC 29.1 (L) 31.5 - 36.5 g/dL    RDW 18.4 (H) 10.0 - 15.0 %    " Platelet Count 147 (L) 150 - 450 10e9/L   Hemoglobin   Result Value Ref Range    Hemoglobin 7.2 (L) 13.3 - 17.7 g/dL   Basic metabolic panel   Result Value Ref Range    Sodium 141 133 - 144 mmol/L    Potassium 3.7 3.4 - 5.3 mmol/L    Chloride 110 (H) 94 - 109 mmol/L    Carbon Dioxide 24 20 - 32 mmol/L    Anion Gap 7 3 - 14 mmol/L    Glucose 82 70 - 99 mg/dL    Urea Nitrogen 8 7 - 30 mg/dL    Creatinine 0.67 0.66 - 1.25 mg/dL    GFR Estimate >90 >60 mL/min/[1.73_m2]    GFR Estimate If Black >90 >60 mL/min/[1.73_m2]    Calcium 7.6 (L) 8.5 - 10.1 mg/dL   CBC with platelets   Result Value Ref Range    WBC 5.9 4.0 - 11.0 10e9/L    RBC Count 2.85 (L) 4.4 - 5.9 10e12/L    Hemoglobin 7.2 (L) 13.3 - 17.7 g/dL    Hematocrit 24.6 (L) 40.0 - 53.0 %    MCV 86 78 - 100 fl    MCH 25.3 (L) 26.5 - 33.0 pg    MCHC 29.3 (L) 31.5 - 36.5 g/dL    RDW 18.3 (H) 10.0 - 15.0 %    Platelet Count 141 (L) 150 - 450 10e9/L           Attestation:  I have reviewed today's vital signs, notes, medications, labs and imaging.  Amount of time spent in direct patient care: 40 minutes including detailed discussion with patient and with cardiology team.     Masoud Tlyer MD, MD

## 2019-10-30 NOTE — PROGRESS NOTES
"Blood transfusion completed. VS stable. Denying any nausea. Reports ongoing back pain, states he prefers to lay in bed when having this discomfort. /65   Pulse 78   Temp 97.6  F (36.4  C) (Oral)   Resp 16   Ht 1.778 m (5' 10\")   Wt 71.2 kg (157 lb)   SpO2 96%   BMI 22.53 kg/m      "

## 2019-10-30 NOTE — OP NOTE
EGD - several non-bleeding ulcers in the gastric pouch found.  Biopsy taken for H pylori.  Esophagus normal. No active bleeding.

## 2019-10-30 NOTE — PLAN OF CARE
Patient tolerating clear liquids, no nausea/emesis or stool since admit.  Urine clear, bubba. IVF running at 75ml/hr.  Patient given percocet for chronic back pain as requested.  Slept intermittently this shift.

## 2019-10-30 NOTE — CONSULTS
Care Transition Initial Assessment - RN      Met with: Patient.    DATA  Active Problems:    Upper GI bleed    Acute upper GI bleed       Cognitive Status: awake, alert and oriented.  Primary Care Clinic Name: [facility]  Primary Care MD Name: Dr. Dorado  Contact information and PCP information verified: Yes  Lives With: spouse      Quality of Family Relationships: supportive              Insurance concerns: No Insurance issues identified    This writer met with pt, introduced self and role.  Discussed discharge planning and Medicare guidelines in regards to home care, TCU and LTC.  Patient live at home with his wife.  He is the primary caregiver for his wife who has cancer.  Plans to return home at discharge at this time once medically stable.  Patient normally independent in ADL's, is able to drive, etc.    PLAN    CTS to follow should discharge needs arise. Plan to discharge home once medically stable.    Almaz Love, DNP, RN, CNE, PHN  RN Care Coordinator  [facility] [phone]  [facility] [phone]

## 2019-10-30 NOTE — ANESTHESIA POSTPROCEDURE EVALUATION
Patient: Migel Stringer    Procedure(s):  ESOPHAGOGASTRODUODENOSCOPY, WITH BIOPSY    Diagnosis:Upper GI bleed [K92.2]  Diagnosis Additional Information: No value filed.    Anesthesia Type:  MAC    Note:  Anesthesia Post Evaluation    Patient location during evaluation: Phase 2 and Bedside  Patient participation: Able to fully participate in evaluation  Level of consciousness: awake  Pain management: adequate  Airway patency: patent  Cardiovascular status: acceptable  Respiratory status: acceptable  Hydration status: acceptable  PONV: none     Anesthetic complications: None          Last vitals:  Vitals:    10/30/19 0427 10/30/19 0734 10/30/19 1006   BP: 119/65 127/56 110/74   Pulse: 88 84 87   Resp: 18 18 18   Temp: 36.7  C (98  F) 36.6  C (97.8  F) 37.1  C (98.7  F)   SpO2: 97% 97% 95%         Electronically Signed By: Kameron Gilbert CRNA, APRN CRNA  October 30, 2019  11:01 AM

## 2019-10-30 NOTE — PLAN OF CARE
Patient alert and orientated. Vital signs stable.     One unit of blood is currently transfusing. No reaction thus far. No emesis or blood noted this shift. Tolerated regular tray and denied any nausea. PRN percocet given for back pain.

## 2019-10-30 NOTE — PROGRESS NOTES
"[facility] ADMISSION NOTE    Patient admitted to room 2402 at approximately 1550 via ambulance stretcher from [facility] as direct admit. Patient was accompanied by other:emts.     Verbal SBAR report received from charge RN Braulio prior to patient arrival.     Patient ambulated to bed independently. Patient alert and oriented X 3. Pain rated 5/10 chronic back pain reported. Admission vital signs: Blood pressure 98/57, pulse 85, temperature 97.7  F (36.5  C), temperature source Oral, resp. rate 18, height 1.778 m (5' 10\"), weight 70.9 kg (156 lb 4.9 oz), SpO2 95 %. Patient was oriented to plan of care, call light, bed controls, tv, telephone, bathroom and visiting hours.     Risk Assessment    The following safety risks were identified during admission: none. Yellow risk band applied: NO.     Skin Initial Assessment    This writer admitted this patient and completed a full skin assessment and Wayne score in the Adult PCS flowsheet. Appropriate interventions initiated as needed.     Secondary skin check completed by Braulio Black Risk Assessment  Sensory Perception: 4-->no impairment  Moisture: 4-->rarely moist  Activity: 3-->walks occasionally  Mobility: 4-->no limitation  Nutrition: 3-->adequate  Friction and Shear: 3-->no apparent problem  Wayne Score: 21  Bed Support Surface: Atmos Air mattress      Jana Hardy RN    "

## 2019-10-30 NOTE — ANESTHESIA PREPROCEDURE EVALUATION
Anesthesia Pre-Procedure Evaluation    Patient: Migel Stringer   MRN: [id number] : 1950          Preoperative Diagnosis: Upper GI bleed [K92.2]    Procedure(s):  ESOPHAGOGASTRODUODENOSCOPY (EGD)    Past Medical History:   Diagnosis Date     AAA (abdominal aortic aneurysm) (H)      Aortic stenosis      Asthma      Atrial fibrillation (H)      Chronic low back pain      Chronic nausea      GERD (gastroesophageal reflux disease)      Heart murmur      Heart rate problem      History of heparin-induced thrombocytopenia     probably history based on symptoms, repeat titers negative     History of sleep apnea      Hyperlipidemia      Hypertension      Insomnia      Left renal mass      Migraines      Mitral regurgitation      Peripheral neuropathy      Renal mass      Status post gastric bypass for obesity      Ulcer, gastric, acute      Vomiting in adult      Past Surgical History:   Procedure Laterality Date     CHOLECYSTECTOMY       COLONOSCOPY       CV LEFT ATRIAL APPENDAGE CLOSURE N/A 2019    Procedure: Left Atrial Appendage Closure;  Surgeon: Michael Diaz MD;  Location:  HE[facility] CARDI[facility] CATH LAB     CV TRANSCATHETER AORTIC VALVE REPLACEMENT N/A 10/23/2019    Procedure: Femoral Transcatheter  Aortic Valve Replacement with Medtronic Evolut R 34mm Valve, on Cardiopulmonary Bypass Pump Standby, Transthoracic Echocardiogram;  Surgeon: Michael Diaz MD;  Location: [facility] OR     ESOPHAGOGASTRODUODENOSCOPY       GASTRIC BYPASS       HEART CATH FEMORAL CANNULIZATION WITH OPEN STANDBY REPAIR AORTIC VALVE N/A 10/23/2019    Procedure: t;  Surgeon: Theo Ulloa MD;  Location: [facility] OR     HERNIA REPAIR       SPINE SURGERY      hx of 6 lumbar surgeries and 1 thoracic surgery       Anesthesia Evaluation     . Pt has had prior anesthetic. Type: General and MAC           ROS/MED HX    ENT/Pulmonary: Comment: Chronic SOB    (+)sleep apnea, tobacco use, Current use asthma , . .    Neurologic:      (+)neuropathy - peripheral, migraines, CVA date: 2018     Cardiovascular: Comment: TAVR 10-23-19  Watchman device implanted  Cardiac murmur  Mild nonobstructive CAD with 50% lesion RCA  AAA  Heart rate problem  PSVT ablation    (+) Dyslipidemia, hypertension--CAD, --. : . . . :. valvular problems/murmurs type: MR . Previous cardiac testing Echodate:10-23-19results:Interpretation Summary  TAVR with 34 mm Medtronic Evolut R. The mean gradient across the aortic valve  is7 mmHg. Trace para-valvular aortic insufficiency is present.  Global and regional left ventricular function is hyperkinetic with an EF >70%.  There is minimal intra-cavitary gradient.  No pericardial effusion is present.  Right ventricular function, chamber size, wall motion, and thickness are  normal.  IVC diameter and respiratory changes fall into an intermediate range  suggesting an RA pressure of 8 mmHg.  The aorta root is normal.  Compared to yesterday, AI is slightly improved, no other change.Stress Testdate:6-11-18 results:Indication/Clinical History: Dyspnea on exertion     Impression  1. Myocardial perfusion imaging using single isotope technique  demonstrated a fixed basal inferoseptal defect most likely consistent  with attenuation artifact. No ischemia appreciated in present study.   2. Gated images demonstrated no regional wall motion abnormalities.   The left ventricular systolic function is normal with LVEF off 62% at  rest.  3. No prior study for comparison.     Procedure  Pharmacologic stress testing was performed with Lexiscan at a rate of  0.08 mg/ml rapid bolus injection, for 15 seconds, 0.4 mg/5ml  intravenously. Low-level exercise was performed along with the  vasodilator infusion. The heart rate was 62 at baseline and jaime to  94 beats per minute during the Lexiscan infusion. The rest blood  pressure was 140/86 mmHg and was 144/90 mm Hg during Lexiscan  infusion. The patient experienced no chest pain during the  test.     Myocardial perfusion imaging was performed at rest, approximately 45  minutes after the injection intravenously of 11 mCi of Tc-99m Myoview.  At peak pharmacologic effect, 10-20 seconds after Lexiscan,  the  patient was injected intravenously with 33.1 mCi of  Tc-99m Myoview.  The post-stress tomographic imaging was performed approximately 60  minutes after stress.     EKG Findings  The resting EKG demonstrated atrial fibrillation with the nonspecific  ST-T changes with diffuse 1 mm ST depression in inferior and lateral  precordial leads. The stress EKG demonstrated no significant ST-T  changes compared to baseline, stress EKG nondiagnostic due to resting  EKG abnormalities.     Tomographic Findings  Overall, the study quality is adequate . On both stress and rest  images there is mild intensity basal inferoseptal wall photopenic  defect that appear worse on some rest images compared to stress  images. Gated images demonstrated no regional wall motion  abnormalities. The left ventricular ejection fraction was calculated  to be 62% at rest. TID was visually absent.ECG reviewed date:10-23-19 results:Atrial fibrillation  Anteroseptal infarct (cited on or before 30-JUL-2018)  Abnormal ECG  When compared with ECG of 23-OCT-2019 10:40, (unconfirmed)  Questionable change in initial forces of Anterior leads date: results:          METS/Exercise Tolerance:  1 - Eating, dressing   Hematologic: Comments: Hgb = 7.2    (+) Anemia, -      Musculoskeletal: Comment: Chronic LBP  7 spine surgeries        GI/Hepatic: Comment: Status post gastric bypass for obesity  Upper GI bleed  History Robel-en-Y with anastomotic ulcer  Chronic nausea  Chronic gastritis  Colon polyps    (+) GERD       Renal/Genitourinary: Comment: Left renal mass    (+) BPH,       Endo:         Psychiatric: Comment: insomnia    (+) psychiatric history anxiety and depression      Infectious Disease:  - neg infectious disease ROS       Malignancy:      - no  "malignancy   Other: Comment: Vit D deficiency  Controlled substance agreement   (+) H/O Chronic Pain,                        Physical Exam  Normal systems: cardiovascular, pulmonary and dental    Airway   Mallampati: II    Dental   (+) chipped and missing    Cardiovascular       Pulmonary             Lab Results   Component Value Date    WBC 5.9 10/30/2019    HGB 7.2 (L) 10/30/2019    HCT 24.6 (L) 10/30/2019     (L) 10/30/2019    CRP 12.0 (H) 09/11/2018    SED 14 09/11/2018     10/30/2019    POTASSIUM 3.7 10/30/2019    CHLORIDE 110 (H) 10/30/2019    CO2 24 10/30/2019    BUN 8 10/30/2019    CR 0.67 10/30/2019    GLC 82 10/30/2019    SUZY 7.6 (L) 10/30/2019    PHOS 3.6 10/24/2019    MAG 2.0 10/24/2019    ALBUMIN 2.7 (L) 10/23/2019    PROTTOTAL 5.0 (L) 10/23/2019    ALT 12 10/23/2019    AST 13 10/23/2019    ALKPHOS 59 10/23/2019    BILITOTAL 0.3 10/23/2019    PTT 35 09/26/2019    INR 1.09 10/23/2019       Preop Vitals  BP Readings from Last 3 Encounters:   10/30/19 127/56   10/24/19 112/64   09/26/19 105/66    Pulse Readings from Last 3 Encounters:   10/30/19 84   10/23/19 86   09/26/19 86      Resp Readings from Last 3 Encounters:   10/30/19 18   10/24/19 16   09/26/19 20    SpO2 Readings from Last 3 Encounters:   10/30/19 97%   10/24/19 97%   09/26/19 99%      Temp Readings from Last 1 Encounters:   10/30/19 36.6  C (97.8  F) (Oral)    Ht Readings from Last 1 Encounters:   10/29/19 1.778 m (5' 10\")      Wt Readings from Last 1 Encounters:   10/30/19 71.4 kg (157 lb 6.5 oz)    Estimated body mass index is 22.59 kg/m  as calculated from the following:    Height as of this encounter: 1.778 m (5' 10\").    Weight as of this encounter: 71.4 kg (157 lb 6.5 oz).       Anesthesia Plan      History & Physical Review  History and physical reviewed and following examination; no interval change.    ASA Status:  4 emergent.    NPO Status:  > 6 hours    Plan for MAC Reason for MAC:  Deep or markedly invasive procedure " (G8)  PONV prophylaxis:  Ondansetron (or other 5HT-3)  Agrees to MAC, OK with general.      Postoperative Care      Consents  Anesthetic plan, risks, benefits and alternatives discussed with:  Patient..                 Kameron Gilbert CRNA, APRN CRNA

## 2019-10-30 NOTE — PROGRESS NOTES
[facility] TRANSPORT NOTE  Data:   Reason for Transport:  EGD    Migel Stringer was transported to Endo ROOM 8 via wheel chair at 1000.  Patient was accompanied by Nursing Assistant. Equipment used for transport: None. Family was aware of reason for transport: yes    Action:  Report: given to Tran Jeter RN    Response:  Patient's condition when transferred off unit was stable.    Kavitha Mendoza RN

## 2019-10-30 NOTE — PLAN OF CARE
"HGB at midnight, 7.2. Reports \"slightly\" dizzy when initiating getting up from bed. Pt noted to quickly get out of bed as rushing to get into the bathroom.  Pt reminded to sit on edge of bed prior to standing, stand slow and to call for SBA with further wt bearing activity,  pt voices understanding. Next HGB at 0600.   "

## 2019-10-31 ENCOUNTER — HEALTH MAINTENANCE LETTER (OUTPATIENT)
Age: 69
End: 2019-10-31

## 2019-10-31 VITALS
DIASTOLIC BLOOD PRESSURE: 69 MMHG | HEART RATE: 67 BPM | RESPIRATION RATE: 16 BRPM | SYSTOLIC BLOOD PRESSURE: 123 MMHG | WEIGHT: 159.17 LBS | OXYGEN SATURATION: 96 % | BODY MASS INDEX: 22.79 KG/M2 | HEIGHT: 70 IN | TEMPERATURE: 97.6 F

## 2019-10-31 LAB
ANION GAP SERPL CALCULATED.3IONS-SCNC: 7 MMOL/L (ref 3–14)
BUN SERPL-MCNC: 6 MG/DL (ref 7–30)
CALCIUM SERPL-MCNC: 7.7 MG/DL (ref 8.5–10.1)
CHLORIDE SERPL-SCNC: 111 MMOL/L (ref 94–109)
CO2 SERPL-SCNC: 26 MMOL/L (ref 20–32)
CREAT SERPL-MCNC: 0.59 MG/DL (ref 0.66–1.25)
ERYTHROCYTE [DISTWIDTH] IN BLOOD BY AUTOMATED COUNT: 18.3 % (ref 10–15)
GFR SERPL CREATININE-BSD FRML MDRD: >90 ML/MIN/{1.73_M2}
GLUCOSE SERPL-MCNC: 85 MG/DL (ref 70–99)
HCT VFR BLD AUTO: 26.8 % (ref 40–53)
HGB BLD-MCNC: 7.6 G/DL (ref 13.3–17.7)
HGB BLD-MCNC: 8.1 G/DL (ref 13.3–17.7)
MCH RBC QN AUTO: 26.3 PG (ref 26.5–33)
MCHC RBC AUTO-ENTMCNC: 30.2 G/DL (ref 31.5–36.5)
MCV RBC AUTO: 87 FL (ref 78–100)
PLATELET # BLD AUTO: 138 10E9/L (ref 150–450)
POTASSIUM SERPL-SCNC: 3.9 MMOL/L (ref 3.4–5.3)
RBC # BLD AUTO: 3.08 10E12/L (ref 4.4–5.9)
SODIUM SERPL-SCNC: 144 MMOL/L (ref 133–144)
WBC # BLD AUTO: 4.8 10E9/L (ref 4–11)

## 2019-10-31 PROCEDURE — 25000132 ZZH RX MED GY IP 250 OP 250 PS 637: Performed by: SURGERY

## 2019-10-31 PROCEDURE — 99239 HOSP IP/OBS DSCHRG MGMT >30: CPT | Performed by: FAMILY MEDICINE

## 2019-10-31 PROCEDURE — 36415 COLL VENOUS BLD VENIPUNCTURE: CPT | Performed by: SURGERY

## 2019-10-31 PROCEDURE — 85018 HEMOGLOBIN: CPT | Performed by: SURGERY

## 2019-10-31 PROCEDURE — 85027 COMPLETE CBC AUTOMATED: CPT | Performed by: SURGERY

## 2019-10-31 PROCEDURE — 25000128 H RX IP 250 OP 636: Performed by: SURGERY

## 2019-10-31 PROCEDURE — C9113 INJ PANTOPRAZOLE SODIUM, VIA: HCPCS | Performed by: SURGERY

## 2019-10-31 PROCEDURE — 80048 BASIC METABOLIC PNL TOTAL CA: CPT | Performed by: SURGERY

## 2019-10-31 RX ORDER — FAMOTIDINE 20 MG/1
20 TABLET, FILM COATED ORAL 2 TIMES DAILY
Qty: 60 TABLET | Refills: 1 | Status: SHIPPED | OUTPATIENT
Start: 2019-10-31 | End: 2020-01-24

## 2019-10-31 RX ORDER — SUCRALFATE 1 G/1
1 TABLET ORAL
Qty: 120 TABLET | Refills: 0 | Status: SHIPPED | OUTPATIENT
Start: 2019-10-31 | End: 2019-11-30

## 2019-10-31 RX ADMIN — FERROUS SULFATE TAB 325 MG (65 MG ELEMENTAL FE) 325 MG: 325 (65 FE) TAB at 08:17

## 2019-10-31 RX ADMIN — PANTOPRAZOLE SODIUM 40 MG: 40 INJECTION, POWDER, FOR SOLUTION INTRAVENOUS at 05:59

## 2019-10-31 RX ADMIN — PREGABALIN 100 MG: 100 CAPSULE ORAL at 05:58

## 2019-10-31 RX ADMIN — NICOTINE 1 PATCH: 7 PATCH TRANSDERMAL at 06:25

## 2019-10-31 RX ADMIN — SUCRALFATE 1 G: 1 TABLET ORAL at 06:24

## 2019-10-31 RX ADMIN — ASPIRIN 81 MG: 81 TABLET, COATED ORAL at 08:17

## 2019-10-31 ASSESSMENT — ACTIVITIES OF DAILY LIVING (ADL)
ADLS_ACUITY_SCORE: 13

## 2019-10-31 ASSESSMENT — MIFFLIN-ST. JEOR: SCORE: 1488.25

## 2019-10-31 NOTE — PROGRESS NOTES
[facility] DISCHARGE NOTE    Patient discharged to home at 9:48 AM via wheel chair. Accompanied by spouse and staff. Discharge instructions reviewed with patient, opportunity offered to ask questions. Prescriptions sent to patients preferred pharmacy. All belongings sent with patient.    Kavitha Mendoza RN

## 2019-10-31 NOTE — DISCHARGE SUMMARY
[facility] Discharge Summary    Migel Stringer MRN# [id number]   Age: 69 year old YOB: 1950     Date of Admission:  10/29/2019  Date of Discharge::  10/31/2019  Admitting Physician:  Masoud Tyler MD  Discharge Physician:  Masoud Tyler MD, MD             Admission Diagnoses:   Bloody Emesis  Upper GI bleed  Acute upper GI bleed          Principle Discharge Diagnosis:       Upper GI Bleed due to gastric ulcers with acute blood loss anemia due to this on top of Chronic Iron Deficiency Anemia in patient with history of recurrent ulcers and prior Robel-en-Y    See hospital course for further active diagnoses addressed during this admission.            Procedures:   EGD 10/30 showed:    Findings:        The nasopharynx and oropharynx were normal.        The examined esophagus was normal.        There is no endoscopic evidence of Estrada's esophagus, bleeding or        esophagitis in the entire esophagus.        Few non-bleeding cratered gastric ulcers with no stigmata of bleeding        were found in the gastric fundus. Biopsies were taken with a cold        forceps for Helicobacter pylori testing.        The exam of the stomach was otherwise normal.        The examined jejunum was normal.                                                                                    Impression:          - Normal nasopharynx and oropharynx.                        - Normal esophagus.                        - Non-bleeding gastric ulcers with no stigmata of                        bleeding. Biopsied.                        - Normal examined jejunum.           Medications Prior to Admission:     Medications Prior to Admission   Medication Sig Dispense Refill Last Dose     albuterol (PROAIR HFA/PROVENTIL HFA/VENTOLIN HFA) 108 (90 Base) MCG/ACT Inhaler Inhale 1-2 puffs into the lungs every 6 hours as needed    10/29/2019 at am     amLODIPine (NORVASC) 5 MG tablet Take 1 tablet (5 mg) by mouth daily Annual  appt due in cardiology 30 tablet 3 10/29/2019 at am     aspirin (ASA) 81 MG EC tablet Take 1 tablet (81 mg) by mouth daily 30 tablet 3 10/29/2019 at am     atorvastatin (LIPITOR) 20 MG tablet Take 20 mg by mouth At Bedtime    10/28/2019 at hs     diphenhydrAMINE-acetaminophen (TYLENOL PM)  MG tablet Take 2 tablets by mouth At Bedtime    10/28/2019 at hs     fentaNYL (DURAGESIC) 50 mcg/hr 72 hr patch Place 1 patch onto the skin every 72 hours    10/29/2019 at 1200     ferrous sulfate (FE TABS) 325 (65 Fe) MG EC tablet Take 325 mg by mouth daily   10/28/2019 at am     omeprazole (PRILOSEC) 40 MG capsule Take 1 capsule (40 mg) by mouth 2 times daily Take 30-60 minutes before a meal. 60 capsule 11 10/29/2019 at am     oxyCODONE-acetaminophen (PERCOCET)  MG per tablet Take 1 tablet by mouth every 4 hours Six times daily   10/29/2019 at am     pregabalin (LYRICA) 100 MG capsule Take 100 mg by mouth 4 times daily 0400, 1100, 1600, 1800   10/28/2019 at 1100     prochlorperazine (COMPAZINE) 10 MG tablet Take 10 mg by mouth every 8 hours as needed for nausea or vomiting   10/28/2019 at Unknown time     senna-docusate (SENOKOT-S/PERICOLACE) 8.6-50 MG tablet Take 1 tablet by mouth daily as needed for constipation   10/28/2019 at Unknown time     SUMAtriptan (IMITREX) 100 MG tablet Take 100 mg by mouth 2 times daily as needed    Past Month at Unknown time     traZODone (DESYREL) 100 MG tablet Take 100 mg by mouth At Bedtime   10/28/2019 at hs     [DISCONTINUED] clopidogrel (PLAVIX) 75 MG tablet Take 1 tablet (75 mg) by mouth daily 30 tablet 3 10/29/2019 at am             Discharge Medications:     Current Discharge Medication List      START taking these medications    Details   famotidine (PEPCID) 20 MG tablet Take 1 tablet (20 mg) by mouth 2 times daily  Qty: 60 tablet, Refills: 1    Associated Diagnoses: Acute upper GI bleed      sucralfate (CARAFATE) 1 GM tablet Take 1 tablet (1 g) by mouth 4 times daily (before  meals and nightly)  Qty: 120 tablet, Refills: 0    Associated Diagnoses: Acute upper GI bleed         CONTINUE these medications which have NOT CHANGED    Details   albuterol (PROAIR HFA/PROVENTIL HFA/VENTOLIN HFA) 108 (90 Base) MCG/ACT Inhaler Inhale 1-2 puffs into the lungs every 6 hours as needed       amLODIPine (NORVASC) 5 MG tablet Take 1 tablet (5 mg) by mouth daily Annual appt due in cardiology  Qty: 30 tablet, Refills: 3    Associated Diagnoses: Benign essential hypertension      aspirin (ASA) 81 MG EC tablet Take 1 tablet (81 mg) by mouth daily  Qty: 30 tablet, Refills: 3    Associated Diagnoses: S/P TAVR (transcatheter aortic valve replacement)      atorvastatin (LIPITOR) 20 MG tablet Take 20 mg by mouth At Bedtime       diphenhydrAMINE-acetaminophen (TYLENOL PM)  MG tablet Take 2 tablets by mouth At Bedtime       fentaNYL (DURAGESIC) 50 mcg/hr 72 hr patch Place 1 patch onto the skin every 72 hours       ferrous sulfate (FE TABS) 325 (65 Fe) MG EC tablet Take 325 mg by mouth daily      omeprazole (PRILOSEC) 40 MG capsule Take 1 capsule (40 mg) by mouth 2 times daily Take 30-60 minutes before a meal.  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)      oxyCODONE-acetaminophen (PERCOCET)  MG per tablet Take 1 tablet by mouth every 4 hours Six times daily      pregabalin (LYRICA) 100 MG capsule Take 100 mg by mouth 4 times daily 0400, 1100, 1600, 1800      prochlorperazine (COMPAZINE) 10 MG tablet Take 10 mg by mouth every 8 hours as needed for nausea or vomiting      senna-docusate (SENOKOT-S/PERICOLACE) 8.6-50 MG tablet Take 1 tablet by mouth daily as needed for constipation      SUMAtriptan (IMITREX) 100 MG tablet Take 100 mg by mouth 2 times daily as needed       traZODone (DESYREL) 100 MG tablet Take 100 mg by mouth At Bedtime         STOP taking these medications       clopidogrel  "(PLAVIX) 75 MG tablet Comments:   Reason for Stopping:                     Consultations:   Consultation during this admission received from surgery and phone consultation with TAVR team from [facility]          Brief History of Illness:     From Admission H+P:   Migel Stringer is a 69 year old male who presents with hematemesis.     Yesterday around 11 AM he had an episode of emesis which had a dark red tinge to it. He went on to have a total of 4 episodes of blood emesis ultimately appearing coffee-ground in color/texture. He did not have any associated abdominal pain or bloody/black stools. He has not felt lightheaded, dizzy or fatigued. Over the past 1.5 years he has felt short of breath, this has not worsened or changed really in the past week. He denies chest pains.     He underwent a [facility]R at the [facility] on 10/23/19. He was continue on his home aspirin and started on Plavix following the procedure. He does have a history of a previous foster-en-Y and had issues with stricture at the GJ anastomosis site which was treated with dilation and then stent placement in the past. He has had issues with prior upper GI bleeds due to ulcers at that anastomosis site, he estimates about 4 or so. EGDs in 7/2018, 5/2017, 7/0219, 2/2016 showed an ulcer at the anastomosis site. An EGD from 6/2018 showed chronic gastritis.      He further denies fever, chills, myalgias, cough, congestion, rhinorrhea, sore throat,palpitations, chest pain, changes in urination, rash or wounds.            TODAY:     Subjective:  Doing well. No pain.  Strength/energy feels at least improved after transfusion yesterday.  \"pretty good\" today.  No fever or chills.  No nausea or vomiting since admission.  Had a normal bowel movement overnight - not grossly bloody or black. Taking orals well.  Feels ready for discharge.    No pain.       ROS:   ROS: 10 point ROS neg other than the symptoms noted above in the HPI.   /69   Pulse 67   Temp 97.6  F " "(36.4  C) (Oral)   Resp 16   Ht 1.778 m (5' 10\")   Wt 72.2 kg (159 lb 2.8 oz)   SpO2 96%   BMI 22.84 kg/m     EXAM:  General: awake and alert, NAD, oriented x 3  Head: normocephalic  Neck: unremarkable, no lymphadenopathy   HEENT: oropharynx pink and moist    Heart: Regular rate and rhythm, no murmurs, rubs, or gallops  Lungs: clear to auscultation bilaterally with good air movement throughout  Abdomen: soft, non-tender, no masses or organomegaly  Extremities: no edema in lower extremities   Skin unremarkable.            Hospital Course:        iMgel Stringer is a 69 year old male admitted on 10/29/2019. He has history of recent TAVR 10/23/2019, Robel-en-Y with previous anastomotic site ulcer, iron deficiency anemia, chronic atrial fibrillation s/p watchman, coronary artery disease, chronic low back pain, neuropathy, hypertension and ischemic stroke. He presents following hematemesis.      Upper GI Bleed due to gastric ulcers with acute blood loss anemia due to this on top of Chronic Iron Deficiency Anemia in patient with history of recurrent ulcers and prior Robel-en-Y  10/29/19 -- 4 episodes of dark red to coffee ground hematemesis on 10/28/19. No associated symptoms. Recently restarted home aspirin and started Plavix following TAVR on 10/23/19. As below history of ulcer near GJ site of Robel-en-Y bypass anastomosis site. Vital signs stable. Initial blood pressure mildly low and HR mildly tachycardic, repeat improved. Hemoglobin at outside hospital 8.6, improved from 7.9 from TAVR discharge. Suspect recurrent GI bleed, may be due recurrence of previous GJ ulcer vs gastritis exacerbated by initiation of dual anti-platelet therapy.  - IV pantoprazole Q12 hours  - hold home Plavix  - surgery consult for EGD  10/30/2019 -- with hemoglobin 7.2, recent grossly bloody emesis, history of recurrent ulcers and plan from cardiology to remain on aspirin if able along with continued weakness we'll go ahead and " transfuse 1u RBCs today and continue to follow every 6 hours hemoglobins.  EGD this AM.  Will need to be inpatient for at least another night, perhaps longer depending on response to above.  Continue probonix, adding IV pepcid and oral carafate today.    10/31/2019 -- hemoglobin up to 8.1 and steady at that point as expected after 1u RBCs yesterday.  EGD yesterday showed multiple ulcers, but no active bleeding - suspect this was source.  Ok for discharge today on omeprazole twice daily (continued) with famotidine twice daily and carafate added.  Continue iron supplement.  Follow-up with primary care provider within 1 week and recheck hemoglobin, follow-up on H Pylori results and reassess antacid medications at that time.  Plan is to remain off plavix x 1 month but on aspirin, cardiology would like to resume plavix after 1 month if able.  Primary care provider to coordinate this.    Of note, biopsies for HPylori were taken on EGD and are pending at discharge, follow-up with primary care provider for final results.       History of Robel-en-Y with recurrent GI bleed due to ulcer near anastomosis site  10/29/19 -- History of Robel-en-Y in 2011 which has been complicated by recurrent upper GI bleeding due to a gastrojejunal ulcer near the anastomosis site. Appears to have undergone subsequent dilation and stenting due to stricture as well.   - work up for GI bleed as above  - hold home omeprazole while receiving IV PPI as above  10/30/2019 -- treatment as above.      Chronic iron deficiency anemia   Baseline of 8.3 - 12.1 in the past year. Recent work up showed iron deficiency.  - continue home ferrous sulfate      Severe Aortic Stenosis s/p TAVR 10/23/2019  10/29/19 -- Underwent TAVR at [facility], bioprosthetic valve placed. No significant complications identified at time of discharge on 10/24/19. No beta blocker due to HR of 60. Will need dental PPX.   - continue home aspirin  - hold home Plavix  10/31/2019 -- discussed with  TAVR team from [facility] - recommended stopping plavix x 1 month, try to continue aspirin if able,  Hope to resume plavix in 1 month if able per TAVR team.  Follow-up with TAVR team at [facility] in 1 month with echo at that time, they said they will schedule this.          Chronic Atrial Fibrillation s/p watchman 2/2019  Underwent watchman due to history of GI bleeding. Adequate rate control without medications.     Coronary Artery Disease, mild non-obstructive  Noted during TAVR angiogram, 50% lesion in RCA.  - continue home ASA, statin     Chronic Shortness of Breath  10/29/19 -- Reportedly over the past 1.5 years. Recently underwent TAVR for severe aortic stenosis. Reportedly previously saw a pulmonologist and underwent some sort of pulmonary testing though no notes on file. Long time smoker trying to quit entirely.   - consider further work up as outpatient  - continue plan for cardiac rehab following TAVR  10/30/2019 -- will see if this improves some with transfusion and having had recent TAVR as above, but overall this sounds like it's at long-standing baseline - not acutely worse.  Follow-up with primary care provider.    10/31/2019 -- at least somewhat improved after 1u RBCs with hemoglobin improved as above.  Continue to follow-up with primary care provider to assess this is not improving.       Chronic Low Back Pain  RLE Neuropathy  Chronic. Stable - continue home pregabalin and Percocet      Hypertension  Blood pressures reviewed, SBP 93 on presentation.  Initially held amlodipine but blood pressure normalized, continue prior to admission amlodipine on discharge.         History of ischemic stroke  Lacunar infarcts occurred 8/2018, presumed cardiogenic. No residual deficits.   - continue home statin and aspirin  - hold home Plavix with GI bleed as above     Diet: Clear liquid diet, NPO at midnight     DVT Prophylaxis: Low Risk/Ambulatory with no VTE prophylaxis indicated     Flores Catheter: not present     Code  Status: Full code     Prophylaxis  mechanical     Lines  PIV                     Discharge Instructions and Follow-Up:     Discharge diet: Orders Placed This Encounter      Regular Diet Adult       Discharge activity: Activity as tolerated   Discharge follow-up: Follow-up with primary care provider within 1 week - recheck your hemoglobin, follow-up on final H pylori results and discuss plan for your antacids at that time.    Follow-up with your TAVR (cardiology) team in about 1 month with an echocardiogram at that time, they should be calling you to schedule this, recommend calling them if you haven't heard from them by Monday.           Discharge Disposition:     Discharged to home      Attestation:  I have reviewed today's vital signs, notes, medications, labs and imaging.  Amount of time performed on this discharge summary: 45 minutes.    Masoud Tyler MD, MD

## 2019-10-31 NOTE — PLAN OF CARE
"No stool this justin. Nausea has resolved. Took in bowl of soup with many crackers. Up indeply, denies dizziness or lightheadedness.   Reports, \"I feel good, not great, not like a new man. I was told I would feel like a new man.\"    Addendum 1252: Hgb-7.6. KESHA Galdamez updated with no new orders. Next HGB at 0600.   "

## 2019-11-01 DIAGNOSIS — Z95.2 S/P TAVR (TRANSCATHETER AORTIC VALVE REPLACEMENT): Primary | ICD-10-CM

## 2019-11-01 NOTE — PROGRESS NOTES
Date: 11/1/2019    Time of Call: 10:42 AM     Diagnosis:  S/p TAVR     [ TORB ] Ordering provider: Michael Diaz MD  Order:   Echo  CBC and BMP  EKG     Order received by: Nelli Mccallum RN     Follow-up/additional notes:   30 day s/p TAVR follow up orders

## 2019-11-04 DIAGNOSIS — Z95.2 S/P TAVR (TRANSCATHETER AORTIC VALVE REPLACEMENT): Primary | ICD-10-CM

## 2019-11-04 LAB — COPATH REPORT: NORMAL

## 2019-11-04 NOTE — PROGRESS NOTES
Date: 11/4/2019    Time of Call: 1:08 PM     Diagnosis:   S/p TAVR  [ TORB ] Ordering provider: Michael Diaz MD  Order:   Phase 2, cardiac rehab       Order received by: Nelli Mccallum RN     Follow-up/additional notes:

## 2019-11-05 ENCOUNTER — HOSPITAL ENCOUNTER (OUTPATIENT)
Dept: CARDIAC REHAB | Facility: CLINIC | Age: 69
End: 2019-11-05
Attending: PHYSICIAN ASSISTANT
Payer: MEDICARE

## 2019-11-05 ENCOUNTER — TELEPHONE (OUTPATIENT)
Dept: CARDIOLOGY | Facility: CLINIC | Age: 69
End: 2019-11-05

## 2019-11-05 VITALS — WEIGHT: 162.6 LBS | BODY MASS INDEX: 23.28 KG/M2 | HEIGHT: 70 IN

## 2019-11-05 DIAGNOSIS — Z95.2 S/P TAVR (TRANSCATHETER AORTIC VALVE REPLACEMENT): ICD-10-CM

## 2019-11-05 DIAGNOSIS — I35.0 SEVERE AORTIC STENOSIS: ICD-10-CM

## 2019-11-05 DIAGNOSIS — I35.0 SEVERE AORTIC STENOSIS: Primary | ICD-10-CM

## 2019-11-05 PROCEDURE — 40000116 ZZH STATISTIC OP CR VISIT

## 2019-11-05 PROCEDURE — 40000575 ZZH STATISTIC OP CARDIAC VISIT #2

## 2019-11-05 PROCEDURE — 93798 PHYS/QHP OP CAR RHAB W/ECG: CPT

## 2019-11-05 PROCEDURE — 93797 PHYS/QHP OP CAR RHAB WO ECG: CPT | Mod: 59

## 2019-11-05 ASSESSMENT — 6 MINUTE WALK TEST (6MWT)
GENDER SELECTION: MALE
FEMALE CALC: 1536.04
MALE CALC: 1821.93
PREDICTED: 1833.04

## 2019-11-05 ASSESSMENT — MIFFLIN-ST. JEOR: SCORE: 1503.8

## 2019-11-05 NOTE — PROGRESS NOTES
11/05/19 1200   Session   Session Initial Evaluation and Exercise Prescription   Certified through this date 12/04/19   Cardiac Rehab Assessment   Cardiac Rehab Assessment Pt is s/p TAVR pm 10/23 at [facility].  He came today with his wife Ghislaine.  He states he was sedentary prior to procedure.  He has chronic low back pain which limits his physical activity during times of more intense pain.  Pt is a  and works for an on call company.  His schedule is varied, he states he gets a call in the morning that tells him if he has to work or not that day.  He wasn't able to commit to session dates or times due to his work schedule.  He will be calling on days that he is available to attend CR and ask about open times.  He recently quit smoking cigars (3-4 days ago).  He is on Chantix.  Pt is interested on Quit Plan information.  Information was placed in his folder so he can get it on his first group session.  Due to his chronic pain he opted to complete 6 minutes on Arms in place of 6 MWT.    The patient's history and clinical status including hemodynamics and ECG were evaluated. The patient was assessed to be stable and appropriate to begin exercise.   The patient's functional capacity and exercise prescription were determined by the completion of the 6 minutes on Arm ergometer. See results below. The patient was oriented to the program.  Risk factor profile was completed. Goals and objectives were discussed. CV response was WNL. No symptoms, complaints or pain were reported. Good prognosis for reaching goals below. Skilled therapy is necessary in order to monitor CV response to exercise, to provide education on risk factors and behavior change counseling needed to achieve patient's goals.  Plan to progress to 30-40 minutes of exercise prior to discharge from cardiac rehab.  Initial THR of 20-30 beats above RHR; Effort rating of 4-6. Initiate muscle conditioning as appropriate. Provide risk factor education and  behavior change counseling.    General Information   Treatment Diagnosis Valve Replacement   Date of Treatment Diagnosis 10/23/19   Significant Past CV History Chronic AF   Comorbidities Cerebrovascular Disease   Other Medical History Non obstructive CAD, Afib s/p watchman 2/2019, Chronic low back pain, RLE neuropathy, Hx of AAA, chronic iron deficiency anemia, HTN, HLD, Hx of TIA, migraines, GERD and insomnia   Lead up symptoms Progressive LUCIANO lasting for several months..   Hospital Location [facility]   Hospital Discharge Date 10/24/19   Signs and Symptoms Post Hospital Discharge None   Outpatient Cardiac Rehab Start Date 11/05/19   Primary Physician Joel Dorado   Primary Physician Follow Up Advised to schedule appointment   Surgeon Juan Ramirez   Surgeon Follow Up Advised to schedule appointment   Cardiologist TBA   Ejection Fraction 70%   Risk Stratification Low   Summary of Cath Report   Summary of Cath Report No information available   Living and Work Status    Living Arrangements and Social Status house;significant other   Support System Live with an adult;Check-in help as needed   Return to Employment Yes   Occupation    Preventative Medications   CMS recommended medications Ace inhibitors   Fall Risk Screen   Fall screen completed by Cardiac Rehab   Have you fallen 2 or more times in the past year? No   Have you fallen and had an injury in the past year? No   Is patient a fall risk? No   Abuse Screen (yes response referral indicated)   Feels Unsafe at Home or Work/School no   Feels Threatened by Someone no   Does Anyone Try to Keep You From Having Contact with Others or Doing Things Outside Your Home? no   Physical Signs of Abuse Present no   Pain   Patient Currently in Pain Yes   Pain Location low back   Pain Rating 10/10   Pain Description Ache;Throbbing;Pressure;Discomfort;Sharp;Deep   Pain Treatment Recommendations Chronic condition   Physical Assessments   Incisions WNL   Edema None   Right Lung  "Sounds not assessed   Left Lung Sounds not assessed   Limitations Orthopedic   Individualized Treatment Plan   Monitored Sessions Scheduled 24   Monitored Sessions Attended 1   Oxygen   Supplemental Oxygen needed No   Nutrition Management - Weight Management   Assessment Initial Assessment   Age 69   Weight 73.8 kg (162 lb 9.6 oz)   Height 1.778 m (5' 10\")   BMI (Calculated) 23.33   Initial Rate Your Plate Score. Dietary tool to assess eating patterns. Scores range from 24 to 72. The higher the score the healthier the eating pattern. 40   Nutrition Management - Lipids   Lipids Labs Not Available   Prescribed Lipid Medication No   Nutrition Management - Diabetes   Diabetes No   Nutrition Management Summary   Dietary Recommendations None   Stages of Change for Diet Compliance Pre-Contemplation   Interventions Planned Attend Nutrition Education Class(es);Educate on Weight Management Principles;Educate on Benefits of Exercise   Nutrition Target Outcome Total Chol < 150, HDL > 40 (M), HDL > 50 (W), LDL < 70, Trig < 150   Psychosocial Management   Psychosocial Assessment Initial   Is there history of clinical depression or increased risk of depression? No previous history   Current Level of Stress per Patient Report Denies   Current Coping Skills Patient Unable to Identify Personal Coping Skills   Initial Patient Health Questionnaire -9 Score (PHQ-9) for depression. 5-9 Minimal symptoms, 10-14 Minor depression, 15-19 Major depression, moderately severe, > 20 Major depression, severe  9   Initial [facility] Survey score.  Quality of Life:   If total score > 25 review individual areas where patient rated a 4 or 5.  Consider patients current medical condition and what role that plays on the score.   Adjust treatment protocol to improve areas of concern.  Consider the following:  PHQ9 score, DASI, and re-assessment within the next 30 days to assist with developing treatments.  28   Stages of Change Maintenance "   Interventions Planned Patient to verbalize understanding of behavioral assessment results;Patient to verbalize understanding of negative impact of stress to personal health;Patient will recognize signs and symptoms of depression   Patient Goal No   Psychosocial Target Outcome Maximize coping skills   Other Core Components - Hypertension   History of or Diagnosis of Hypertension Yes   Currently taking Anti-Hypertensive's Yes;CCB   Other Core Components - Tobacco   History of Tobacco Use Yes   Quit Date or Planned Quit Date 11/02/19   Tobacco Use Status Recent (Quit < 6 mo ago)   Tobacco Habit Cigar   Tobacco Use per Day (average) 1 cigar   Years of Tobacco Use 60 years   Stages of Change Action   Other Core Components Summary   Interventions Planned Attend education class on Blood Pressure;List benefits of weight management;Educate on importance of maintaining low sodium diet;Attend education class(es) on Nutrition   Interventions In Progress or Completed Instructed on DASH diet;Educated on importance of maintaining low sodium diet;Initiated referral to [place]uit Plan;Attended Nutrition class(es)   Patient Goals Yes   Goal #1 Description 11/5: Maintain smoke free status.   Goal #1 Target Date 01/05/20   Goal #1 Progress Towards Goal 11/5: Pt is a cigar smoker, he quit 3-4 days ago.  He is using Chantix.   Other Core Components Target Outcome Complete Smoking Cessation   Activity/Exercise History   Activity/Exercise Assessment Initial   Activity/Exercise Status prior to event? Sedentary   Number of Days Currently participating in Moderate Physical Activity? 0   Number of Days Currently performing  Aerobic Exercise (including rehab)? 0   Number of Minutes per Session Currently of Aerobic Exercise (average)? 0   Current Stage of Change (Physical Activity) Pre-Contemplation   Current Stage of Change (Aerobic Exercise) Pre-Contemplation   Patient Goals Goal #1;Goal #2   Goal #1 Description 11/5: increase endurance to be  able to go up and down stairs without SOB of fatigue consistently prior to discharge.   Goal #1 Target Date 01/05/20   Goal #1 Progress Towards Goal 11/5: Instructed patient on proper breathing techniques to use with stair climbing.   Goal #2 Description 11/5: Initiate home exercise program of walking or arm movements for 10 continuous minutes 2-3 times per day through discharge.   Goal #2 Target Date 01/05/20   Goal #2 Progress Towards Goal 11/5: Pt is a  and does very little physical activity.   Activity/Exercise Target Outcome An Accumulation of 150  Minutes of Aerobic Activity per Week   Exercise Assessment   6 Minute Walk Predicted - Gender Selection Male   6 Minute Walk Predicted (Male) 1821.93   6 Minute Walk Predicted (Female) 1536.04   Resting HR 63 bpm   Exercise  bpm   Post Exercise HR 63 bpm   Resting /68   Exercise /78   Post Exercise /70   Effort Rating 5   Current MET Level 1.8   MET Level Goal 4-6   ECG Rhythm Atrial fibrillation   Ectopy PVC's   Current Symptoms Weakness   Limitations/Restrictions Orthopedic (see comments)   Exercise Prescription   Mode Treadmill;NuStep;Recumbent bike;Arm Ergometer;Weights   Duration/Time 45-60 min   Frequency 1 day/week   THR (85% of age predicted max HR) 127.5   OMNI Effort Rating (0-10 Scale) 4-6/10   Progression Continuous bouts;Total exercise time of 30-45 minutes;Aerobic exercise to OMNI rating of 5-7, and heart rate at or below target;Progress peak intensity by 1/2 MET per week   Recommended Home Exercise   Type of Exercise Walking   Frequency (days per week) 5   Duration (minutes per session) 15-30 min   Effort Rating Recommended 4-6/10   30 Day Exercise Plan 11/5: Initiate home exercise program   Current Home Exercise   Type of Exercise None   Follow-up/On-going Support   Provider follow-up needed on the following No follow-up needed   Learning Assessment   Learner Patient   Primary Language English   Preferred Learning  Style Listening;Reading;Demonstration;Pictures/Video   Patient Education   Education recommended Anatomy and Physiology of the Heart;Blood Pressure;Exercise Principles;Medication Overview;Muscle Conditioning;Nutrition;Risk Factors;Stress Management   I have established, reviewed and made necessary changes to the individualized treatment plan and exercise prescription for this patient.        Physician Signature: ___________________________________________Date__________

## 2019-11-05 NOTE — TELEPHONE ENCOUNTER
Voicemail left to let lo know that order has been signed by Dr. Diaz. Contact information left for follow up if needed.

## 2019-11-05 NOTE — TELEPHONE ENCOUNTER
[facility] Call Center    Phone Message    May a detailed message be left on voicemail: yes    Reason for Call: Order(s): Other: Christie from cardiac rehab [facility] called in needs Provider to sign orders today by 1:30pm   Reason for requested: Cardiac Rehab  Date needed: 11/05/2019 1:30  Provider name: Joe       Action Taken: Message routed to:  [facility] ([facility]): cardio

## 2019-11-11 DIAGNOSIS — I10 ESSENTIAL HYPERTENSION: ICD-10-CM

## 2019-11-11 DIAGNOSIS — I10 BENIGN ESSENTIAL HYPERTENSION: ICD-10-CM

## 2019-11-11 RX ORDER — AMLODIPINE BESYLATE 5 MG/1
5 TABLET ORAL DAILY
Qty: 30 TABLET | Refills: 0 | Status: SHIPPED | OUTPATIENT
Start: 2019-11-11 | End: 2019-12-12

## 2019-11-11 NOTE — TELEPHONE ENCOUNTER
lisinopril (PRINIVIL/ZESTRIL) 20 MG tablet       Last Written Prescription Date:  7/9/19  Last Fill Quantity: 30,   # refills: 3  Last Office Visit : 7/19/18  Future Office visit:  11/15/19      Routing refill request to provider for review/approval because:  Drug not active on patient's medication list  Medication discontinued per ED visit 9/7/19  Thank-you, Alanis BHATTI RN Medication Refill Team

## 2019-11-14 NOTE — PROGRESS NOTES
"    [facility] HEART [facility]  CARDIOVASCULAR DIVISION    VALVE CLINIC RETURN VISIT    PRIMARY CARDIOLOGIST: Dr. Smyth      PERTINENT CLINICAL HISTORY & REASON FOR CONSULTATION:     Migel Stringer is a very pleasant 69 year old male with severe aortic valvular stenosis that was treated with a transfemoral transcatheter aortic valve replacement (TAVR) with a 34 mm Medtronic Evolut R valve on 10/23/019 by Aletha Diaz, James and Laila. Additional medical hx notable for nonobstructive CAD, HTN, HLD, AAA, hx of gastric bypass, TIA, atrial fibrillation w/intolerance to anticoagulation due to GI bleeding and anemia s/p Watchman 2/2019 and chronic fatigue. The TAVR and post-procedural course were notable for no significant complications. He was noted to have a drop in Hgb from 8.6 to 7.0 which was thought to be dilutional vs procedural. His post TAVR ECG showed AF with HR 60s. Post-TAVR echo showed mean gradient across the aortic valve 7mmHg. The patient was discharge home on ASA and Plavix. He presents to clinic for 1 month TAVR follow-up.     The patient was hospitalized on 10/29/19 for upper GI bleed at [facility]. He underwent EGD and was found to have several non-bleeding ulcers in the gastric pouch. During this hospitalization, Plavix was stopped for 1 month per consultation with cardiology, the patient was instructed to start Pepcid, Carafate, and Prilosec. He was readmitted to [facility] on 11/13 after another episode of hematemesis and underwent repeat EGD. His hemoglobin remained stable. He was advised to discontinue ASA, as well as Carafate. The patient states that he has not been taking the recommended Pepcid. Unclear whether the treatment at [facility] knew about his recent TAVR.    He presents today with no specific cardiovascular concerns however, he is concerned about \"leg fatigue\" which he states that is a chronic problem for him but he feels \"its worse since my valve surgery.\" The patient states " "that he struggles to bear weight on his left leg as the predominant leg when getting up but feels he is able to get in and out of his truck without incident. He has not noticed a significant difference in his general overall health since the procedure. He denies any chest pain, shortness of breath, orthopnea or PND.  He has not had any pre or jordan syncope. He has not had any palpitations. He does have +2 BLE edema (L>R) at baseline and the patient feels that his BLE edema is \"better today than most days.\" He has no other specific cardiopulmonary concerns today. He states that he has not had any further episodes of hematemesis. He is not currently on antiplatelet therapy.      PAST MEDICAL HISTORY:     Past Medical History:   Diagnosis Date     AAA (abdominal aortic aneurysm) (H)      Aortic stenosis      Asthma      Atrial fibrillation (H)      Chronic low back pain      Chronic nausea      GERD (gastroesophageal reflux disease)      Heart murmur      Heart rate problem      History of heparin-induced thrombocytopenia     probably history based on symptoms, repeat titers negative     History of sleep apnea      Hyperlipidemia      Hypertension      Insomnia      Left renal mass      Migraines      Mitral regurgitation      Peripheral neuropathy      Renal mass      Status post gastric bypass for obesity      Ulcer, gastric, acute      Vomiting in adult         PAST SURGICAL HISTORY:     Past Surgical History:   Procedure Laterality Date     CHOLECYSTECTOMY       COLONOSCOPY       CV LEFT ATRIAL APPENDAGE CLOSURE N/A 2/5/2019    Procedure: Left Atrial Appendage Closure;  Surgeon: Michael Diaz MD;  Location:  HEART CARDIAC CATH LAB     CV TRANSCATHETER AORTIC VALVE REPLACEMENT N/A 10/23/2019    Procedure: Femoral Transcatheter  Aortic Valve Replacement with Medtronic Evolut R 34mm Valve, on Cardiopulmonary Bypass Pump Standby, Transthoracic Echocardiogram;  Surgeon: Michael Diaz MD;  Location:  OR "     ESOPHAGOGASTRODUODENOSCOPY       ESOPHAGOSCOPY, GASTROSCOPY, DUODENOSCOPY (EGD), COMBINED N/A 10/30/2019    Procedure: ESOPHAGOGASTRODUODENOSCOPY, WITH BIOPSY;  Surgeon: Migel Baldwin MD;  Location: [facility]     GASTRIC BYPASS  2011     HEART CATH FEMORAL CANNULIZATION WITH OPEN STANDBY REPAIR AORTIC VALVE N/A 10/23/2019    Procedure: t;  Surgeon: Theo Ulloa MD;  Location: [facility] OR     HERNIA REPAIR       SPINE SURGERY      hx of 6 lumbar surgeries and 1 thoracic surgery        CURRENT MEDICATIONS:     Current Outpatient Medications   Medication Sig Dispense Refill     albuterol (PROAIR HFA/PROVENTIL HFA/VENTOLIN HFA) 108 (90 Base) MCG/ACT Inhaler Inhale 1-2 puffs into the lungs every 6 hours as needed        amLODIPine (NORVASC) 5 MG tablet Take 1 tablet (5 mg) by mouth daily 30 tablet 0     aspirin (ASA) 81 MG EC tablet Take 1 tablet (81 mg) by mouth daily 30 tablet 3     atorvastatin (LIPITOR) 20 MG tablet Take 20 mg by mouth At Bedtime        diphenhydrAMINE-acetaminophen (TYLENOL PM)  MG tablet Take 2 tablets by mouth At Bedtime        famotidine (PEPCID) 20 MG tablet Take 1 tablet (20 mg) by mouth 2 times daily 60 tablet 1     fentaNYL (DURAGESIC) 50 mcg/hr 72 hr patch Place 1 patch onto the skin every 72 hours        ferrous sulfate (FE TABS) 325 (65 Fe) MG EC tablet Take 325 mg by mouth daily       omeprazole (PRILOSEC) 40 MG capsule Take 1 capsule (40 mg) by mouth 2 times daily Take 30-60 minutes before a meal. 60 capsule 11     oxyCODONE-acetaminophen (PERCOCET)  MG per tablet Take 1 tablet by mouth every 4 hours Six times daily       pregabalin (LYRICA) 100 MG capsule Take 100 mg by mouth 4 times daily 0400, 1100, 1600, 1800       prochlorperazine (COMPAZINE) 10 MG tablet Take 10 mg by mouth every 8 hours as needed for nausea or vomiting       senna-docusate (SENOKOT-S/PERICOLACE) 8.6-50 MG tablet Take 1 tablet by mouth daily as needed for constipation       sucralfate (CARAFATE) 1  GM tablet Take 1 tablet (1 g) by mouth 4 times daily (before meals and nightly) 120 tablet 0     SUMAtriptan (IMITREX) 100 MG tablet Take 100 mg by mouth 2 times daily as needed        traZODone (DESYREL) 100 MG tablet Take 100 mg by mouth At Bedtime       varenicline (CHANTIX) 1 MG tablet Take 1 mg by mouth 2 times daily          ALLERGIES:     Allergies   Allergen Reactions     Codeine Hives and Itching     Methadone Nausea        FAMILY HISTORY:     Family History   Problem Relation Age of Onset     Skin Cancer Mother      Chronic Obstructive Pulmonary Disease Mother      Diabetes Mother      Liver Cancer Father      Breast Cancer Father      Breast Cancer Sister      No Known Problems Brother      Diabetes Maternal Grandmother      Cancer Paternal Grandmother         unspecified cancer     No Known Problems Brother      Cancer Paternal Grandfather         unspecified cancer        SOCIAL HISTORY:     Social History     Socioeconomic History     Marital status:      Spouse name: Not on file     Number of children: 4     Years of education: Not on file     Highest education level: Not on file   Occupational History     Occupation:      Employer: [employer]   Social Needs     Financial resource strain: Not on file     Food insecurity:     Worry: Not on file     Inability: Not on file     Transportation needs:     Medical: Not on file     Non-medical: Not on file   Tobacco Use     Smoking status: Current Every Day Smoker     Packs/day: 1.00     Years: 50.00     Pack years: 50.00     Types: Cigarettes, Cigars     Smokeless tobacco: Never Used     Tobacco comment: quit cigarettes in 2016.  now smokes a small cigar daily   Substance and Sexual Activity     Alcohol use: No     Drug use: No     Sexual activity: Not Currently     Partners: Female     Birth control/protection: None   Lifestyle     Physical activity:     Days per week: Not on file     Minutes per session: Not on file     Stress:  "Not on file   Relationships     Social connections:     Talks on phone: Not on file     Gets together: Not on file     Attends Jehovah's witness service: Not on file     Active member of club or organization: Not on file     Attends meetings of clubs or organizations: Not on file     Relationship status: Not on file     Intimate partner violence:     Fear of current or ex partner: Not on file     Emotionally abused: Not on file     Physically abused: Not on file     Forced sexual activity: Not on file   Other Topics Concern     Not on file   Social History Narrative     Not on file        REVIEW OF SYSTEMS:     Constitutional: No fevers or chills  Skin: No new rash or itching  Eyes: No acute change in vision  Ears/Nose/Throat: No purulent rhinorrhea, new hearing loss, or new vertigo  Respiratory: No cough or hemoptysis  Cardiovascular: See HPI, denies palpitation, shortness of breath, dyspnea, has chronic atrial fibrillation  Gastrointestinal: No change in appetite, vomiting, diarrhea, last hematemesis 11/13/19   Genitourinary: No dysuria or hematuria  Musculoskeletal: No new back pain, neck pain or muscle pain, complains of BLE fatigue (L>R)  Neurologic: No new headaches, focal weakness or behavior changes  Psychiatric: No hallucinations, excessive alcohol consumption or illegal drug usage  Hematologic/Lymphatic/Immunologic: No bleeding, chills, fever, night sweats or weight loss  Endocrine: No new cold intolerance, heat intolerance, polyphagia, polydipsia or polyuria      PHYSICAL EXAMINATION:     /84 (BP Location: Left arm, Patient Position: Chair, Cuff Size: Adult Regular)   Pulse 76   Ht 1.778 m (5' 10\")   Wt 73.5 kg (162 lb)   SpO2 99%   BMI 23.24 kg/m      GENERAL: No acute distress, flat affect  HEENT: EOMI. Sclerae white, not injected. Nares clear. Pharynx without erythema or exudate.   Neck: No adenopathy. No thyromegaly. No jugular venous distension.   Heart: Irregular rate and rhythm. 2/6 systolic " murmur RUSB  Lungs: Clear to auscultation. No ronchi, wheezes, rales.   Abdomen: Soft, nontender, nondistended. Bowel sounds present.  Extremities: No clubbing, cyanosis, or edema.   Neurologic: Alert and oriented to person/place/time, normal speech and affect. No focal deficits.  Skin: No petechiae, purpura or rash, bilateral TAVR surgical groin sites clean/dry/intact, soft, no hematoma, no bruit, no thrill     LABORATORY DATA:     LIPID RESULTS:  Lab Results   Component Value Date    CHOL 108 08/02/2018    HDL 42 08/02/2018    LDL 47 08/02/2018    TRIG 91 08/02/2018       LIVER ENZYME RESULTS:  Lab Results   Component Value Date    AST 13 10/23/2019    ALT 12 10/23/2019       CBC RESULTS:  Lab Results   Component Value Date    WBC 6.3 11/15/2019    RBC 3.76 (L) 11/15/2019    HGB 9.6 (L) 11/15/2019    HCT 33.1 (L) 11/15/2019    MCV 88 11/15/2019    MCH 25.5 (L) 11/15/2019    MCHC 29.0 (L) 11/15/2019    RDW 19.3 (H) 11/15/2019     11/15/2019       BMP RESULTS:  Lab Results   Component Value Date     11/15/2019    POTASSIUM 4.2 11/15/2019    CHLORIDE 109 11/15/2019    CO2 30 11/15/2019    ANIONGAP 2 (L) 11/15/2019    GLC 94 11/15/2019    BUN 8 11/15/2019    CR 0.71 11/15/2019    GFRESTIMATED >90 11/15/2019    GFRESTBLACK >90 11/15/2019    SUZY 7.6 (L) 11/15/2019         PROCEDURES & FURTHER ASSESSMENTS:   ECG dated 11/15/19:  Atrial fibrillation HR 67, normal QRS 82 ms    Echocardiogram 11/15/19:  Interpretation Summary  Global and regional left ventricular function is normal with an EF of 60-65%.  Right ventricular function, chamber size, wall motion, and thickness are  normal.  S/P TAVR with 34 mm Medtronic Evolut Rvalve. Mean aortic gradient 3 mmHg.  Trivial paravalvular regurgitation.  No pericardial effusion is present.    Echocardiogram 10/24/2019:  Interpretation Summary  TAVR with 34 mm Medtronic Evolut R. The mean gradient across the aortic valve  is7 mmHg. Trace para-valvular aortic  "insufficiency is present.  Global and regional left ventricular function is hyperkinetic with an EF >70%.  There is minimal intra-cavitary gradient.  No pericardial effusion is present./  Right ventricular function, chamber size, wall motion, and thickness are  normal.  IVC diameter and respiratory changes fall into an intermediate range  suggesting an RA pressure of 8 mmHg.  The aorta root is normal.  Compared to yesterday, AI is slightly improved, no other change.    NYHA Class: III     CLINICAL IMPRESSION:     Migel Stringer is a 69 year old male with severe aortic stenosis treated with transfemoral TAVR with a 34 mm Medtronic Evolut Rvalve on 10/23/2019 by Aletha Diaz, James and Laila course complicated recurrent GI bleeds and discontinuation of antiplatelet therapy.     Today patient has no specific cardiovascular complaints. ECG shows permanent rate controlled a-fib. Echo shows mean PG of 4 mmHg with trace paravalvular AI. Will resume ASA monotherapy today to prevent thrombosis of his TAVR valve. He will need to follow with GI closely for monitoring of his gastric ulcers. Plans to follow-up with valve clinic in 2 months w/repeat echo given interruption in antiplatelet therapy due to GI bleed.     Plan today:  1. Resume ASA 81mg PO daily  2. Resume Pepcid 20mg PO BID, Carafate 1gm 4x/daily before meals (pt. Instructed to crush tab and mix with water to create \"slurry\" to coat stomach), in addition to Prilosec 40mg PO BID  3. Follow up with GI in 1-2 weeks (Dr. Benitez, [place])  4. Resume Plavix in 2 weeks pending GI's evaluation of current gastric pouch ulcers  5. Discussed importance of antibiotic prophylaxis  6. Follow up in TAVR clinic in 2 months (Echo, EKG, labs pending prior to appt.)  7. Consider exercise ABIs at next visit if continued complaints of BLE fatigue present    JUAN CARLOS Fox, CNP  [facility] Cardiology Team    PROVIDER ATTESTATION:  This patient has been seen and examined by me on " November 17, 2019 , with Ros Helton, NIKO student. I have reviewed the vitals, laboratory and imaging data relevant to this patient's care. I have edited this note to reflect our joint assessment and plan, and discussed the plan with the patient.    JUAN CARLOS Fox, CNP  Cardiology Nurse Practitioner  [facility]  Clinic: [phone]  Hospital: [phone]          Patient Care Team:  Joel Dorado as PCP - General (Family Practice)  Nelli Mccallum as Nurse Coordinator (Cardiology)  Diogo Myrick MD as MD (Neurology)  Leslye Calix, RN as Nurse Coordinator (Cardiology)  SELF, REFERRED

## 2019-11-15 ENCOUNTER — OFFICE VISIT (OUTPATIENT)
Dept: CARDIOLOGY | Facility: CLINIC | Age: 69
End: 2019-11-15
Attending: NURSE PRACTITIONER
Payer: MEDICARE

## 2019-11-15 ENCOUNTER — ANCILLARY PROCEDURE (OUTPATIENT)
Dept: CARDIOLOGY | Facility: CLINIC | Age: 69
End: 2019-11-15
Attending: INTERNAL MEDICINE
Payer: COMMERCIAL

## 2019-11-15 VITALS
DIASTOLIC BLOOD PRESSURE: 84 MMHG | BODY MASS INDEX: 23.19 KG/M2 | HEIGHT: 70 IN | SYSTOLIC BLOOD PRESSURE: 138 MMHG | HEART RATE: 76 BPM | OXYGEN SATURATION: 99 % | WEIGHT: 162 LBS

## 2019-11-15 DIAGNOSIS — Z95.2 S/P TAVR (TRANSCATHETER AORTIC VALVE REPLACEMENT): ICD-10-CM

## 2019-11-15 DIAGNOSIS — Z95.2 S/P TAVR (TRANSCATHETER AORTIC VALVE REPLACEMENT): Primary | ICD-10-CM

## 2019-11-15 LAB
ANION GAP SERPL CALCULATED.3IONS-SCNC: 2 MMOL/L (ref 3–14)
BUN SERPL-MCNC: 8 MG/DL (ref 7–30)
CALCIUM SERPL-MCNC: 7.6 MG/DL (ref 8.5–10.1)
CHLORIDE SERPL-SCNC: 109 MMOL/L (ref 94–109)
CO2 SERPL-SCNC: 30 MMOL/L (ref 20–32)
CREAT SERPL-MCNC: 0.71 MG/DL (ref 0.66–1.25)
ERYTHROCYTE [DISTWIDTH] IN BLOOD BY AUTOMATED COUNT: 19.3 % (ref 10–15)
GFR SERPL CREATININE-BSD FRML MDRD: >90 ML/MIN/{1.73_M2}
GLUCOSE SERPL-MCNC: 94 MG/DL (ref 70–99)
HCT VFR BLD AUTO: 33.1 % (ref 40–53)
HGB BLD-MCNC: 9.6 G/DL (ref 13.3–17.7)
MCH RBC QN AUTO: 25.5 PG (ref 26.5–33)
MCHC RBC AUTO-ENTMCNC: 29 G/DL (ref 31.5–36.5)
MCV RBC AUTO: 88 FL (ref 78–100)
PLATELET # BLD AUTO: 190 10E9/L (ref 150–450)
POTASSIUM SERPL-SCNC: 4.2 MMOL/L (ref 3.4–5.3)
RBC # BLD AUTO: 3.76 10E12/L (ref 4.4–5.9)
SODIUM SERPL-SCNC: 141 MMOL/L (ref 133–144)
WBC # BLD AUTO: 6.3 10E9/L (ref 4–11)

## 2019-11-15 PROCEDURE — G0463 HOSPITAL OUTPT CLINIC VISIT: HCPCS | Mod: 25,ZF

## 2019-11-15 PROCEDURE — 99213 OFFICE O/P EST LOW 20 MIN: CPT | Mod: ZP | Performed by: NURSE PRACTITIONER

## 2019-11-15 PROCEDURE — 85027 COMPLETE CBC AUTOMATED: CPT | Performed by: INTERNAL MEDICINE

## 2019-11-15 PROCEDURE — 93005 ELECTROCARDIOGRAM TRACING: CPT | Mod: ZF

## 2019-11-15 PROCEDURE — 36415 COLL VENOUS BLD VENIPUNCTURE: CPT | Performed by: INTERNAL MEDICINE

## 2019-11-15 PROCEDURE — 93010 ELECTROCARDIOGRAM REPORT: CPT | Mod: ZP | Performed by: INTERNAL MEDICINE

## 2019-11-15 PROCEDURE — 80048 BASIC METABOLIC PNL TOTAL CA: CPT | Performed by: INTERNAL MEDICINE

## 2019-11-15 RX ORDER — VARENICLINE TARTRATE 1 MG/1
1 TABLET, FILM COATED ORAL 2 TIMES DAILY
COMMUNITY

## 2019-11-15 ASSESSMENT — MIFFLIN-ST. JEOR: SCORE: 1501.08

## 2019-11-15 ASSESSMENT — PATIENT HEALTH QUESTIONNAIRE - PHQ9: SUM OF ALL RESPONSES TO PHQ QUESTIONS 1-9: 12

## 2019-11-15 ASSESSMENT — PAIN SCALES - GENERAL: PAINLEVEL: NO PAIN (0)

## 2019-11-15 NOTE — PATIENT INSTRUCTIONS
TAVR post-procedure:   1. Resume aspirin 81 mg daily lifelong.  2. Continue to hold Plavix, will reassess in 2 weeks after you see GI  3. START PEPCID AND CARAFATE FOR YOUR ULCER  4. See GI within the next 1-2 weeks for evaluation of uclers  5. Delay any nonurgent dental procedures for the first 3 month post TAVR.  6. For all future dental cleanings and procedures you will need to take antibiotics prior - see instructions below.   7. Return to valve clinic in 3 months with echo, ecg and labs prior (we will call you to schedule)    Prevention of Infective (Bacterial) Endocarditis:  You are at increased risk for developing adverse outcomes from infective endocarditis (IE), also known as bacterial endocarditis (BE) because of the new device in your heart. The guidelines for prevention of IE are to give patients antibiotics prior to any dental procedures that involve manipulation of gingival tissue or the periapical region of teeth, or perforation of the oral mucosa:       It is recommended to take Amoxicillin 2 gm by mouth as a single dose 30 to 60 minutes before procedure.     OR if allergic to Penicillin or Ampicillin:     Cephalexin 2 gm by mouth, or  Clindamycin 600 mg by mouth, or  Azithromycin or Clarithromycin 500 mg PO     Questions and [phone].   First press #1 for the [place] and then press #3 for Medical Questions to reach the Cardiology triage nurse.     On Call Cardiologist for after hours or on weekends: [phone], press option #4 and ask to speak to the on-call Cardiologist.

## 2019-11-15 NOTE — LETTER
11/15/2019      RE: Migel Stringer  [address]  [place] [address]       Dear Colleague,    Thank you for the opportunity to participate in the care of your patient, Migel Stringer, at the [facility] at [facility]. Please see a copy of my visit note below.    [facility] HEART [facility]  CARDIOVASCULAR DIVISION    VALVE CLINIC RETURN VISIT    PRIMARY CARDIOLOGIST: Dr. Smyth      PERTINENT CLINICAL HISTORY & REASON FOR CONSULTATION:     Migel Stringer is a very pleasant 69 year old male with severe aortic valvular stenosis that was treated with a transfemoral transcatheter aortic valve replacement (TAVR) with a 34 mm Medtronic Evolut R valve on 10/23/019 by Aletha Diaz, James and Laila. Additional medical hx notable for nonobstructive CAD, HTN, HLD, AAA, hx of gastric bypass, TIA, atrial fibrillation w/intolerance to anticoagulation due to GI bleeding and anemia s/p Watchman 2/2019 and chronic fatigue. The TAVR and post-procedural course were notable for no significant complications. He was noted to have a drop in Hgb from 8.6 to 7.0 which was thought to be dilutional vs procedural. His post TAVR ECG showed AF with HR 60s. Post-TAVR echo showed mean gradient across the aortic valve 7mmHg. The patient was discharge home on ASA and Plavix. He presents to clinic for 1 month TAVR follow-up.     The patient was hospitalized on 10/29/19 for upper GI bleed at [facility]. He underwent EGD and was found to have several non-bleeding ulcers in the gastric pouch. During this hospitalization, Plavix was stopped for 1 month per consultation with cardiology, the patient was instructed to start Pepcid, Carafate, and Prilosec. He was readmitted to [facility] on 11/13 after another episode of hematemesis and underwent repeat EGD. His hemoglobin remained stable. He was advised to discontinue ASA, as well as Carafate. The patient states that he has not  "been taking the recommended Pepcid. Unclear whether the treatment at [facility] knew about his recent TAVR.    He presents today with no specific cardiovascular concerns however, he is concerned about \"leg fatigue\" which he states that is a chronic problem for him but he feels \"its worse since my valve surgery.\" The patient states that he struggles to bear weight on his left leg as the predominant leg when getting up but feels he is able to get in and out of his truck without incident. He has not noticed a significant difference in his general overall health since the procedure. He denies any chest pain, shortness of breath, orthopnea or PND.  He has not had any pre or jordan syncope. He has not had any palpitations. He does have +2 BLE edema (L>R) at baseline and the patient feels that his BLE edema is \"better today than most days.\" He has no other specific cardiopulmonary concerns today. He states that he has not had any further episodes of hematemesis. He is not currently on antiplatelet therapy.      PAST MEDICAL HISTORY:     Past Medical History:   Diagnosis Date     AAA (abdominal aortic aneurysm) (H)      Aortic stenosis      Asthma      Atrial fibrillation (H)      Chronic low back pain      Chronic nausea      GERD (gastroesophageal reflux disease)      Heart murmur      Heart rate problem      History of heparin-induced thrombocytopenia     probably history based on symptoms, repeat titers negative     History of sleep apnea      Hyperlipidemia      Hypertension      Insomnia      Left renal mass      Migraines      Mitral regurgitation      Peripheral neuropathy      Renal mass      Status post gastric bypass for obesity      Ulcer, gastric, acute      Vomiting in adult         PAST SURGICAL HISTORY:     Past Surgical History:   Procedure Laterality Date     CHOLECYSTECTOMY       COLONOSCOPY       CV LEFT ATRIAL APPENDAGE CLOSURE N/A 2/5/2019    Procedure: Left Atrial Appendage Closure;  Surgeon: Joe" MD Michael;  Location:  HEART CARDIAC CATH LAB     CV TRANSCATHETER AORTIC VALVE REPLACEMENT N/A 10/23/2019    Procedure: Femoral Transcatheter  Aortic Valve Replacement with Medtronic Evolut R 34mm Valve, on Cardiopulmonary Bypass Pump Standby, Transthoracic Echocardiogram;  Surgeon: Michael Diaz MD;  Location: [facility] OR     ESOPHAGOGASTRODUODENOSCOPY       ESOPHAGOSCOPY, GASTROSCOPY, DUODENOSCOPY (EGD), COMBINED N/A 10/30/2019    Procedure: ESOPHAGOGASTRODUODENOSCOPY, WITH BIOPSY;  Surgeon: Migel Baldwin MD;  Location: [facility]     GASTRIC BYPASS  2011     HEART CATH FEMORAL CANNULIZATION WITH OPEN STANDBY REPAIR AORTIC VALVE N/A 10/23/2019    Procedure: t;  Surgeon: Theo Ulloa MD;  Location: [facility] OR     HERNIA REPAIR       SPINE SURGERY      hx of 6 lumbar surgeries and 1 thoracic surgery        CURRENT MEDICATIONS:     Current Outpatient Medications   Medication Sig Dispense Refill     albuterol (PROAIR HFA/PROVENTIL HFA/VENTOLIN HFA) 108 (90 Base) MCG/ACT Inhaler Inhale 1-2 puffs into the lungs every 6 hours as needed        amLODIPine (NORVASC) 5 MG tablet Take 1 tablet (5 mg) by mouth daily 30 tablet 0     aspirin (ASA) 81 MG EC tablet Take 1 tablet (81 mg) by mouth daily 30 tablet 3     atorvastatin (LIPITOR) 20 MG tablet Take 20 mg by mouth At Bedtime        diphenhydrAMINE-acetaminophen (TYLENOL PM)  MG tablet Take 2 tablets by mouth At Bedtime        famotidine (PEPCID) 20 MG tablet Take 1 tablet (20 mg) by mouth 2 times daily 60 tablet 1     fentaNYL (DURAGESIC) 50 mcg/hr 72 hr patch Place 1 patch onto the skin every 72 hours        ferrous sulfate (FE TABS) 325 (65 Fe) MG EC tablet Take 325 mg by mouth daily       omeprazole (PRILOSEC) 40 MG capsule Take 1 capsule (40 mg) by mouth 2 times daily Take 30-60 minutes before a meal. 60 capsule 11     oxyCODONE-acetaminophen (PERCOCET)  MG per tablet Take 1 tablet by mouth every 4 hours Six times daily       pregabalin (LYRICA) 100  MG capsule Take 100 mg by mouth 4 times daily 0400, 1100, 1600, 1800       prochlorperazine (COMPAZINE) 10 MG tablet Take 10 mg by mouth every 8 hours as needed for nausea or vomiting       senna-docusate (SENOKOT-S/PERICOLACE) 8.6-50 MG tablet Take 1 tablet by mouth daily as needed for constipation       sucralfate (CARAFATE) 1 GM tablet Take 1 tablet (1 g) by mouth 4 times daily (before meals and nightly) 120 tablet 0     SUMAtriptan (IMITREX) 100 MG tablet Take 100 mg by mouth 2 times daily as needed        traZODone (DESYREL) 100 MG tablet Take 100 mg by mouth At Bedtime       varenicline (CHANTIX) 1 MG tablet Take 1 mg by mouth 2 times daily          ALLERGIES:     Allergies   Allergen Reactions     Codeine Hives and Itching     Methadone Nausea        FAMILY HISTORY:     Family History   Problem Relation Age of Onset     Skin Cancer Mother      Chronic Obstructive Pulmonary Disease Mother      Diabetes Mother      Liver Cancer Father      Breast Cancer Father      Breast Cancer Sister      No Known Problems Brother      Diabetes Maternal Grandmother      Cancer Paternal Grandmother         unspecified cancer     No Known Problems Brother      Cancer Paternal Grandfather         unspecified cancer        SOCIAL HISTORY:     Social History     Socioeconomic History     Marital status:      Spouse name: Not on file     Number of children: 4     Years of education: Not on file     Highest education level: Not on file   Occupational History     Occupation:      Employer: [employer]   Social Needs     Financial resource strain: Not on file     Food insecurity:     Worry: Not on file     Inability: Not on file     Transportation needs:     Medical: Not on file     Non-medical: Not on file   Tobacco Use     Smoking status: Current Every Day Smoker     Packs/day: 1.00     Years: 50.00     Pack years: 50.00     Types: Cigarettes, Cigars     Smokeless tobacco: Never Used     Tobacco comment:  quit cigarettes in 2016.  now smokes a small cigar daily   Substance and Sexual Activity     Alcohol use: No     Drug use: No     Sexual activity: Not Currently     Partners: Female     Birth control/protection: None   Lifestyle     Physical activity:     Days per week: Not on file     Minutes per session: Not on file     Stress: Not on file   Relationships     Social connections:     Talks on phone: Not on file     Gets together: Not on file     Attends Roman [employer] service: Not on file     Active member of club or organization: Not on file     Attends meetings of clubs or organizations: Not on file     Relationship status: Not on file     Intimate partner violence:     Fear of current or ex partner: Not on file     Emotionally abused: Not on file     Physically abused: Not on file     Forced sexual activity: Not on file   Other Topics Concern     Not on file   Social History Narrative     Not on file        REVIEW OF SYSTEMS:     Constitutional: No fevers or chills  Skin: No new rash or itching  Eyes: No acute change in vision  Ears/Nose/Throat: No purulent rhinorrhea, new hearing loss, or new vertigo  Respiratory: No cough or hemoptysis  Cardiovascular: See HPI, denies palpitation, shortness of breath, dyspnea, has chronic atrial fibrillation  Gastrointestinal: No change in appetite, vomiting, diarrhea, last hematemesis 11/13/19   Genitourinary: No dysuria or hematuria  Musculoskeletal: No new back pain, neck pain or muscle pain, complains of BLE fatigue (L>R)  Neurologic: No new headaches, focal weakness or behavior changes  Psychiatric: No hallucinations, excessive alcohol consumption or illegal drug usage  Hematologic/Lymphatic/Immunologic: No bleeding, chills, fever, night sweats or weight loss  Endocrine: No new cold intolerance, heat intolerance, polyphagia, polydipsia or polyuria      PHYSICAL EXAMINATION:     /84 (BP Location: Left arm, Patient Position: Chair, Cuff Size: Adult Regular)   Pulse 76    "Ht 1.778 m (5' 10\")   Wt 73.5 kg (162 lb)   SpO2 99%   BMI 23.24 kg/m       GENERAL: No acute distress, flat affect  HEENT: EOMI. Sclerae white, not injected. Nares clear. Pharynx without erythema or exudate.   Neck: No adenopathy. No thyromegaly. No jugular venous distension.   Heart: Irregular rate and rhythm. 2/6 systolic murmur RUSB  Lungs: Clear to auscultation. No ronchi, wheezes, rales.   Abdomen: Soft, nontender, nondistended. Bowel sounds present.  Extremities: No clubbing, cyanosis, or edema.   Neurologic: Alert and oriented to person/place/time, normal speech and affect. No focal deficits.  Skin: No petechiae, purpura or rash, bilateral TAVR surgical groin sites clean/dry/intact, soft, no hematoma, no bruit, no thrill     LABORATORY DATA:     LIPID RESULTS:  Lab Results   Component Value Date    CHOL 108 08/02/2018    HDL 42 08/02/2018    LDL 47 08/02/2018    TRIG 91 08/02/2018       LIVER ENZYME RESULTS:  Lab Results   Component Value Date    AST 13 10/23/2019    ALT 12 10/23/2019       CBC RESULTS:  Lab Results   Component Value Date    WBC 6.3 11/15/2019    RBC 3.76 (L) 11/15/2019    HGB 9.6 (L) 11/15/2019    HCT 33.1 (L) 11/15/2019    MCV 88 11/15/2019    MCH 25.5 (L) 11/15/2019    MCHC 29.0 (L) 11/15/2019    RDW 19.3 (H) 11/15/2019     11/15/2019       BMP RESULTS:  Lab Results   Component Value Date     11/15/2019    POTASSIUM 4.2 11/15/2019    CHLORIDE 109 11/15/2019    CO2 30 11/15/2019    ANIONGAP 2 (L) 11/15/2019    GLC 94 11/15/2019    BUN 8 11/15/2019    CR 0.71 11/15/2019    GFRESTIMATED >90 11/15/2019    GFRESTBLACK >90 11/15/2019    SUZY 7.6 (L) 11/15/2019         PROCEDURES & FURTHER ASSESSMENTS:   ECG dated 11/15/19:  Atrial fibrillation HR 67, normal QRS 82 ms    Echocardiogram 11/15/19:  Interpretation Summary  Global and regional left ventricular function is normal with an EF of 60-65%.  Right ventricular function, chamber size, wall motion, and thickness " "are  normal.  S/P TAVR with 34 mm Medtronic Evolut Rvalve. Mean aortic gradient 3 mmHg.  Trivial paravalvular regurgitation.  No pericardial effusion is present.    Echocardiogram 10/24/2019:  Interpretation Summary  TAVR with 34 mm Medtronic Evolut R. The mean gradient across the aortic valve  is7 mmHg. Trace para-valvular aortic insufficiency is present.  Global and regional left ventricular function is hyperkinetic with an EF >70%.  There is minimal intra-cavitary gradient.  No pericardial effusion is present./  Right ventricular function, chamber size, wall motion, and thickness are  normal.  IVC diameter and respiratory changes fall into an intermediate range  suggesting an RA pressure of 8 mmHg.  The aorta root is normal.  Compared to yesterday, AI is slightly improved, no other change.    NYHA Class: III     CLINICAL IMPRESSION:     Migel Stringer is a 69 year old male with severe aortic stenosis treated with transfemoral TAVR with a 34 mm Medtronic Evolut Rvalve on 10/23/2019 by James Mayorga and Laila course complicated recurrent GI bleeds and discontinuation of antiplatelet therapy.     Today patient has no specific cardiovascular complaints. ECG shows permanent rate controlled a-fib. Echo shows mean PG of 4 mmHg with trace paravalvular AI. Will resume ASA monotherapy today to prevent thrombosis of his TAVR valve. He will need to follow with GI closely for monitoring of his gastric ulcers. Plans to follow-up with valve clinic in 2 months w/repeat echo given interruption in antiplatelet therapy due to GI bleed.     Plan today:  1. Resume ASA 81mg PO daily  2. Resume Pepcid 20mg PO BID, Carafate 1gm 4x/daily before meals (pt. Instructed to crush tab and mix with water to create \"slurry\" to coat stomach), in addition to Prilosec 40mg PO BID  3. Follow up with GI in 1-2 weeks (Dr. Benitez, [place])  4. Resume Plavix in 2 weeks pending GI's evaluation of current gastric pouch ulcers  5. Discussed " importance of antibiotic prophylaxis  6. Follow up in TAVR clinic in 2 months (Echo, EKG, labs pending prior to appt.)  7. Consider exercise ABIs at next visit if continued complaints of BLE fatigue present    JUAN CARLOS Fox, CNP  [facility] Cardiology Team    PROVIDER ATTESTATION:  This patient has been seen and examined by me on November 17, 2019 , with Ros Helton, DNP student. I have reviewed the vitals, laboratory and imaging data relevant to this patient's care. I have edited this note to reflect our joint assessment and plan, and discussed the plan with the patient.    JUAN CARLOS Fox, CNP  Cardiology Nurse Practitioner  [facility]  Clinic: [phone]  Hospital: [phone]    CC  Patient Care Team:  Joel Dorado as PCP - General (Family Practice)  Nelli Mccallum as Nurse Coordinator (Cardiology)  Diogo Myrick MD as MD (Neurology)  Leslye Calix, RN as Nurse Coordinator (Cardiology)  SELF, REFERRED

## 2019-11-15 NOTE — NURSING NOTE
Chief Complaint   Patient presents with     New Patient     2 week S/P TAVR follow up with Echo and labs prior     Vitals were taken and medications were reconciled.     Rola Diallo CMA    2:11 PM

## 2019-11-18 LAB — INTERPRETATION ECG - MUSE: NORMAL

## 2019-11-29 RX ORDER — LISINOPRIL 20 MG/1
20 TABLET ORAL DAILY
Qty: 30 TABLET | Refills: 3 | OUTPATIENT
Start: 2019-11-29

## 2019-12-03 ASSESSMENT — 6 MINUTE WALK TEST (6MWT): GENDER SELECTION: MALE

## 2019-12-03 NOTE — PROGRESS NOTES
12/03/19 1600   Session   Session 30 Day Individualized Treatment Plan   Certified through this date 01/01/20   Cardiac Rehab Assessment   Cardiac Rehab Assessment Pt has not attended CR since his inital evaluation on 11/5.  Left message for patient to call for status update.  Pt had not returned call at time of update.  Unable to reassess goals due to patient's absence at time of update.  Skilled therapy is necessary in order to monitor CV response to workloads, to support and encourage patient with initiation and safe progression of home exercise program, to educate patient on nutrition and diet and behavior change counseling needed to help patient attain his goals.   General Information   Treatment Diagnosis Valve Replacement   Date of Treatment Diagnosis 10/23/19   Significant Past CV History Chronic AF   Comorbidities Cerebrovascular Disease   Other Medical History Non obstructive CAD, Afib s/p watchman 2/2019, Chronic low back pain, RLE neuropathy, Hx of AAA, chronic iron deficiency anemia, HTN, HLD, Hx of TIA, migraines, GERD and insomnia   Lead up symptoms Progressive LUCIANO lasting for several months..   Hospital Location [facility]   Hospital Discharge Date 10/24/19   Signs and Symptoms Post Hospital Discharge None   Outpatient Cardiac Rehab Start Date 11/05/19   Primary Physician Joel Dorado   Primary Physician Follow Up Advised to schedule appointment   Surgeon Juan Ramirez   Surgeon Follow Up Advised to schedule appointment   Cardiologist TBA   Ejection Fraction 70%   Risk Stratification Low   Summary of Cath Report   Summary of Cath Report No information available   Living and Work Status    Living Arrangements and Social Status house;significant other   Support System Live with an adult;Check-in help as needed   Return to Employment Yes   Occupation    Preventative Medications   CMS recommended medications Ace inhibitors   Fall Risk Screen   Fall screen completed by Cardiac Rehab   Have you  fallen 2 or more times in the past year? No   Have you fallen and had an injury in the past year? No   Is patient a fall risk? No   Abuse Screen (yes response referral indicated)   Feels Threatened by Someone no   Does Anyone Try to Keep You From Having Contact with Others or Doing Things Outside Your Home? no   Physical Signs of Abuse Present no   Pain   Patient Currently in Pain Yes   Pain Location low back   Pain Rating 10/10   Pain Description Ache;Throbbing;Pressure;Discomfort;Sharp;Deep   Pain Treatment Recommendations Chronic condition   Physical Assessments   Incisions WNL   Edema None   Right Lung Sounds not assessed   Left Lung Sounds not assessed   Limitations Orthopedic   Individualized Treatment Plan   Monitored Sessions Scheduled 24   Monitored Sessions Attended 1   Oxygen   Supplemental Oxygen needed No   Nutrition Management - Weight Management   Assessment Re-assessment   Age 69   Initial Rate Your Plate Score. Dietary tool to assess eating patterns. Scores range from 24 to 72. The higher the score the healthier the eating pattern. 40   Nutrition Management - Lipids   Lipids Labs Not Available   Prescribed Lipid Medication No   Nutrition Management - Diabetes   Diabetes No   Nutrition Management Summary   Dietary Recommendations None   Stages of Change for Diet Compliance Pre-Contemplation   Interventions Planned Attend Nutrition Education Class(es);Educate on Weight Management Principles;Educate on Benefits of Exercise   Nutrition Target Outcome Total Chol < 150, HDL > 40 (M), HDL > 50 (W), LDL < 70, Trig < 150   Psychosocial Management   Psychosocial Assessment Re-assessment   Is there history of clinical depression or increased risk of depression? No previous history   Current Level of Stress per Patient Report Denies   Current Coping Skills Patient Unable to Identify Personal Coping Skills   Initial Patient Health Questionnaire -9 Score (PHQ-9) for depression. 5-9 Minimal symptoms, 10-14 Minor  depression, 15-19 Major depression, moderately severe, > 20 Major depression, severe  9   Initial [facility] Survey score.  Quality of Life:   If total score > 25 review individual areas where patient rated a 4 or 5.  Consider patients current medical condition and what role that plays on the score.   Adjust treatment protocol to improve areas of concern.  Consider the following:  PHQ9 score, DASI, and re-assessment within the next 30 days to assist with developing treatments.  28   Stages of Change Maintenance   Interventions Planned Patient to verbalize understanding of behavioral assessment results;Patient to verbalize understanding of negative impact of stress to personal health;Patient will recognize signs and symptoms of depression   Patient Goal No   Psychosocial Target Outcome Maximize coping skills   Other Core Components - Hypertension   History of or Diagnosis of Hypertension Yes   Currently taking Anti-Hypertensives Yes;CCB   Other Core Components - Tobacco   History of Tobacco Use Yes   Quit Date or Planned Quit Date 11/02/19   Tobacco Use Status Recent (Quit < 6 mo ago)   Tobacco Habit Cigar   Tobacco Use per Day (average) 1 cigar   Years of Tobacco Use 60 years   Stages of Change Action   Other Core Components Summary   Interventions Planned Attend education class on Blood Pressure;List benefits of weight management;Educate on importance of maintaining low sodium diet;Attend education class(es) on Nutrition   Interventions In Progress or Completed Instructed on DASH diet;Educated on importance of maintaining low sodium diet;Initiated referral to [place];Attended Nutrition class(es)   Patient Goals Yes   Goal #1 Description 11/5: Maintain smoke free status.   Goal #1 Target Date 01/05/20   Goal #1 Progress Towards Goal 12/3: Unable to reassess due to patient's absence.  11/5: Pt is a cigar smoker, he quit 3-4 days ago.  He is using Chantix.   Other Core Components Target Outcome Complete  Smoking Cessation   Activity/Exercise History   Activity/Exercise Assessment Re-assessment   Activity/Exercise Status prior to event? Sedentary   Number of Days Currently participating in Moderate Physical Activity? 0   Number of Days Currently performing  Aerobic Exercise (including rehab)? 0   Number of Minutes per Session Currently of Aerobic Exercise (average)? 0   Current Stage of Change (Physical Activity) Pre-Contemplation   Current Stage of Change (Aerobic Exercise) Pre-Contemplation   Patient Goals Goal #1;Goal #2   Goal #1 Description 11/5: increase endurance to be able to go up and down stairs without SOB of fatigue consistantly prior to discharge.   Goal #1 Target Date 01/05/20   Goal #1 Progress Towards Goal 11/5: Instructed patient on proper breathing techniques to use with stair climbing.   Goal #2 Description 11/5: Initiate home exercise program of walking or arm movements for 10 continuous minutes 2-3 times per day through discharge.   Goal #2 Target Date 01/05/20   Goal #2 Progress Towards Goal 11/5: Pt is a  and does very little phycial activity.   Activity/Exercise Target Outcome An Accumulation of 150  Minutes of Aerobic Activity per Week   Exercise Assessment   6 Minute Walk Predicted - Gender Selection Male   Resting HR 63 bpm   Exercise  bpm   Post Exercise HR 63 bpm   Resting /68   Exercise /78   Post Exercise /70   Effort Rating 5   Current MET Level 1.8   MET Level Goal 4-6   ECG Rhythm Atrial fibrillation   Ectopy PVCs   Current Symptoms Weakness   Limitations/Restrictions Orthopedic (see comments)   Exercise Prescription   Mode Treadmill;Nustep;Recumbant bike;Arm Ergometer;Weights   Duration/Time 45-60 min   Frequency 1 day/week   THR (85% of age predicted max HR) 127.5   OMNI Effort Rating (0-10 Scale) 4-6/10   Progression Continuous bouts;Total exercise time of 30-45 minutes;Aerobic exercise to OMNI rating of 5-7, and heart rate at or below  target;Progress peak intensity by 1/2 MET per week   Recommended Home Exercise   Type of Exercise Walking   Frequency (days per week) 5   Duration (minutes per session) 15-30 min   Effort Rating Recommended 4-6/10   30 Day Exercise Plan 11/5: Initiate home exercise program   Current Home Exercise   Type of Exercise None   Follow-up/On-going Support   Provider follow-up needed on the following No follow-up needed   Learning Assessment   Learner Patient   Primary Language English   Preferred Learning Style Listening;Reading;Demonstration;Pictures/Video   Patient Education   Education recommended Anatomy and Physiology of the Heart;Blood Pressure;Exercise Principles;Medication Overview;Muscle Conditioning;Nutrition;Risk Factors;Stress Management   I have established, reviewed and made necessary changes to the individualized treatment plan and exercise prescription for this patient.        Physician Signature: ___________________________________________Date__________

## 2019-12-12 DIAGNOSIS — I10 BENIGN ESSENTIAL HYPERTENSION: Primary | ICD-10-CM

## 2019-12-12 RX ORDER — AMLODIPINE BESYLATE 5 MG/1
5 TABLET ORAL DAILY
Qty: 90 TABLET | Refills: 3 | Status: SHIPPED | OUTPATIENT
Start: 2019-12-12 | End: 2020-01-24

## 2019-12-16 ENCOUNTER — HEALTH MAINTENANCE LETTER (OUTPATIENT)
Age: 69
End: 2019-12-16

## 2019-12-17 ENCOUNTER — TELEPHONE (OUTPATIENT)
Dept: CARDIOLOGY | Facility: CLINIC | Age: 69
End: 2019-12-17

## 2019-12-17 DIAGNOSIS — Z95.2 S/P TAVR (TRANSCATHETER AORTIC VALVE REPLACEMENT): Primary | ICD-10-CM

## 2019-12-17 NOTE — TELEPHONE ENCOUNTER
Attempted to call patient to discuss resuming dual antiplatelet therapy given recent TAVR. The patient was not available, but I was able to speak with his wife Ghislaine who stated they saw his gastroenterologist who strongly recommended against use of Plavix given high risk of significant GI bleeding. His gastroenterologist was okay with ASA 81 mg daily so the patient has continued to take this.     The patient has recently been reporting shortness of breath. I recommended that they make an appt at the [facility] cardiology clinic within the next week but the patient's wife says there is no way they can make the trip down due to her frequent oncology appointments in [place]. I did recommend that they see his PCP either this week or next to further evaluate his shortness of breath, and if things acutely worsen he should seek further evaluation at their local emergency department. Wife verbalized understanding.     Reminded the patient's wife that they have an upcoming appt with cardiology at [facility] on 1/17 with echo prior to appt. She was aware and states they will be there.

## 2019-12-26 ASSESSMENT — 6 MINUTE WALK TEST (6MWT): GENDER SELECTION: MALE

## 2019-12-26 NOTE — PROGRESS NOTES
12/26/19 0900   Session   Session 60 Day Individualized Treatment Plan   Certified through this date 01/24/20   Cardiac Rehab Assessment   Cardiac Rehab Assessment 12/26: Called to verify Pts status and Pt was not home, left message to verify decision on completing cardaic rehab. Pt has not attended since eval. Pt has not attended CR since his inital evaluation on 11/5.  Left message for patient to call for status update.  Pt had not returned call at time of update.     General Information   Treatment Diagnosis Valve Replacement   Date of Treatment Diagnosis 10/23/19   Significant Past CV History Chronic AF   Comorbidities Cerebrovascular Disease   Other Medical History Non obstructive CAD, Afib s/p watchman 2/2019, Chronic low back pain, RLE neuropathy, Hx of AAA, chronic iron deficiency anemia, HTN, HLD, Hx of TIA, migraines, GERD and insomnia   Lead up symptoms Progressive LUCIANO lasting for several months..   Hospital Location [facility]   Hospital Discharge Date 10/24/19   Signs and Symptoms Post Hospital Discharge None   Outpatient Cardiac Rehab Start Date 11/05/19   Primary Physician Joel Dorado   Primary Physician Follow Up Advised to schedule appointment   Surgeon Juan Ramirez   Surgeon Follow Up Advised to schedule appointment   Cardiologist TBA   Ejection Fraction 70%   Risk Stratification Low   Summary of Cath Report   Summary of Cath Report No information available   Living and Work Status    Living Arrangements and Social Status house;significant other   Support System Live with an adult;Check-in help as needed   Return to Employment Yes   Occupation    Preventative Medications   CMS recommended medications Ace inhibitors   Fall Risk Screen   Fall screen completed by Cardiac Rehab   Have you fallen 2 or more times in the past year? No   Have you fallen and had an injury in the past year? No   Is patient a fall risk? No   Abuse Screen (yes response referral indicated)   Feels Threatened by  Someone no   Does Anyone Try to Keep You From Having Contact with Others or Doing Things Outside Your Home? no   Physical Signs of Abuse Present no   Pain   Patient Currently in Pain Yes   Pain Location low back   Pain Rating 10/10   Pain Description Ache;Throbbing;Pressure;Discomfort;Sharp;Deep   Pain Treatment Recommendations Chronic condition   Physical Assessments   Incisions WNL   Edema None   Right Lung Sounds not assessed   Left Lung Sounds not assessed   Limitations Orthopedic   Individualized Treatment Plan   Monitored Sessions Scheduled 24   Monitored Sessions Attended 1   Oxygen   Supplemental Oxygen needed No   Nutrition Management - Weight Management   Assessment Re-assessment   Age 69   Initial Rate Your Plate Score. Dietary tool to assess eating patterns. Scores range from 24 to 72. The higher the score the healthier the eating pattern. 40   Nutrition Management - Lipids   Lipids Labs Not Available   Prescribed Lipid Medication No   Nutrition Management - Diabetes   Diabetes No   Nutrition Management Summary   Dietary Recommendations None   Stages of Change for Diet Compliance Pre-Contemplation   Interventions Planned Attend Nutrition Education Class(es);Educate on Weight Management Principles;Educate on Benefits of Exercise   Nutrition Target Outcome Total Chol < 150, HDL > 40 (M), HDL > 50 (W), LDL < 70, Trig < 150   Psychosocial Management   Psychosocial Assessment Re-assessment   Is there history of clinical depression or increased risk of depression? No previous history   Current Level of Stress per Patient Report Denies   Current Coping Skills Patient Unable to Identify Personal Coping Skills   Initial Patient Health Questionnaire -9 Score (PHQ-9) for depression. 5-9 Minimal symptoms, 10-14 Minor depression, 15-19 Major depression, moderately severe, > 20 Major depression, severe  9   Initial [facility] Survey score.  Quality of Life:   If total score > 25 review individual areas where  patient rated a 4 or 5.  Consider patients current medical condition and what role that plays on the score.   Adjust treatment protocol to improve areas of concern.  Consider the following:  PHQ9 score, DASI, and re-assessment within the next 30 days to assist with developing treatments.  28   Stages of Change Maintenance   Interventions Planned Patient to verbalize understanding of behavioral assessment results;Patient to verbalize understanding of negative impact of stress to personal health;Patient will recognize signs and symptoms of depression   Patient Goal No   Psychosocial Target Outcome Maximize coping skills   Other Core Components - Hypertension   History of or Diagnosis of Hypertension Yes   Currently taking Anti-Hypertensives Yes;CCB   Other Core Components - Tobacco   History of Tobacco Use Yes   Quit Date or Planned Quit Date 11/02/19   Tobacco Use Status Recent (Quit < 6 mo ago)   Tobacco Habit Cigar   Tobacco Use per Day (average) 1 cigar   Years of Tobacco Use 60 years   Stages of Change Action   Other Core Components Summary   Interventions Planned Attend education class on Blood Pressure;List benefits of weight management;Educate on importance of maintaining low sodium diet;Attend education class(es) on Nutrition   Interventions In Progress or Completed Instructed on DASH diet;Educated on importance of maintaining low sodium diet;Initiated referral to [place] Quit Plan;Attended Nutrition class(es)   Patient Goals Yes   Goal #1 Description 11/5: Maintain smoke free status.   Goal #1 Target Date 01/05/20   Goal #1 Progress Towards Goal 12/26: Unable to asses due to Pts absense.12/3: Unable to reassess due to patient's absence.  11/5: Pt is a cigar smoker, he quit 3-4 days ago.  He is using Chantix.   Other Core Components Target Outcome Complete Smoking Cessation   Activity/Exercise History   Activity/Exercise Assessment Re-assessment   Activity/Exercise Status prior to event? Sedentary   Number of Days  Currently participating in Moderate Physical Activity? 0   Number of Days Currently performing  Aerobic Exercise (including rehab)? 0   Number of Minutes per Session Currently of Aerobic Exercise (average)? 0   Current Stage of Change (Physical Activity) Pre-Contemplation   Current Stage of Change (Aerobic Exercise) Pre-Contemplation   Patient Goals Goal #1;Goal #2   Goal #1 Description 11/5: increase endurance to be able to go up and down stairs without SOB of fatigue consistantly prior to discharge.   Goal #1 Target Date 01/05/20   Goal #1 Progress Towards Goal 11/5: Instructed patient on proper breathing techniques to use with stair climbing.   Goal #2 Description 11/5: Initiate home exercise program of walking or arm movements for 10 continuous minutes 2-3 times per day through discharge.   Goal #2 Target Date 01/05/20   Goal #2 Progress Towards Goal 12/26: Unable to asses due to Pts absense. 11/5: Pt is a  and does very little phycial activity.   Activity/Exercise Target Outcome An Accumulation of 150  Minutes of Aerobic Activity per Week   Exercise Assessment   6 Minute Walk Predicted - Gender Selection Male   Resting HR 63 bpm   Exercise  bpm   Post Exercise HR 63 bpm   Resting /68   Exercise /78   Post Exercise /70   Effort Rating 5   Current MET Level 1.8   MET Level Goal 4-6   ECG Rhythm Atrial fibrillation   Ectopy PVCs   Current Symptoms Weakness   Limitations/Restrictions Orthopedic (see comments)   Exercise Prescription   Mode Treadmill;Nustep;Recumbant bike;Arm Ergometer;Weights   Duration/Time 45-60 min   Frequency 1 day/week   THR (85% of age predicted max HR) 127.5   OMNI Effort Rating (0-10 Scale) 4-6/10   Progression Continuous bouts;Total exercise time of 30-45 minutes;Aerobic exercise to OMNI rating of 5-7, and heart rate at or below target;Progress peak intensity by 1/2 MET per week   Recommended Home Exercise   Type of Exercise Walking   Frequency (days per  week) 5   Duration (minutes per session) 15-30 min   Effort Rating Recommended 4-6/10   30 Day Exercise Plan 11/5: Initiate home exercise program   Current Home Exercise   Type of Exercise None   Follow-up/On-going Support   Provider follow-up needed on the following No follow-up needed   Learning Assessment   Learner Patient   Primary Language English   Preferred Learning Style Listening;Reading;Demonstration;Pictures/Video   Patient Education   Education recommended Anatomy and Physiology of the Heart;Blood Pressure;Exercise Principles;Medication Overview;Muscle Conditioning;Nutrition;Risk Factors;Stress Management     I have established, reviewed and made necessary changes to the individualized treatment plan and exercise prescription for this patient.        Physician Signature: __________________________________ Date: ___________

## 2020-01-22 ENCOUNTER — MYC REFILL (OUTPATIENT)
Dept: FAMILY MEDICINE | Facility: CLINIC | Age: 70
End: 2020-01-22

## 2020-01-22 DIAGNOSIS — K92.2 ACUTE UPPER GI BLEED: ICD-10-CM

## 2020-01-22 RX ORDER — FAMOTIDINE 20 MG/1
20 TABLET, FILM COATED ORAL 2 TIMES DAILY
Qty: 60 TABLET | Refills: 1 | OUTPATIENT
Start: 2020-01-22

## 2020-01-22 NOTE — TELEPHONE ENCOUNTER
Please call pharmacy and have them send the request to his primary. Thank you . Adrienne Fan M.D.

## 2020-01-22 NOTE — TELEPHONE ENCOUNTER
Routing refill request to provider for review/approval because:  Did not see that patient has ever been seen at the [facility]. [facility] would not let me route to his PCP,  Joel Dorado.  It seems like it got routed to [facility] as request is attached to Dr. Tyler, Hospitalist; who used to work at [facility].  Edmond Arreguin, RN

## 2020-01-23 NOTE — PROGRESS NOTES
"    [facility] HEART CARE  CARDIOVASCULAR DIVISION    VALVE CLINIC RETURN VISIT    PRIMARY CARDIOLOGIST: Dr. Smyth      PERTINENT CLINICAL HISTORY & REASON FOR CONSULTATION:     Migel Stringer is a very pleasant 69 year old male with severe aortic valvular stenosis that was treated with a transfemoral transcatheter aortic valve replacement (TAVR) with a 34 mm Medtronic Evolut R valve on 10/23/019 by Aletha Diaz, James and Laila. Additional medical hx notable for nonobstructive CAD, HTN, HLD, AAA, hx of gastric bypass, TIA, atrial fibrillation w/intolerance to anticoagulation due to GI bleeding and anemia s/p Watchman 2/2019 and chronic fatigue. The TAVR and post-procedural course were notable for no significant complications. His post TAVR ECG showed AF with HR 60s. Post-TAVR echo showed mean gradient across the aortic valve 7mmHg. The patient was discharged home on ASA and Plavix. He was evaluated 1 month post-TAVR and had stopped both antiplatelet agents due to recurrent GI bleeds. He resumed aspirin therapy per my recommendation but did not  restart Plavix per the advice of his gastroenterologist. He presents to clinic today for 3 month TAVR follow-up.     The patient presents to clinic accompanied by his wife. He states that he hasn't been feeling well. He reports progressive weakness, fatigue, lightheadedness and shortness of breath for about the past month. He also reports chronic nausea/vomiting that has been much worse recently. He can't keep anything down and has lost 12 lbs since I saw him back in November. He notes that his current GI symptoms are similar to when he required gastric stenting in the past. He denies any further episodes of bloody stools. He had a small amount of blood in his emesis last night which has since resolved. From a cardiac standpoint he is okay. He denies chest pain, palpitations, syncope, orthopnea, PND, leg swelling. He continues to be concerned about \"leg " "fatigue\". He is compliant with his medications including ASA, amlodipine 5 mg, atorvastatin 20 mg.     PAST MEDICAL HISTORY:     Past Medical History:   Diagnosis Date     AAA (abdominal aortic aneurysm) (H)      Aortic stenosis      Asthma      Atrial fibrillation (H)      Chronic low back pain      Chronic nausea      GERD (gastroesophageal reflux disease)      Heart murmur      Heart rate problem      History of heparin-induced thrombocytopenia     probably history based on symptoms, repeat titers negative     History of sleep apnea      Hyperlipidemia      Hypertension      Insomnia      Left renal mass      Migraines      Mitral regurgitation      Peripheral neuropathy      Renal mass      Status post gastric bypass for obesity      Ulcer, gastric, acute      Vomiting in adult         PAST SURGICAL HISTORY:     Past Surgical History:   Procedure Laterality Date     CHOLECYSTECTOMY       COLONOSCOPY       CV LEFT ATRIAL APPENDAGE CLOSURE N/A 2/5/2019    Procedure: Left Atrial Appendage Closure;  Surgeon: Michael Diaz MD;  Location: [facility] CARDIAC CATH LAB     CV TRANSCATHETER AORTIC VALVE REPLACEMENT N/A 10/23/2019    Procedure: Femoral Transcatheter  Aortic Valve Replacement with Medtronic Evolut R 34mm Valve, on Cardiopulmonary Bypass Pump Standby, Transthoracic Echocardiogram;  Surgeon: Michael Diaz MD;  Location:  OR     ESOPHAGOGASTRODUODENOSCOPY       ESOPHAGOSCOPY, GASTROSCOPY, DUODENOSCOPY (EGD), COMBINED N/A 10/30/2019    Procedure: ESOPHAGOGASTRODUODENOSCOPY, WITH BIOPSY;  Surgeon: Migel Baldwin MD;  Location: [facility]     GASTRIC BYPASS  2011     HEART CATH FEMORAL CANNULIZATION WITH OPEN STANDBY REPAIR AORTIC VALVE N/A 10/23/2019    Procedure: t;  Surgeon: Theo Ulloa MD;  Location: [facility] OR     HERNIA REPAIR       SPINE SURGERY      hx of 6 lumbar surgeries and 1 thoracic surgery        CURRENT MEDICATIONS:     Current Outpatient Medications   Medication Sig Dispense Refill "     albuterol (PROAIR HFA/PROVENTIL HFA/VENTOLIN HFA) 108 (90 Base) MCG/ACT Inhaler Inhale 1-2 puffs into the lungs every 6 hours as needed        amLODIPine (NORVASC) 5 MG tablet Take 1 tablet (5 mg) by mouth daily 90 tablet 3     aspirin (ASA) 81 MG EC tablet Take 1 tablet (81 mg) by mouth daily 30 tablet 3     atorvastatin (LIPITOR) 20 MG tablet Take 20 mg by mouth At Bedtime        diphenhydrAMINE-acetaminophen (TYLENOL PM)  MG tablet Take 2 tablets by mouth At Bedtime        famotidine (PEPCID) 20 MG tablet Take 1 tablet (20 mg) by mouth 2 times daily 60 tablet 1     fentaNYL (DURAGESIC) 50 mcg/hr 72 hr patch Place 1 patch onto the skin every 72 hours        ferrous sulfate (FE TABS) 325 (65 Fe) MG EC tablet Take 325 mg by mouth daily       omeprazole (PRILOSEC) 40 MG capsule Take 1 capsule (40 mg) by mouth 2 times daily Take 30-60 minutes before a meal. 60 capsule 11     oxyCODONE-acetaminophen (PERCOCET)  MG per tablet Take 1 tablet by mouth every 4 hours Six times daily       pregabalin (LYRICA) 100 MG capsule Take 100 mg by mouth 4 times daily 0400, 1100, 1600, 1800       prochlorperazine (COMPAZINE) 10 MG tablet Take 10 mg by mouth every 8 hours as needed for nausea or vomiting       senna-docusate (SENOKOT-S/PERICOLACE) 8.6-50 MG tablet Take 1 tablet by mouth daily as needed for constipation       SUMAtriptan (IMITREX) 100 MG tablet Take 100 mg by mouth 2 times daily as needed        traZODone (DESYREL) 100 MG tablet Take 100 mg by mouth At Bedtime       varenicline (CHANTIX) 1 MG tablet Take 1 mg by mouth 2 times daily          ALLERGIES:     Allergies   Allergen Reactions     Codeine Hives and Itching     Methadone Nausea        FAMILY HISTORY:     Family History   Problem Relation Age of Onset     Skin Cancer Mother      Chronic Obstructive Pulmonary Disease Mother      Diabetes Mother      Liver Cancer Father      Breast Cancer Father      Breast Cancer Sister      No Known Problems  Brother      Diabetes Maternal Grandmother      Cancer Paternal Grandmother         unspecified cancer     No Known Problems Brother      Cancer Paternal Grandfather         unspecified cancer        SOCIAL HISTORY:     Social History     Socioeconomic History     Marital status:      Spouse name: Not on file     Number of children: 4     Years of education: Not on file     Highest education level: Not on file   Occupational History     Occupation:      Employer: [employer]   Social Needs     Financial resource strain: Not on file     Food insecurity:     Worry: Not on file     Inability: Not on file     Transportation needs:     Medical: Not on file     Non-medical: Not on file   Tobacco Use     Smoking status: Current Every Day Smoker     Packs/day: 1.00     Years: 50.00     Pack years: 50.00     Types: Cigarettes, Cigars     Smokeless tobacco: Never Used     Tobacco comment: quit cigarettes in 2016.  now smokes a small cigar daily   Substance and Sexual Activity     Alcohol use: No     Drug use: No     Sexual activity: Not Currently     Partners: Female     Birth control/protection: None   Lifestyle     Physical activity:     Days per week: Not on file     Minutes per session: Not on file     Stress: Not on file   Relationships     Social connections:     Talks on phone: Not on file     Gets together: Not on file     Attends Adventism service: Not on file     Active member of club or organization: Not on file     Attends meetings of clubs or organizations: Not on file     Relationship status: Not on file     Intimate partner violence:     Fear of current or ex partner: Not on file     Emotionally abused: Not on file     Physically abused: Not on file     Forced sexual activity: Not on file   Other Topics Concern     Not on file   Social History Narrative     Not on file        REVIEW OF SYSTEMS:     Constitutional: No fevers or chills  Skin: No new rash or itching  Eyes: No acute change  "in vision  Ears/Nose/Throat: No purulent rhinorrhea, new hearing loss, or new vertigo  Respiratory: No cough or hemoptysis  Cardiovascular: See HPI, denies palpitation, shortness of breath, dyspnea, has chronic atrial fibrillation  Gastrointestinal: poor appetite, increased nausea and vomiting,   Genitourinary: No dysuria or hematuria  Musculoskeletal: No new back pain, neck pain or muscle pain, complains of BLE fatigue (L>R)  Neurologic: No new headaches, focal weakness or behavior changes  Psychiatric: No hallucinations, excessive alcohol consumption or illegal drug usage  Hematologic/Lymphatic/Immunologic: No bleeding, chills, fever, night sweats or weight loss  Endocrine: No new cold intolerance, heat intolerance, polyphagia, polydipsia or polyuria      PHYSICAL EXAMINATION:     BP (!) 85/53 (BP Location: Right arm, Patient Position: Chair, Cuff Size: Adult Regular)   Pulse 100   Ht 1.778 m (5' 10\")   Wt 68.4 kg (150 lb 11.2 oz)   SpO2 96%   BMI 21.62 kg/m      GENERAL: No acute distress, flat affect  HEENT: EOMI. Sclerae white, not injected. Nares clear. Pharynx without erythema or exudate.   Neck: No adenopathy. No thyromegaly. No jugular venous distension.   Heart: Irregular rate and rhythm. 2/6 systolic murmur LUSB  Lungs: Clear to auscultation. No ronchi, wheezes, rales.   Abdomen: Soft, nontender, nondistended. Bowel sounds present.  Extremities: No clubbing, cyanosis, or edema.   Neurologic: Alert and oriented to person/place/time, normal speech and affect. No focal deficits.  Skin: No petechiae, purpura or rash, bilateral TAVR surgical groin sites clean/dry/intact, soft, no hematoma, no bruit, no thrill     LABORATORY DATA:     LIPID RESULTS:  Lab Results   Component Value Date    CHOL 108 08/02/2018    HDL 42 08/02/2018    LDL 47 08/02/2018    TRIG 91 08/02/2018       LIVER ENZYME RESULTS:  Lab Results   Component Value Date    AST 13 10/23/2019    ALT 12 10/23/2019       CBC RESULTS:  Lab Results "   Component Value Date    WBC 6.3 11/15/2019    RBC 3.76 (L) 11/15/2019    HGB 9.6 (L) 11/15/2019    HCT 33.1 (L) 11/15/2019    MCV 88 11/15/2019    MCH 25.5 (L) 11/15/2019    MCHC 29.0 (L) 11/15/2019    RDW 19.3 (H) 11/15/2019     11/15/2019       BMP RESULTS:  Lab Results   Component Value Date     11/15/2019    POTASSIUM 4.2 11/15/2019    CHLORIDE 109 11/15/2019    CO2 30 11/15/2019    ANIONGAP 2 (L) 11/15/2019    GLC 94 11/15/2019    BUN 8 11/15/2019    CR 0.71 11/15/2019    GFRESTIMATED >90 11/15/2019    GFRESTBLACK >90 11/15/2019    SUZY 7.6 (L) 11/15/2019         PROCEDURES & FURTHER ASSESSMENTS:   ECG dated today: Atrial fibrillation HR 91, normal QRS 80 ms    Echocardiogram today:  Interpretation Summary  Patient in atrial fibrillation.  Left ventricular function is normal.The EF is 60-65%.  Normal RV size and function.  S/P TAVR 34mm Medtronic Evolut R valve. Valve appears well seated. Mild  paravalvular AI at 2 oclock and 6 oclock. Peak velocity 2.1m/sec, mean  gradient 8mmHg  IVC diameter and respiratory changes fall into an intermediate range  suggesting an RA pressure of 8 mmHg.     Compared to prior study done 11/15/2019, there is no significant change.  NYHA Class: III     CLINICAL IMPRESSION:     Migel Stringer is a 69 year old male with severe aortic stenosis treated with transfemoral TAVR with a 34 mm Medtronic Evolut R valve on 10/23/2019 by Aletha Diaz, James and Laila course complicated recurrent GI bleeds, now tolerating ASA monotherapy. He presents to clinic for 3 month TAVR follow-up.    From a cardiac perspective he is doing well. His ECG shows permanent rate controlled a-fib. Echo shows well seated TAVR with mean PG of 8 mmHg with trace paravalvular AI. He is tolerating ASA monotherapy without further episodes of GI bleeding. His hemoglobin in stable today at 10.0.    He reports several noncardiac symptoms today including weakness, fatigue, nausea, vomiting and  weight loss. He is mildly hypotensive in clinic today which I suspect is related to volume depletion. I recommended he increase oral intake and discontinue his amlodipine given hypotension. He should see his PCP within the week as he may need IV fluids. I also advised him to follow-up with his gastroenterologist regarding worsening GI symptoms concerning for possible obstruction.    Plan today:  1. Continue ASA 81 mg daily  2. Increase oral fluids and stop amlodipine due to hypotension   3. See PCP this week, may need IV fluids  4. See GI in the near future regarding worsening GI symptoms.   5. Antibiotic prophylaxis prior to dental procedures  6. Follow-up in valve clinic in 3 months with echo, ecg, labs and FLORES's prior to visit     JUAN CARLOS Fox, CNP  Cardiology Nurse Practitioner  [facility]  Clinic: [phone]  [facility]: [phone]      Patient Care Team:  Joel Dorado as PCP - General (Family Practice)  Nelli Mccallum as Nurse Coordinator (Cardiology)  Diogo Myrick MD as MD (Neurology)  Leslye Calix RN as Nurse Coordinator (Cardiology)  SELF, REFERRED

## 2020-01-24 ENCOUNTER — OFFICE VISIT (OUTPATIENT)
Dept: CARDIOLOGY | Facility: CLINIC | Age: 70
End: 2020-01-24
Attending: NURSE PRACTITIONER
Payer: MEDICARE

## 2020-01-24 ENCOUNTER — ANCILLARY PROCEDURE (OUTPATIENT)
Dept: CARDIOLOGY | Facility: CLINIC | Age: 70
End: 2020-01-24
Attending: NURSE PRACTITIONER
Payer: COMMERCIAL

## 2020-01-24 VITALS
SYSTOLIC BLOOD PRESSURE: 85 MMHG | BODY MASS INDEX: 21.58 KG/M2 | HEIGHT: 70 IN | OXYGEN SATURATION: 96 % | HEART RATE: 100 BPM | DIASTOLIC BLOOD PRESSURE: 53 MMHG | WEIGHT: 150.7 LBS

## 2020-01-24 DIAGNOSIS — Z95.2 S/P TAVR (TRANSCATHETER AORTIC VALVE REPLACEMENT): Primary | ICD-10-CM

## 2020-01-24 DIAGNOSIS — R06.02 SOB (SHORTNESS OF BREATH): ICD-10-CM

## 2020-01-24 DIAGNOSIS — K92.2 ACUTE UPPER GI BLEED: ICD-10-CM

## 2020-01-24 DIAGNOSIS — R53.83 FATIGUE, UNSPECIFIED TYPE: ICD-10-CM

## 2020-01-24 DIAGNOSIS — Z95.2 S/P TAVR (TRANSCATHETER AORTIC VALVE REPLACEMENT): ICD-10-CM

## 2020-01-24 DIAGNOSIS — I73.89 OTHER SPECIFIED PERIPHERAL VASCULAR DISEASES (H): ICD-10-CM

## 2020-01-24 LAB
ANION GAP SERPL CALCULATED.3IONS-SCNC: 5 MMOL/L (ref 3–14)
BUN SERPL-MCNC: 14 MG/DL (ref 7–30)
CALCIUM SERPL-MCNC: 8.6 MG/DL (ref 8.5–10.1)
CHLORIDE SERPL-SCNC: 110 MMOL/L (ref 94–109)
CO2 SERPL-SCNC: 28 MMOL/L (ref 20–32)
CREAT SERPL-MCNC: 1.02 MG/DL (ref 0.66–1.25)
ERYTHROCYTE [DISTWIDTH] IN BLOOD BY AUTOMATED COUNT: 20.7 % (ref 10–15)
GFR SERPL CREATININE-BSD FRML MDRD: 74 ML/MIN/{1.73_M2}
GLUCOSE SERPL-MCNC: 104 MG/DL (ref 70–99)
HCT VFR BLD AUTO: 34 % (ref 40–53)
HGB BLD-MCNC: 10 G/DL (ref 13.3–17.7)
INTERPRETATION ECG - MUSE: NORMAL
MCH RBC QN AUTO: 25.4 PG (ref 26.5–33)
MCHC RBC AUTO-ENTMCNC: 29.4 G/DL (ref 31.5–36.5)
MCV RBC AUTO: 87 FL (ref 78–100)
PLATELET # BLD AUTO: 220 10E9/L (ref 150–450)
POTASSIUM SERPL-SCNC: 4.5 MMOL/L (ref 3.4–5.3)
RBC # BLD AUTO: 3.93 10E12/L (ref 4.4–5.9)
SODIUM SERPL-SCNC: 142 MMOL/L (ref 133–144)
WBC # BLD AUTO: 6.1 10E9/L (ref 4–11)

## 2020-01-24 PROCEDURE — G0463 HOSPITAL OUTPT CLINIC VISIT: HCPCS | Mod: 25,ZF

## 2020-01-24 PROCEDURE — 99215 OFFICE O/P EST HI 40 MIN: CPT | Mod: 25 | Performed by: NURSE PRACTITIONER

## 2020-01-24 PROCEDURE — 85027 COMPLETE CBC AUTOMATED: CPT | Performed by: NURSE PRACTITIONER

## 2020-01-24 PROCEDURE — 36415 COLL VENOUS BLD VENIPUNCTURE: CPT | Performed by: NURSE PRACTITIONER

## 2020-01-24 PROCEDURE — 93010 ELECTROCARDIOGRAM REPORT: CPT | Mod: ZP | Performed by: INTERNAL MEDICINE

## 2020-01-24 PROCEDURE — 93005 ELECTROCARDIOGRAM TRACING: CPT | Mod: ZF

## 2020-01-24 PROCEDURE — 80048 BASIC METABOLIC PNL TOTAL CA: CPT | Performed by: NURSE PRACTITIONER

## 2020-01-24 RX ORDER — FAMOTIDINE 20 MG/1
20 TABLET, FILM COATED ORAL 2 TIMES DAILY
Qty: 60 TABLET | Refills: 1 | Status: SHIPPED | OUTPATIENT
Start: 2020-01-24

## 2020-01-24 ASSESSMENT — MIFFLIN-ST. JEOR: SCORE: 1449.82

## 2020-01-24 ASSESSMENT — PAIN SCALES - GENERAL: PAINLEVEL: NO PAIN (0)

## 2020-01-24 NOTE — NURSING NOTE
Chief Complaint   Patient presents with     Follow Up      2 Month S/P TAVR follow up      Vitals were taken and medications were reconciled. EKG was performed    Katelin MANN  2:07 PM

## 2020-01-24 NOTE — LETTER
1/24/2020      RE: Migel Stringer  [id number]  [facility] [address]       Dear Colleague,    Thank you for the opportunity to participate in the care of your patient, Migel Stringer, at the [facility]. Please see a copy of my visit note below.    [facility] HEART [facility]  CARDIOVASCULAR DIVISION    VALVE CLINIC RETURN VISIT    PRIMARY CARDIOLOGIST: Dr. Smyth      PERTINENT CLINICAL HISTORY & REASON FOR CONSULTATION:     Migel Stringer is a very pleasant 69 year old male with severe aortic valvular stenosis that was treated with a transfemoral transcatheter aortic valve replacement (TAVR) with a 34 mm Medtronic Evolut R valve on 10/23/019 by Aletha Diaz, James and Laila. Additional medical hx notable for nonobstructive CAD, HTN, HLD, AAA, hx of gastric bypass, TIA, atrial fibrillation w/intolerance to anticoagulation due to GI bleeding and anemia s/p Watchman 2/2019 and chronic fatigue. The TAVR and post-procedural course were notable for no significant complications. His post TAVR ECG showed AF with HR 60s. Post-TAVR echo showed mean gradient across the aortic valve 7mmHg. The patient was discharged home on ASA and Plavix. He was evaluated 1 month post-TAVR and had stopped both antiplatelet agents due to recurrent GI bleeds. He resumed aspirin therapy per my recommendation but did not  restart Plavix per the advice of his gastroenterologist. He presents to clinic today for 3 month TAVR follow-up.     The patient presents to clinic accompanied by his wife. He states that he hasn't been feeling well. He reports progressive weakness, fatigue, lightheadedness and shortness of breath for about the past month. He also reports chronic nausea/vomiting that has been much worse recently. He can't keep anything down and has lost 12 lbs since I saw him back in November. He notes that his current GI symptoms are similar to when he  "required gastric stenting in the past. He denies any further episodes of bloody stools. He had a small amount of blood in his emesis last night which has since resolved. From a cardiac standpoint he is okay. He denies chest pain, palpitations, syncope, orthopnea, PND, leg swelling. He continues to be concerned about \"leg fatigue\". He is compliant with his medications including ASA, amlodipine 5 mg, atorvastatin 20 mg.     PAST MEDICAL HISTORY:     Past Medical History:   Diagnosis Date     AAA (abdominal aortic aneurysm) (H)      Aortic stenosis      Asthma      Atrial fibrillation (H)      Chronic low back pain      Chronic nausea      GERD (gastroesophageal reflux disease)      Heart murmur      Heart rate problem      History of heparin-induced thrombocytopenia     probably history based on symptoms, repeat titers negative     History of sleep apnea      Hyperlipidemia      Hypertension      Insomnia      Left renal mass      Migraines      Mitral regurgitation      Peripheral neuropathy      Renal mass      Status post gastric bypass for obesity      Ulcer, gastric, acute      Vomiting in adult         PAST SURGICAL HISTORY:     Past Surgical History:   Procedure Laterality Date     CHOLECYSTECTOMY       COLONOSCOPY       CV LEFT ATRIAL APPENDAGE CLOSURE N/A 2/5/2019    Procedure: Left Atrial Appendage Closure;  Surgeon: Michael Diaz MD;  Location: [facility] CARDIAC CATH LAB     CV TRANSCATHETER AORTIC VALVE REPLACEMENT N/A 10/23/2019    Procedure: Femoral Transcatheter  Aortic Valve Replacement with Medtronic Evolut R 34mm Valve, on Cardiopulmonary Bypass Pump Standby, Transthoracic Echocardiogram;  Surgeon: Michael Diaz MD;  Location: [facility] OR     ESOPHAGOGASTRODUODENOSCOPY       ESOPHAGOSCOPY, GASTROSCOPY, DUODENOSCOPY (EGD), COMBINED N/A 10/30/2019    Procedure: ESOPHAGOGASTRODUODENOSCOPY, WITH BIOPSY;  Surgeon: Migel Baldwin MD;  Location: [facility]     GASTRIC BYPASS  [facility]     HEART CATH FEMORAL " CANNULIZATION WITH OPEN STANDBY REPAIR AORTIC VALVE N/A 10/23/2019    Procedure: t;  Surgeon: Theo Ulloa MD;  Location: [facility] OR     HERNIA REPAIR       SPINE SURGERY      hx of 6 lumbar surgeries and 1 thoracic surgery        CURRENT MEDICATIONS:     Current Outpatient Medications   Medication Sig Dispense Refill     albuterol (PROAIR HFA/PROVENTIL HFA/VENTOLIN HFA) 108 (90 Base) MCG/ACT Inhaler Inhale 1-2 puffs into the lungs every 6 hours as needed        amLODIPine (NORVASC) 5 MG tablet Take 1 tablet (5 mg) by mouth daily 90 tablet 3     aspirin (ASA) 81 MG EC tablet Take 1 tablet (81 mg) by mouth daily 30 tablet 3     atorvastatin (LIPITOR) 20 MG tablet Take 20 mg by mouth At Bedtime        diphenhydrAMINE-acetaminophen (TYLENOL PM)  MG tablet Take 2 tablets by mouth At Bedtime        famotidine (PEPCID) 20 MG tablet Take 1 tablet (20 mg) by mouth 2 times daily 60 tablet 1     fentaNYL (DURAGESIC) 50 mcg/hr 72 hr patch Place 1 patch onto the skin every 72 hours        ferrous sulfate (FE TABS) 325 (65 Fe) MG EC tablet Take 325 mg by mouth daily       omeprazole (PRILOSEC) 40 MG capsule Take 1 capsule (40 mg) by mouth 2 times daily Take 30-60 minutes before a meal. 60 capsule 11     oxyCODONE-acetaminophen (PERCOCET)  MG per tablet Take 1 tablet by mouth every 4 hours Six times daily       pregabalin (LYRICA) 100 MG capsule Take 100 mg by mouth 4 times daily 0400, 1100, 1600, 1800       prochlorperazine (COMPAZINE) 10 MG tablet Take 10 mg by mouth every 8 hours as needed for nausea or vomiting       senna-docusate (SENOKOT-S/PERICOLACE) 8.6-50 MG tablet Take 1 tablet by mouth daily as needed for constipation       SUMAtriptan (IMITREX) 100 MG tablet Take 100 mg by mouth 2 times daily as needed        traZODone (DESYREL) 100 MG tablet Take 100 mg by mouth At Bedtime       varenicline (CHANTIX) 1 MG tablet Take 1 mg by mouth 2 times daily          ALLERGIES:     Allergies   Allergen Reactions      Codeine Hives and Itching     Methadone Nausea        FAMILY HISTORY:     Family History   Problem Relation Age of Onset     Skin Cancer Mother      Chronic Obstructive Pulmonary Disease Mother      Diabetes Mother      Liver Cancer Father      Breast Cancer Father      Breast Cancer Sister      No Known Problems Brother      Diabetes Maternal Grandmother      Cancer Paternal Grandmother         unspecified cancer     No Known Problems Brother      Cancer Paternal Grandfather         unspecified cancer        SOCIAL HISTORY:     Social History     Socioeconomic History     Marital status:      Spouse name: Not on file     Number of children: 4     Years of education: Not on file     Highest education level: Not on file   Occupational History     Occupation:      Employer: [employer]   Social Needs     Financial resource strain: Not on file     Food insecurity:     Worry: Not on file     Inability: Not on file     Transportation needs:     Medical: Not on file     Non-medical: Not on file   Tobacco Use     Smoking status: Current Every Day Smoker     Packs/day: 1.00     Years: 50.00     Pack years: 50.00     Types: Cigarettes, Cigars     Smokeless tobacco: Never Used     Tobacco comment: quit cigarettes in 2016.  now smokes a small cigar daily   Substance and Sexual Activity     Alcohol use: No     Drug use: No     Sexual activity: Not Currently     Partners: Female     Birth control/protection: None   Lifestyle     Physical activity:     Days per week: Not on file     Minutes per session: Not on file     Stress: Not on file   Relationships     Social connections:     Talks on phone: Not on file     Gets together: Not on file     Attends Sabianism service: Not on file     Active member of club or organization: Not on file     Attends meetings of clubs or organizations: Not on file     Relationship status: Not on file     Intimate partner violence:     Fear of current or ex partner: Not  "on file     Emotionally abused: Not on file     Physically abused: Not on file     Forced sexual activity: Not on file   Other Topics Concern     Not on file   Social History Narrative     Not on file        REVIEW OF SYSTEMS:     Constitutional: No fevers or chills  Skin: No new rash or itching  Eyes: No acute change in vision  Ears/Nose/Throat: No purulent rhinorrhea, new hearing loss, or new vertigo  Respiratory: No cough or hemoptysis  Cardiovascular: See HPI, denies palpitation, shortness of breath, dyspnea, has chronic atrial fibrillation  Gastrointestinal: poor appetite, increased nausea and vomiting,   Genitourinary: No dysuria or hematuria  Musculoskeletal: No new back pain, neck pain or muscle pain, complains of BLE fatigue (L>R)  Neurologic: No new headaches, focal weakness or behavior changes  Psychiatric: No hallucinations, excessive alcohol consumption or illegal drug usage  Hematologic/Lymphatic/Immunologic: No bleeding, chills, fever, night sweats or weight loss  Endocrine: No new cold intolerance, heat intolerance, polyphagia, polydipsia or polyuria      PHYSICAL EXAMINATION:     BP (!) 85/53 (BP Location: Right arm, Patient Position: Chair, Cuff Size: Adult Regular)   Pulse 100   Ht 1.778 m (5' 10\")   Wt 68.4 kg (150 lb 11.2 oz)   SpO2 96%   BMI 21.62 kg/m       GENERAL: No acute distress, flat affect  HEENT: EOMI. Sclerae white, not injected. Nares clear. Pharynx without erythema or exudate.   Neck: No adenopathy. No thyromegaly. No jugular venous distension.   Heart: Irregular rate and rhythm. 2/6 systolic murmur LUSB  Lungs: Clear to auscultation. No ronchi, wheezes, rales.   Abdomen: Soft, nontender, nondistended. Bowel sounds present.  Extremities: No clubbing, cyanosis, or edema.   Neurologic: Alert and oriented to person/place/time, normal speech and affect. No focal deficits.  Skin: No petechiae, purpura or rash, bilateral TAVR surgical groin sites clean/dry/intact, soft, no hematoma, " no bruit, no thrill     LABORATORY DATA:     LIPID RESULTS:  Lab Results   Component Value Date    CHOL 108 08/02/2018    HDL 42 08/02/2018    LDL 47 08/02/2018    TRIG 91 08/02/2018       LIVER ENZYME RESULTS:  Lab Results   Component Value Date    AST 13 10/23/2019    ALT 12 10/23/2019       CBC RESULTS:  Lab Results   Component Value Date    WBC 6.3 11/15/2019    RBC 3.76 (L) 11/15/2019    HGB 9.6 (L) 11/15/2019    HCT 33.1 (L) 11/15/2019    MCV 88 11/15/2019    MCH 25.5 (L) 11/15/2019    MCHC 29.0 (L) 11/15/2019    RDW 19.3 (H) 11/15/2019     11/15/2019       BMP RESULTS:  Lab Results   Component Value Date     11/15/2019    POTASSIUM 4.2 11/15/2019    CHLORIDE 109 11/15/2019    CO2 30 11/15/2019    ANIONGAP 2 (L) 11/15/2019    GLC 94 11/15/2019    BUN 8 11/15/2019    CR 0.71 11/15/2019    GFRESTIMATED >90 11/15/2019    GFRESTBLACK >90 11/15/2019    SUZY 7.6 (L) 11/15/2019         PROCEDURES & FURTHER ASSESSMENTS:   ECG dated today: Atrial fibrillation HR 91, normal QRS 80 ms    Echocardiogram today:  Interpretation Summary  Patient in atrial fibrillation.  Left ventricular function is normal.The EF is 60-65%.  Normal RV size and function.  S/P TAVR 34mm Medtronic Evolut R valve. Valve appears well seated. Mild  paravalvular AI at 2 oclock and 6 oclock. Peak velocity 2.1m/sec, mean  gradient 8mmHg  IVC diameter and respiratory changes fall into an intermediate range  suggesting an RA pressure of 8 mmHg.     Compared to prior study done 11/15/2019, there is no significant change.  NYHA Class: III     CLINICAL IMPRESSION:     Migel Stringer is a 69 year old male with severe aortic stenosis treated with transfemoral TAVR with a 34 mm Medtronic Evolut R valve on 10/23/2019 by James Mayorga and Laila course complicated recurrent GI bleeds, now tolerating ASA monotherapy. He presents to clinic for 3 month TAVR follow-up.    From a cardiac perspective he is doing well. His ECG shows  permanent rate controlled a-fib. Echo shows well seated TAVR with mean PG of 8 mmHg with trace paravalvular AI. He is tolerating ASA monotherapy without further episodes of GI bleeding. His hemoglobin in stable today at 10.0.    He reports several noncardiac symptoms today including weakness, fatigue, nausea, vomiting and weight loss. He is mildly hypotensive in clinic today which I suspect is related to volume depletion. I recommended he increase oral intake and discontinue his amlodipine given hypotension. He should see his PCP within the week as he may need IV fluids. I also advised him to follow-up with his gastroenterologist regarding worsening GI symptoms concerning for possible obstruction.    Plan today:  1. Continue ASA 81 mg daily  2. Increase oral fluids and stop amlodipine due to hypotension   3. See PCP this week, may need IV fluids  4. See GI in the near future regarding worsening GI symptoms.   5. Antibiotic prophylaxis prior to dental procedures  6. Follow-up in valve clinic in 3 months with echo, ecg, labs and FLORES's prior to visit     JUAN CARLOS Fox, CNP  Cardiology Nurse Practitioner  [facility]  Clinic: [phone]  Hospital: [phone]      Patient Care Team:  Joel Dorado as PCP - General (Family Practice)  Nelli Mccallum as Nurse Coordinator (Cardiology)  Diogo Myrick MD as MD (Neurology)  Leslye Calix, RN as Nurse Coordinator (Cardiology)  SELF, REFERRED

## 2020-01-24 NOTE — PATIENT INSTRUCTIONS
You were seen today in the Cardiovascular Clinic at the [facility].    Cardiology provider you saw during your visit Janett Brandon NP.    Plan today:  1. Continue ASA 81 mg daily  2. Remember antibiotic prophylaxis prior to any dental procedure  3. Stop amlodipine given low blood pressures  4. Get CBC and BMP today - suspect your blood count may be low   5. Increase water intake to avoid dehydration  6. See PCP next week regarding nausea, vomiting, weight loss  7. Follow-up in valve clinic in 3 months with echo, ecg, labs and FLORES's prior to visit     Questions and [phone].   First press #1 for the University and then press #3 for Medical Questions to reach the Cardiology triage nurse.     On Call Cardiologist for after hours or on weekends: [phone], press option #4 and ask to speak to the on-call Cardiologist.       Prevention of Infective (Bacterial) Endocarditis:  You are at increased risk for developing adverse outcomes from infective endocarditis (IE), also known as bacterial endocarditis (BE) because of the new device in your heart. The guidelines for prevention of IE are to give patients antibiotics prior to any dental procedures that involve manipulation of gingival tissue or the periapical region of teeth, or perforation of the oral mucosa:      It is recommended to take Amoxicillin 2 gm by mouth as a single dose 30 to 60 minutes before procedure.     OR if allergic to Penicillin or Ampicillin:     Cephalexin 2 gm by mouth, or  Clindamycin 600 mg by mouth, or  Azithromycin or Clarithromycin 500 mg PO

## 2020-02-24 NOTE — PROGRESS NOTES
Cardiac Rehab Discharge Summary    Reason for discharge:    Pt did not return since his initial evaluation in Nov.    Progress towards goals:  Goals not met.  Barriers to achieving goals:   limited tolerance for therapy.    Recommendation(s):    Patient only attended initial evaluation. We left a few messages with no return call back with status.  Pt will be officially discharged from phase 2 cardiac rehab.     Physician cosignature/electronic signature indicates agreements with the ITP document and approval of discharge.

## 2020-04-10 DIAGNOSIS — K92.2 ACUTE UPPER GI BLEED: ICD-10-CM

## 2020-04-14 NOTE — TELEPHONE ENCOUNTER
famotidine (PEPCID) 40 MG tablet       Last Written Prescription Date:  1-  Last Fill Quantity: 60,   # refills: 1 (20 mg tabs same total dose)  Last Office Visit : 1-  Future Office visit:  4-    Routing refill request to provider for review/approval because:  Not on protocol.      Kathleen M Doege RN

## 2020-04-14 NOTE — PROGRESS NOTES
"Migel Stringer is a 70 year old male who is being evaluated via a billable telephone visit.      The patient has been notified of following:     \"This telephone visit will be conducted via a call between you and your physician/provider. We have found that certain health care needs can be provided without the need for a physical exam.  This service lets us provide the care you need with a short phone conversation.  If a prescription is necessary we can send it directly to your pharmacy.  If lab work is needed we can place an order for that and you can then stop by our lab to have the test done at a later time.    If during the course of the call the physician/provider feels a telephone visit is not appropriate, you will not be charged for this service.\"     Patient has given verbal consent for Telephone visit?  Yes     Migel Stringer is being evaluated for 6 month TAVR follow-up.    HPI:  Migel Stringer is a very pleasant 70 year old male with severe aortic valvular stenosis that was treated with a transfemoral transcatheter aortic valve replacement (TAVR) with a 34 mm Medtronic Evolut R valve on 10/23/019 by Aletha Diaz, James and Laila. Additional medical hx notable for HTN, HLD, tobacco use (60 years), nonobstructive CAD, AAA, hx of gastric bypass w/recurrent strictures s/p gastric stenting, TIA, atrial fibrillation w/intolerance to AC due to GI bleeding s/p Watchman 2/2019 and chronic fatigue. The TAVR and post-procedural course were w/out major complications. His post-TAVR echo showed mean gradient across the aortic valve 7mmHg. He was unable to tolerate DAPT due to recurrent GI bleeds and has been maintained on ASA monotherapy. He was last evaluated 3 month post TAVR and reported dyspnea, weakness, fatigue, N/V and anorexia. ECHO, ECG and labs were within normal limits. He was advised to follow-up with GI and underwent gastric stenting 2/2020.     Patient reports improvement in GI " symptoms since he underwent gastric stenting. He is able to eat/drink now without difficultly and is maintaining a stable weight. He states that he continues to experience weakness, fatigue and shortness of breath. He feels his shortness of breath has worsened since I last saw him in January. He is now only able to walk about 30 yards before having to stop and rest due to shortness of breath. He denies chest pain or pressure. No orthopnea, PND, leg swelling or weight gain. No palpitations, lightheadedness or syncope. He is compliant with his medications including ASA 81 mg daily and atorvastatin 20 mg daily.     Past Medical History:   Past Medical History:   Diagnosis Date     AAA (abdominal aortic aneurysm) (H)      Aortic stenosis      Asthma      Atrial fibrillation (H)      Chronic low back pain      Chronic nausea      GERD (gastroesophageal reflux disease)      Heart murmur      Heart rate problem      History of heparin-induced thrombocytopenia     probably history based on symptoms, repeat titers negative     History of sleep apnea      Hyperlipidemia      Hypertension      Insomnia      Left renal mass      Migraines      Mitral regurgitation      Peripheral neuropathy      Renal mass      Status post gastric bypass for obesity      Ulcer, gastric, acute      Vomiting in adult        I have reviewed the patient's social History and family History:    Medication List.  Current Outpatient Medications   Medication     albuterol (PROAIR HFA/PROVENTIL HFA/VENTOLIN HFA) 108 (90 Base) MCG/ACT Inhaler     aspirin (ASA) 81 MG EC tablet     atorvastatin (LIPITOR) 20 MG tablet     fentaNYL (DURAGESIC) 50 mcg/hr 72 hr patch     omeprazole (PRILOSEC) 40 MG capsule     pregabalin (LYRICA) 100 MG capsule     prochlorperazine (COMPAZINE) 10 MG tablet     SUMAtriptan (IMITREX) 100 MG tablet     traZODone (DESYREL) 100 MG tablet     varenicline (CHANTIX) 1 MG tablet     diphenhydrAMINE-acetaminophen (TYLENOL PM)  MG  tablet     famotidine (PEPCID) 20 MG tablet     ferrous sulfate (FE TABS) 325 (65 Fe) MG EC tablet     oxyCODONE-acetaminophen (PERCOCET)  MG per tablet     senna-docusate (SENOKOT-S/PERICOLACE) 8.6-50 MG tablet     No current facility-administered medications for this visit.        ALLERGIES  Codeine and Methadone    ROS:  Review Of Systems  Skin: negative  Eyes: negative  Ears/Nose/Throat: negative  Respiratory: Shortness of breath- with exertion, worsening  Cardiovascular: See HPI  Gastrointestinal: See HPI  Genitourinary: negative  Musculoskeletal: negative  Neurologic: negative  Psychiatric: negative  Hematologic/Lymphatic/Immunologic: negative  Endocrine: negative    Diagnostics:     ECHO 1/2020:   Interpretation Summary  Patient in atrial fibrillation.     Left ventricular function is normal.The EF is 60-65%.  Normal RV size and function.  S/P TAVR 34mm Medtronic Evolut R valve. Valve appears well seated. Mild  paravalvular AI at 2 oclock and 6 oclock. Peak velocity 2.1m/sec, mean  gradient 8mmHg  IVC diameter and respiratory changes fall into an intermediate range  suggesting an RA pressure of 8 mmHg.     Compared to prior study done 11/15/2019, there is no significant change.    Assessment/Plan:  Migel Stringer is a 70 year old male with a complex medical history including severe aortic stenosis treated with transfemoral TAVR with a 34 mm Medtronic Evolut R valve on 10/23/2019 by Aletha Diaz, James and Laila course complicated recurrent GI bleeding on DAPT, now tolerating ASA monotherapy. He was last evaluated 3 months post-TAVR and echo showed well seated valve with mean PG of 8 mmHg with trace paravalvular AI. He is being evaluated for 6 month TAVR follow-up.      Randall states that he continues to have severe weakness, fatigue and shortness of breath with exertion. He reported similar symptoms 3 months ago and his ECHO showed normal valve function, ECG and labs WNL. He states that his  shortness of breath has worsened since then, now only able to walk about 30 yards before having to stop due to shortness of breath. He denies s/s of CHF - no orthopnea, PND, weight gain, leg swelling. Less likely CAD given nonobstructive CAD seen on cath pre-TAVR. Will obtain repeat echo to rule-out valvular dysfunction as the cause of his symptoms. If no cardiac etiology identified, I will refer him to pulmonology. He reports a 60 pack year smoking history and likely has underlying lung disease contributing to his dyspnea. His weakness and fatigue may be related to malnutrition vs chronic anemia. He should continue to follow with PCP and gastroenterologist for management of these conditions.      Plan today:  1. Continue ASA 81 mg daily  2. Antibiotic prophylaxis prior to dental procedures  3. Repeat echo to r/out cardiac etiology of dyspnea (to be performed at [facility])  4. If normal echo, will refer to pulmonology for further evaluation of symptoms  5. Recommended smoking cessation  6. Follow-up in valve clinic in 6 months with echo, ecg, labs prior to visit     Phone call duration:  25 minutes    Janett Brandon NP

## 2020-04-17 ENCOUNTER — TELEPHONE (OUTPATIENT)
Dept: CARDIOLOGY | Facility: CLINIC | Age: 70
End: 2020-04-17

## 2020-04-17 ENCOUNTER — VIRTUAL VISIT (OUTPATIENT)
Dept: CARDIOLOGY | Facility: CLINIC | Age: 70
End: 2020-04-17
Attending: NURSE PRACTITIONER
Payer: COMMERCIAL

## 2020-04-17 DIAGNOSIS — R06.02 SOB (SHORTNESS OF BREATH): Primary | ICD-10-CM

## 2020-04-17 DIAGNOSIS — Z95.2 S/P TAVR (TRANSCATHETER AORTIC VALVE REPLACEMENT): ICD-10-CM

## 2020-04-17 PROCEDURE — 99214 OFFICE O/P EST MOD 30 MIN: CPT | Mod: 95 | Performed by: NURSE PRACTITIONER

## 2020-04-17 ASSESSMENT — PAIN SCALES - GENERAL: PAINLEVEL: NO PAIN (0)

## 2020-04-17 NOTE — PATIENT INSTRUCTIONS
You were seen today in the Cardiovascular Clinic at the [facility].    Cardiology provider you saw during your visit Janett Brandon NP.    1. Continue ASA and statin therapy.  2. Repeat ECHO in [place], we will fax the order.  3. If normal echo, will refer you to pulmonology.  4. Please make a follow-up appointment in 6 months with echo, ecg and labs prior.    Questions and [phone].   First press #1 for the University and then press #3 for Medical Questions to reach the Cardiology triage nurse.     On Call Cardiologist for after hours or on weekends: [phone], press option #4 and ask to speak to the on-call Cardiologist.

## 2020-04-17 NOTE — LETTER
"4/17/2020      RE: Migel Stringer  [address]  [facility] [address]       Dear Colleague,    Thank you for the opportunity to participate in the care of your patient, Migel Stringer, at the [facility]. Please see a copy of my visit note below.    Migel Stringer is a 70 year old male who is being evaluated via a billable telephone visit.      The patient has been notified of following:     \"This telephone visit will be conducted via a call between you and your physician/provider. We have found that certain health care needs can be provided without the need for a physical exam.  This service lets us provide the care you need with a short phone conversation.  If a prescription is necessary we can send it directly to your pharmacy.  If lab work is needed we can place an order for that and you can then stop by our lab to have the test done at a later time.    If during the course of the call the physician/provider feels a telephone visit is not appropriate, you will not be charged for this service.\"     Patient has given verbal consent for Telephone visit?  Yes     Migel Stringer is being evaluated for 6 month TAVR follow-up.    HPI:  Migel Stringer is a very pleasant 70 year old male with severe aortic valvular stenosis that was treated with a transfemoral transcatheter aortic valve replacement (TAVR) with a 34 mm Medtronic Evolut R valve on 10/23/019 by James Mayorga and Laila. Additional medical hx notable for HTN, HLD, tobacco use (60 years), nonobstructive CAD, AAA, hx of gastric bypass w/recurrent strictures s/p gastric stenting, TIA, atrial fibrillation w/intolerance to AC due to GI bleeding s/p Watchman 2/2019 and chronic fatigue. The TAVR and post-procedural course were w/out major complications. His post-TAVR echo showed mean gradient across the aortic valve 7mmHg. He was unable to tolerate DAPT due " to recurrent GI bleeds and has been maintained on ASA monotherapy. He was last evaluated 3 month post TAVR and reported dyspnea, weakness, fatigue, N/V and anorexia. ECHO, ECG and labs were within normal limits. He was advised to follow-up with GI and underwent gastric stenting 2/2020.     Patient reports improvement in GI symptoms since he underwent gastric stenting. He is able to eat/drink now without difficultly and is maintaining a stable weight. He states that he continues to experience weakness, fatigue and shortness of breath. He feels his shortness of breath has worsened since I last saw him in January. He is now only able to walk about 30 yards before having to stop and rest due to shortness of breath. He denies chest pain or pressure. No orthopnea, PND, leg swelling or weight gain. No palpitations, lightheadedness or syncope. He is compliant with his medications including ASA 81 mg daily and atorvastatin 20 mg daily.     Past Medical History:   Past Medical History:   Diagnosis Date     AAA (abdominal aortic aneurysm) (H)      Aortic stenosis      Asthma      Atrial fibrillation (H)      Chronic low back pain      Chronic nausea      GERD (gastroesophageal reflux disease)      Heart murmur      Heart rate problem      History of heparin-induced thrombocytopenia     probably history based on symptoms, repeat titers negative     History of sleep apnea      Hyperlipidemia      Hypertension      Insomnia      Left renal mass      Migraines      Mitral regurgitation      Peripheral neuropathy      Renal mass      Status post gastric bypass for obesity      Ulcer, gastric, acute      Vomiting in adult        I have reviewed the patient's social History and family History:    Medication List.  Current Outpatient Medications   Medication     albuterol (PROAIR HFA/PROVENTIL HFA/VENTOLIN HFA) 108 (90 Base) MCG/ACT Inhaler     aspirin (ASA) 81 MG EC tablet     atorvastatin (LIPITOR) 20 MG tablet     fentaNYL  (DURAGESIC) 50 mcg/hr 72 hr patch     omeprazole (PRILOSEC) 40 MG capsule     pregabalin (LYRICA) 100 MG capsule     prochlorperazine (COMPAZINE) 10 MG tablet     SUMAtriptan (IMITREX) 100 MG tablet     traZODone (DESYREL) 100 MG tablet     varenicline (CHANTIX) 1 MG tablet     diphenhydrAMINE-acetaminophen (TYLENOL PM)  MG tablet     famotidine (PEPCID) 20 MG tablet     ferrous sulfate (FE TABS) 325 (65 Fe) MG EC tablet     oxyCODONE-acetaminophen (PERCOCET)  MG per tablet     senna-docusate (SENOKOT-S/PERICOLACE) 8.6-50 MG tablet     No current facility-administered medications for this visit.        ALLERGIES  Codeine and Methadone    ROS:  Review Of Systems  Skin: negative  Eyes: negative  Ears/Nose/Throat: negative  Respiratory: Shortness of breath- with exertion, worsening  Cardiovascular: See HPI  Gastrointestinal: See HPI  Genitourinary: negative  Musculoskeletal: negative  Neurologic: negative  Psychiatric: negative  Hematologic/Lymphatic/Immunologic: negative  Endocrine: negative    Diagnostics:     ECHO 1/2020:   Interpretation Summary  Patient in atrial fibrillation.     Left ventricular function is normal.The EF is 60-65%.  Normal RV size and function.  S/P TAVR 34mm Medtronic Evolut R valve. Valve appears well seated. Mild  paravalvular AI at 2 oclock and 6 oclock. Peak velocity 2.1m/sec, mean  gradient 8mmHg  IVC diameter and respiratory changes fall into an intermediate range  suggesting an RA pressure of 8 mmHg.     Compared to prior study done 11/15/2019, there is no significant change.    Assessment/Plan:  Migel Stringer is a 70 year old male with a complex medical history including severe aortic stenosis treated with transfemoral TAVR with a 34 mm Medtronic Evolut R valve on 10/23/2019 by James Mayorga and Laila course complicated recurrent GI bleeding on DAPT, now tolerating ASA monotherapy. He was last evaluated 3 months post-TAVR and echo showed well seated valve  with mean PG of 8 mmHg with trace paravalvular AI. He is being evaluated for 6 month TAVR follow-up.      Randall states that he continues to have severe weakness, fatigue and shortness of breath with exertion. He reported similar symptoms 3 months ago and his ECHO showed normal valve function, ECG and labs WNL. He states that his shortness of breath has worsened since then, now only able to walk about 30 yards before having to stop due to shortness of breath. He denies s/s of CHF - no orthopnea, PND, weight gain, leg swelling. Less likely CAD given nonobstructive CAD seen on cath pre-TAVR. Will obtain repeat echo to rule-out valvular dysfunction as the cause of his symptoms. If no cardiac etiology identified, I will refer him to pulmonology. He reports a 60 pack year smoking history and likely has underlying lung disease contributing to his dyspnea. His weakness and fatigue may be related to malnutrition vs chronic anemia. He should continue to follow with PCP and gastroenterologist for management of these conditions.      Plan today:  1. Continue ASA 81 mg daily  2. Antibiotic prophylaxis prior to dental procedures  3. Repeat echo to r/out cardiac etiology of dyspnea (to be performed at [facility])  4. If normal echo, will refer to pulmonology for further evaluation of symptoms  5. Recommended smoking cessation  6. Follow-up in valve clinic in 6 months with echo, ecg, labs prior to visit     Phone call duration:  25 minutes    Janett Brandon NP

## 2020-04-17 NOTE — TELEPHONE ENCOUNTER
Echocardiogram order faxed to [facility] in [facility] [phone]. Per Janett and patient request to have it done there.

## 2020-06-11 RX ORDER — FAMOTIDINE 40 MG/1
TABLET, FILM COATED ORAL
Qty: 30 TABLET | Refills: 1 | OUTPATIENT
Start: 2020-06-11

## 2020-06-16 NOTE — PROGRESS NOTES
Nutrition Brief Note:    Patient with hx RNY gastric bypass in 2011. Patient was scheduled for RD visit today. Patient met with MD prior to scheduled RD visit today, and MD determined that RD visit not needed at this time.     Yu Koehler, MS, RD, LD       Subjective:       Patient ID: Liang Monterroso Jr. is a 77 y.o. male.    Chief Complaint: No chief complaint on file.    HPI patient has meatal stenosis Peyronie's plaques and neurogenic bladder.  He does CIC 3 times a day.  His getting up to 300 cc.  He does have meatal stenosis in the catheterization of will keep that open.  He has also noticed a hard knot on left side of his penis with curvature when he gets erect.  We discussed Peyronie's plaques.    Past Medical History:   Diagnosis Date    Allergy     CKD (chronic kidney disease) stage 3, GFR 30-59 ml/min 6/15/2016    GERD (gastroesophageal reflux disease)     Hx of colonic polyp     Hyperlipidemia     Hypertension     Hypospadias in male     Hypothyroidism     Sleep apnea     Thyroid disease     Urinary tract infection        Past Surgical History:   Procedure Laterality Date    APPENDECTOMY      CATARACT EXTRACTION      EYE SURGERY      HERNIA REPAIR      TONSILLECTOMY         Family History   Problem Relation Age of Onset    Diabetes Mother     Heart disease Mother     Hypertension Mother     Vision loss Mother     Dementia Mother     Alcohol abuse Father     Cancer Sister         lung with mets, was a heavy smoker    No Known Problems Daughter     No Known Problems Son     No Known Problems Daughter     No Known Problems Son     Amblyopia Neg Hx     Blindness Neg Hx     Cataracts Neg Hx     Glaucoma Neg Hx     Macular degeneration Neg Hx     Retinal detachment Neg Hx     Strabismus Neg Hx     Stroke Neg Hx     Thyroid disease Neg Hx        Social History     Socioeconomic History    Marital status:      Spouse name: parul    Number of children: 4    Years of education: Not on file    Highest education level: Not on file   Occupational History    Occupation: retired   Social Needs    Financial resource strain: Not hard at all    Food insecurity     Worry: Never true     Inability: Never true     Transportation needs     Medical: No     Non-medical: No   Tobacco Use    Smoking status: Never Smoker    Smokeless tobacco: Never Used   Substance and Sexual Activity    Alcohol use: Yes     Frequency: 2-4 times a month     Drinks per session: 1 or 2     Binge frequency: Never     Comment: 1-3 glasses wine weekly, 2-3 beers weekly    Drug use: No    Sexual activity: Yes     Partners: Female   Lifestyle    Physical activity     Days per week: 3 days     Minutes per session: 80 min    Stress: Not at all   Relationships    Social connections     Talks on phone: Twice a week     Gets together: Once a week     Attends Christianity service: Not on file     Active member of club or organization: No     Attends meetings of clubs or organizations: Never     Relationship status:    Other Topics Concern    Not on file   Social History Narrative    Not on file       Allergies:  Contrast media    Medications:    Current Outpatient Medications:     albuterol (VENTOLIN HFA) 90 mcg/actuation inhaler, Inhale 2 puffs into the lungs every 6 (six) hours as needed. Rescue, Disp: 18 g, Rfl: 0    aspirin (ECOTRIN) 81 MG EC tablet, Take 81 mg by mouth once daily., Disp: , Rfl:     econazole nitrate 1 % cream, USE TWICE DAILY (Patient taking differently: USE TWICE DAILY (prn)), Disp: 60 g, Rfl: 5    fluticasone propionate (FLONASE) 50 mcg/actuation nasal spray, 1 spray (50 mcg total) by Each Nostril route once daily., Disp: 1 Bottle, Rfl: 0    levothyroxine (SYNTHROID) 112 MCG tablet, TAKE 1 TABLET BY MOUTH BEFORE BREAKFAST, Disp: 90 tablet, Rfl: 12    metronidazole (METROGEL) 0.75 % gel, Use hs, Disp: 45 g, Rfl: 3    multivitamin with minerals tablet, Take 1 tablet by mouth once daily.  , Disp: , Rfl:     polyethylene glycol (GLYCOLAX) 17 gram PwPk, Take 17 g by mouth once daily., Disp: 30 packet, Rfl: 0    pravastatin (PRAVACHOL) 20 MG tablet, TAKE 1 TABLET BY MOUTH ONE TIME DAILY, Disp: 90 tablet, Rfl: 4     ramipriL (ALTACE) 5 MG capsule, TAKE 1 CAPSULE(5 MG) BY MOUTH EVERY DAY, Disp: 90 capsule, Rfl: 0    tamsulosin (FLOMAX) 0.4 mg Cap, TAKE 2 CAPSULES BY MOUTH EVERY DAY, Disp: 180 capsule, Rfl: 0    triamcinolone acetonide 0.1% (KENALOG) 0.1 % cream, Use bid prn rash, Disp: 80 g, Rfl: 3    fexofenadine (ALLEGRA) 180 MG tablet, Take 1 tablet (180 mg total) by mouth once daily. (Patient taking differently: Take 180 mg by mouth daily as needed. ), Disp: 30 tablet, Rfl: 11    methylPREDNISolone (MEDROL DOSEPACK) 4 mg tablet, use as directed until gone, Disp: 1 Package, Rfl: 0    Current Facility-Administered Medications:     ciprofloxacin HCl tablet 500 mg, 500 mg, Oral, Once, Anastacio Eid Jr., MD    Review of Systems   Constitutional: Negative for activity change, appetite change, chills, diaphoresis, fatigue, fever and unexpected weight change.   HENT: Negative for congestion, dental problem, hearing loss, mouth sores, postnasal drip, rhinorrhea, sinus pressure and trouble swallowing.    Eyes: Negative for pain, discharge and itching.   Respiratory: Negative for apnea, cough, choking, chest tightness, shortness of breath and wheezing.    Cardiovascular: Negative for chest pain, palpitations and leg swelling.   Gastrointestinal: Negative for abdominal distention, abdominal pain, anal bleeding, blood in stool, constipation, diarrhea, nausea, rectal pain and vomiting.   Endocrine: Negative for polydipsia and polyuria.   Genitourinary: Negative for decreased urine volume, difficulty urinating, discharge, dysuria, enuresis, flank pain, frequency, genital sores, hematuria, penile pain, penile swelling, scrotal swelling, testicular pain and urgency.   Musculoskeletal: Negative for arthralgias, back pain and myalgias.   Skin: Negative for color change, rash and wound.   Neurological: Negative for dizziness, syncope, speech difficulty, light-headedness and headaches.   Hematological: Negative for adenopathy. Does not  bruise/bleed easily.   Psychiatric/Behavioral: Negative for behavioral problems, confusion, hallucinations and sleep disturbance.       Objective:      Physical Exam   Constitutional: He appears well-developed.   HENT:   Head: Normocephalic.   Cardiovascular: Normal rate.    Pulmonary/Chest: Effort normal.   Abdominal: Soft.   Genitourinary:    Prostate normal.      Genitourinary Comments: 30 g benign left mid shaft Peyronie's plaque and meatal stenosis     Neurological: He is alert.   Skin: Skin is warm.         Assessment:       1. Neurogenic bladder        Plan:       Diagnoses and all orders for this visit:    Neurogenic bladder        return to clinic 6 months for prostate check  An to evaluate catheterizations

## 2020-07-20 ENCOUNTER — MYC MEDICAL ADVICE (OUTPATIENT)
Dept: CARDIOLOGY | Facility: CLINIC | Age: 70
End: 2020-07-20

## 2020-07-22 NOTE — TELEPHONE ENCOUNTER
Refaxed echo orders to [phone] provided by the patient.  Called and let the patient know it was done.

## 2020-08-19 ENCOUNTER — TELEPHONE (OUTPATIENT)
Dept: CARDIOLOGY | Facility: CLINIC | Age: 70
End: 2020-08-19

## 2020-08-19 NOTE — TELEPHONE ENCOUNTER
Left VM with medical records from [facility] dept . Requesting to send us the images and results to his echocardiogram. Left my contact information and fax number.

## 2020-09-02 ENCOUNTER — DOCUMENTATION ONLY (OUTPATIENT)
Dept: CARE COORDINATION | Facility: CLINIC | Age: 70
End: 2020-09-02

## 2021-01-15 ENCOUNTER — HEALTH MAINTENANCE LETTER (OUTPATIENT)
Age: 71
End: 2021-01-15

## 2021-04-26 NOTE — IP AVS SNAPSHOT
MRN:[id number]                      After Visit Summary   8/1/2018    Migel Stringer    MRN: [id number]           Thank you!     Thank you for choosing [facility] for your care. Our goal is always to provide you with excellent care. Hearing back from our patients is one way we can continue to improve our services. Please take a few minutes to complete the written survey that you may receive in the mail after you visit with us. Thank you!        Patient Information     Date Of Birth          1950        Designated Caregiver       Most Recent Value    Caregiver    Will someone help with your care after discharge? yes    Name of designated caregiver luke    Phone number of caregiver [phone]    Caregiver address [place]      About your hospital stay     You were admitted on:  August 1, 2018 You last received care in the:  [address] [facility]    You were discharged on:  August 2, 2018        Reason for your hospital stay       History of stroke in the context of Afib. He also had history of GI bleeding form gastrojujenal ulcer. He was admitted for stroke work up, TTE, MARLENE, cardiology consult and GI consult to evaluate for the risk of anticoagulation                  Who to Call     For medical emergencies, please call 911.  For non-urgent questions about your medical care, please call your primary care provider or clinic, [phone]          Attending Provider     Provider Specialty    Zoe Bosch MD Neurology       Primary Care Provider Office Phone # Fax Gabriella Dorado [phone]      After Care Instructions     Activity       Your activity upon discharge: ambulate in house            Diet       Follow this diet upon discharge: Orders Placed This Encounter      Regular Diet Adult            Discharge Instructions       For the Atrial fibrillation: Known a-fib, not anticoagulated due to history of GI bleed 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 we would  recommend initiation of anticoagulation for stroke prophylaxis. Will defer timing of initiation to primary neurology team. Would also obtain GI consult regarding history of GI bleed. Recommend discontinuation of digoxin given baseline bradycardia.      Hypertension:   - Increase lisinopril to 20 mg daily.   - Started amlodipine 5 mg daily.   - Wean off clonidine: decrease clonidine to 0.1 mg x 3 days, then 0.05 mg x 3 days then discontinue. If home systolic BP > 160, increase amlodipine to 10 mg daily. Follow-up with PCP in 10 days for BP check and labs.     For the stroke and Afib: start Apixaban 5 mg two times per day                  Follow-up Appointments     Adult [facility] Follow-up and recommended labs and tests       Follow up with primary care provider, Joel Dorado, within 7 days to evaluate medication change.  No follow up labs or test are needed.      Appointments on [facility] and[facility]or [facility] (with [facility] or [facility] provider or service). Call [phone] if you haven't heard regarding these appointments within 7 days of discharge.                  Stroke Education     Please review the items below to help with stroke prevention.  Additional prevention suggestions are in the Understanding Stroke Handbook that you received.    Stroke risk factor changes that can help with stroke prevention:      Blood Pressure: Maintain your blood pressure within the goal the doctor sets with you.    If you are diabetic, work with your doctor to control your blood sugar and monitor your hemoglobin A1C (HbA1c).     Your doctor may recommend a statin medication to help lower your cholesterol level.    If you have an irregular heartbeat, speak to your doctor about possible treatments.    Maintain a healthy weight.    Eat a healthy diet. We suggest a Mediterranean or heart-healthy diet, but discuss what's best for you with your doctor.    Limit alcohol consumption.    If you smoke - QUIT.  We encourage you to get support.  Start by talking with your doctor. Another option is to call the Quit Plan Program at [phone].    Stroke  Warning Signs:     It s important to know the warning signs of stroke. Call 911 if you experience one or more warning signs like these:      Sudden numbness or weakness of the face, arm or leg, especially on one side of the body    Sudden confusion, trouble speaking or understanding    Sudden trouble seeing in one or both eyes    Sudden trouble walking, dizziness, loss of balance or coordination    Sudden severe headache with no known cause          Pending Results     Date and Time Order Name Status Description    8/2/2018 1212 EKG 12-lead, complete Preliminary             Statement of Approval     Ordered          08/02/18 1811  I have reviewed and agree with all the recommendations and orders detailed in this document.  EFFECTIVE NOW     Approved and electronically signed by:  Jaime Patterson MD             Admission Information     Date & Time Provider Department Dept. Phone    8/1/2018 Zoe Bosch MD [address] [facility] [phone]      Your Vitals Were     Blood Pressure Pulse Temperature Respirations Pulse Oximetry       119/74 (BP Location: Right arm) 86 97.4  F (36.3  C) (Oral) 16 99%       MyChart Information     [employer] gives you secure access to your electronic health record. If you see a primary care provider, you can also send messages to your care team and make appointments. If you have questions, please call your primary care clinic.  If you do not have a primary care provider, please call [phone] and they will assist you.        Care EveryWhere ID     This is your Care EveryWhere ID. This could be used by other organizations to access your [place] medical records  [id number]        Equal Access to Services     [facility] : Yasir Newell, cecilia booker, susan cooley. So [facility]  [phone].    ATENCIÓN: Si jagjit brown, tiene a alvarez disposición servicios gratuitos de asistencia lingüística. Jasmyne tenorio [phone].    We comply with applicable federal civil rights laws and [place] laws. We do not discriminate on the basis of race, color, national origin, age, disability, sex, sexual orientation, or gender identity.               Review of your medicines      START taking        Dose / Directions    apixaban ANTICOAGULANT 5 MG tablet   Commonly known as:  ELIQUIS   Used for:  Permanent atrial fibrillation (H)        Dose:  5 mg   Take 1 tablet (5 mg) by mouth 2 times daily   Quantity:  30 tablet   Refills:  3         CONTINUE these medicines which have NOT CHANGED        Dose / Directions    * albuterol 108 (90 Base) MCG/ACT Inhaler   Commonly known as:  PROAIR HFA/PROVENTIL HFA/VENTOLIN HFA        Dose:  1-2 puff   Inhale 1-2 puffs into the lungs 2 times daily   Refills:  0       * albuterol (2.5 MG/3ML) 0.083% neb solution        Dose:  2.5 mg   Inhale 2.5 mg into the lungs 2 times daily   Refills:  0       aspirin-acetaminophen-caffeine 250-250-65 MG per tablet   Commonly known as:  EXCEDRIN MIGRAINE        Dose:  1 tablet   Take 1 tablet by mouth as needed for headaches   Refills:  0       atorvastatin 20 MG tablet   Commonly known as:  LIPITOR        Dose:  20 mg   Take 20 mg by mouth every morning   Refills:  0       diphenhydrAMINE-acetaminophen  MG tablet   Commonly known as:  TYLENOL PM        Dose:  6 tablet   Take 6 tablets by mouth At Bedtime   Refills:  0       fentaNYL 50 mcg/hr 72 hr patch   Commonly known as:  DURAGESIC        Dose:  1 patch   Place 1 patch onto the skin every 72 hours   Refills:  0       ipratropium - albuterol 0.5 mg/2.5 mg/3 mL 0.5-2.5 (3) MG/3ML neb solution   Commonly known as:  DUONEB        Dose:  3 mL   Inhale 3 mLs into the lungs 3 times daily   Refills:  0       lisinopril 10 MG tablet   Commonly known as:  PRINIVIL/ZESTRIL        Dose:  10  mg   Take 10 mg by mouth every morning   Refills:  0       omeprazole 40 MG capsule   Commonly known as:  priLOSEC   Used for:  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   Quantity:  60 capsule   Refills:  11       oxyCODONE-acetaminophen  MG per tablet   Commonly known as:  PERCOCET        Dose:  1 tablet   Take 1 tablet by mouth every 4 hours   Refills:  0       pregabalin 100 MG capsule   Commonly known as:  LYRICA        Dose:  100 mg   Take 100 mg by mouth 4 times daily   Refills:  0       prochlorperazine 5 MG tablet   Commonly known as:  COMPAZINE        Dose:  5 mg   Take 5 mg by mouth every 6 hours as needed   Refills:  0       sucralfate 1 GM tablet   Commonly known as:  CARAFATE        Dose:  1 g   Take 1 g by mouth 4 times daily   Refills:  0       SUMAtriptan 100 MG tablet   Commonly known as:  IMITREX        Dose:  100 mg   Take 100 mg by mouth as needed   Refills:  0       tiZANidine 4 MG tablet   Commonly known as:  ZANAFLEX        Dose:  4 mg   Take 4 mg by mouth as needed   Refills:  0       * Notice:  This list has 2 medication(s) that are the same as other medications prescribed for you. Read the directions carefully, and ask your doctor or other care provider to review them with you.      STOP taking     cloNIDine 0.1 MG tablet   Commonly known as:  CATAPRES           DIGOXIN PO                Where to get your medicines      Some of these will need a paper prescription and others can be bought over the counter. Ask your nurse if you have questions.     Bring a paper prescription for each of these medications     apixaban ANTICOAGULANT 5 MG tablet                Protect others around you: Learn how to safely use, store and throw away your medicines at www.disposemymeds.org.             Medication List: This is a list of all your  medications and when to take them. Check marks below indicate your daily home schedule. Keep this list as a reference.      Medications           Morning Afternoon Evening Bedtime As Needed    * albuterol 108 (90 Base) MCG/ACT Inhaler   Commonly known as:  PROAIR HFA/PROVENTIL HFA/VENTOLIN HFA   Inhale 1-2 puffs into the lungs 2 times daily                                * albuterol (2.5 MG/3ML) 0.083% neb solution   Inhale 2.5 mg into the lungs 2 times daily                                apixaban ANTICOAGULANT 5 MG tablet   Commonly known as:  ELIQUIS   Take 1 tablet (5 mg) by mouth 2 times daily                                aspirin-acetaminophen-caffeine 250-250-65 MG per tablet   Commonly known as:  EXCEDRIN MIGRAINE   Take 1 tablet by mouth as needed for headaches                                atorvastatin 20 MG tablet   Commonly known as:  LIPITOR   Take 20 mg by mouth every morning   Last time this was given:  20 mg on 8/2/2018  7:49 AM                                diphenhydrAMINE-acetaminophen  MG tablet   Commonly known as:  TYLENOL PM   Take 6 tablets by mouth At Bedtime                                fentaNYL 50 mcg/hr 72 hr patch   Commonly known as:  DURAGESIC   Place 1 patch onto the skin every 72 hours   Last time this was given:  1 patch on 8/1/2018  8:20 PM                                ipratropium - albuterol 0.5 mg/2.5 mg/3 mL 0.5-2.5 (3) MG/3ML neb solution   Commonly known as:  DUONEB   Inhale 3 mLs into the lungs 3 times daily                                lisinopril 10 MG tablet   Commonly known as:  PRINIVIL/ZESTRIL   Take 10 mg by mouth every morning   Last time this was given:  10 mg on 8/2/2018  7:49 AM                                omeprazole 40 MG capsule   Commonly known as:  priLOSEC   Take 1 capsule (40 mg) by mouth 2 times daily Take 30-60 minutes before a meal.   Last time this was given:  40 mg on 8/2/2018  7:49 AM                                 oxyCODONE-acetaminophen  MG per tablet   Commonly known as:  PERCOCET   Take 1 tablet by mouth every 4 hours   Last time this was given:  1 tablet on 8/2/2018  3:40 PM                                pregabalin 100 MG capsule   Commonly known as:  LYRICA   Take 100 mg by mouth 4 times daily   Last time this was given:  100 mg on 8/2/2018  3:40 PM                                prochlorperazine 5 MG tablet   Commonly known as:  COMPAZINE   Take 5 mg by mouth every 6 hours as needed                                sucralfate 1 GM tablet   Commonly known as:  CARAFATE   Take 1 g by mouth 4 times daily   Last time this was given:  1 g on 8/2/2018  3:40 PM                                SUMAtriptan 100 MG tablet   Commonly known as:  IMITREX   Take 100 mg by mouth as needed                                tiZANidine 4 MG tablet   Commonly known as:  ZANAFLEX   Take 4 mg by mouth as needed                                * Notice:  This list has 2 medication(s) that are the same as other medications prescribed for you. Read the directions carefully, and ask your doctor or other care provider to review them with you.       [FreeTextEntry1] : Head:  Normocephalic Neck: Supple nontender no carotid bruits.  \par \par Mental Status:  Alert Oriented X3 Speech normal and no aphasia or dysarthria.\par \par Cranial Nerves:  PERRL, Fundi normal Visual Fields full  EOMI no diplopia no ptosis no nystagmus, V through XII intact.\par \par Motor:  No drift, normal strength tone and coordination and no focal atrophy. No abnormal movements. No dysmetria.  Normal rapid alternating movements. \par \par DTRs: Symmetric and 2+.  Plantars flexor.  No Clonus.\par \par Sensory: Unchanged\par \par \par

## 2021-09-05 ENCOUNTER — HEALTH MAINTENANCE LETTER (OUTPATIENT)
Age: 71
End: 2021-09-05

## 2022-02-20 ENCOUNTER — HEALTH MAINTENANCE LETTER (OUTPATIENT)
Age: 72
End: 2022-02-20

## 2022-10-23 ENCOUNTER — HEALTH MAINTENANCE LETTER (OUTPATIENT)
Age: 72
End: 2022-10-23

## 2023-04-02 ENCOUNTER — HEALTH MAINTENANCE LETTER (OUTPATIENT)
Age: 73
End: 2023-04-02

## 2023-08-31 NOTE — PROGRESS NOTES
Patient contacted by Dr. Jules with results.  See note for plan.    Jyothi Whitmore DNP, RN, ANP-C Patient picked up the form.

## (undated) DEVICE — GUIDEWIRE VASC SAFARI2 0.035X275CM H74939406XS1

## (undated) DEVICE — COVER ULTRASOUND PROBE W/GEL FLEXI-FEEL 6"X58" LF  25-FF658

## (undated) DEVICE — CATH ANGIO INFINITI PIGTAIL 145 6 SH 6FRX110CM  534-652S

## (undated) DEVICE — WIRE GUIDE 0.035"X260CM SAFE-T-J EXCHANGE G00517

## (undated) DEVICE — NDL TRNSEPT 18GA X 71CM 86 DEG

## (undated) DEVICE — SLEEVE TR BAND RADIAL COMPRESSION DEVICE 24CM TRB24-REG

## (undated) DEVICE — DRAPE CV SPLIT II 147X106" 9158

## (undated) DEVICE — DRAPE SPECIAL PROCEDURE ANGIO-SHIELD D2224

## (undated) DEVICE — DRSG TEGADERM 4X4 3/4" 1626W

## (undated) DEVICE — SYR ANGIOGRAPHY MULTIUSE KIT ACIST 014612

## (undated) DEVICE — CATH EP PACEL 5FRX110CM 1MM RIGHT HEART CURVE 401763

## (undated) DEVICE — SPONGE RAY-TEC 4X8" 7318

## (undated) DEVICE — SET INTRODUCER SHEATH 14FR 914ES

## (undated) DEVICE — DRSG TEGADERM 8X12" 1629

## (undated) DEVICE — SYR 50ML LL W/O NDL 309653

## (undated) DEVICE — PACK HEART LEFT CUSTOM

## (undated) DEVICE — INTRO SHEATH 6FRX25CM PINNACLE RSS606

## (undated) DEVICE — DEVICE CATH STATLOCK INTRA-AORTIC BALL PUMP 0684-00-0472

## (undated) DEVICE — CATH DELIVERY SYS ENVEO PRO FOR 16FR COREVALVE ENVPRO-16-US

## (undated) DEVICE — TAPE MEDIPORE 4"X2YD 2864

## (undated) DEVICE — CATH ANGIO SUPERTORQUE AL1 6FRX100CM 532-645

## (undated) DEVICE — BOWL 32OZ STERILE 01232

## (undated) DEVICE — Device

## (undated) DEVICE — WIPES FOLEY CARE SURESTEP PROVON DFC100

## (undated) DEVICE — SHEATH INTRODUCER DRYSEAL FLEX W/HYDRO CT 20FRX33CM DSF2033

## (undated) DEVICE — KIT MANIFORD ACIST B200

## (undated) DEVICE — CATH ANGIO SUPERTORQUE PLUS JR4 6FRX100CM 533621

## (undated) DEVICE — INTRO SHEATH MICRO PLATINUM TIP 4FRX40CM 7274

## (undated) DEVICE — INTRO SHEATH 6FRX10CM PINNACLE RSS602

## (undated) DEVICE — CONNECTOR STOPCOCK 3-WAY HIGH PRESSURE (NAMIC) H965700550091

## (undated) DEVICE — INTRODUCER SHEATH FAST-CATH SWARTZ 8.5FRX63CM SL1 CVD 406849

## (undated) DEVICE — SHTH INTRO 0.021IN ID 6FR DIA

## (undated) DEVICE — SYR VALVE LOW VISCOSITY 100ML ACIST A2000V

## (undated) DEVICE — INTRO GLIDESHEATH SLENDER 6FR 10X45CM 60-1060

## (undated) DEVICE — DRAPE STERI FLUOROSCOPE 35X43"1012 LATEX FREE

## (undated) DEVICE — SU ETHIBOND OS-4 30" X905H

## (undated) DEVICE — KIT HAND CONTROL ANGIOTOUCH ACIST 65CM AT-P65

## (undated) DEVICE — GLOVE SENSICARE 7.5 MSG1075 LATEX FREE

## (undated) DEVICE — DRSG PRIMAPORE 02X3" 7133

## (undated) DEVICE — TOTE ANGIO CORP PC15AT SAN32CC83O

## (undated) DEVICE — TUBING PRESSURE 30"

## (undated) DEVICE — CONNECTOR STOPCOCK 3 WAY MALE LL HI-FLO MX9311L

## (undated) DEVICE — NDL 27GA 1.25" 305136

## (undated) DEVICE — DEFIB PRO-PADZ LVP LQD GEL ADULT 8900-2105-01

## (undated) DEVICE — CATH BALLOON TRANSFEMORAL 20MM 9350BC20A

## (undated) DEVICE — LINEN TOWEL PACK X30 5481

## (undated) DEVICE — WIRE GUIDE 0.035"X150CM EMERALD STR 502542

## (undated) DEVICE — DRAPE SHEET REV FOLD 3/4 9349

## (undated) DEVICE — LIGHT HANDLE X1 31140133

## (undated) DEVICE — CATH SYSTEM SENTINEL CEREBRAL PROTECTION 6FR CMS15-10C-US

## (undated) DEVICE — CATH ANGIO JUDKINS R4 6FRX100CM INFINITI 534621T

## (undated) DEVICE — PREP CHLORAPREP 26ML TINTED ORANGE  260815

## (undated) DEVICE — ESU GROUND PAD ADULT REM W/15' CORD E7507DB

## (undated) DEVICE — SOL NACL 0.9% 10ML VIAL 0409-4888-02

## (undated) DEVICE — GLIDEWIRE TERUMO .035X180CM 1.5,, J-TIP GR3525

## (undated) DEVICE — RAD CLOSURE ANGIOSEAL 8FR  610131

## (undated) DEVICE — INTRO SHEATH 4FRX25CM PINNACLE MARKER RSB403

## (undated) DEVICE — DEFIB PADPRO CONMED ADULT/CHILD 2001Z-C

## (undated) DEVICE — TUBING SUCTION 10'X3/16" N510

## (undated) DEVICE — WIRE GUIDE 0.035"X150CM EMERALD J TIP 502521

## (undated) DEVICE — GUIDEWIRE ASAHI GRAND SLAM 300CM J-TIP AG141302J

## (undated) DEVICE — SYR 10ML LL W/O NDL 302995

## (undated) DEVICE — SUCTION MANIFOLD DORNOCH ULTRA CART UL-CL500

## (undated) DEVICE — GW VASC 260CM .035IN SS PTFE THSF-35-260-AES

## (undated) DEVICE — LINEN TOWEL PACK X5 5464

## (undated) DEVICE — WIRE GUIDE LUNDERQUIST 0.035"X260CM DBL CVD

## (undated) DEVICE — COVER NEOPROBE SOFTFLEX 5X96" W/BANDS 20-PC596

## (undated) DEVICE — PREP CHLORHEXIDINE 4% 4OZ (HIBICLENS) 57504

## (undated) DEVICE — INTRO SHEATH 7FRX25CM PINNACLE RSS706

## (undated) DEVICE — RAD KNIFE HANDLE W/11 BLADE DISPOSABLE 371611

## (undated) DEVICE — DRAPE IOBAN INCISE 23X17" 6650EZ

## (undated) DEVICE — FASTENER CATH BALLOON CLAMPX2 STATLOCK 0684-00-493

## (undated) DEVICE — LINEN TOWEL PACK X6 WHITE 5487

## (undated) DEVICE — WIRE GUIDE 0.04"X300CM GRANDSLAM J TP AG141302J

## (undated) DEVICE — PROTECTOR ARM ONE-STEP TRENDELENBURG 40418

## (undated) RX ORDER — LIDOCAINE HYDROCHLORIDE 20 MG/ML
INJECTION, SOLUTION EPIDURAL; INFILTRATION; INTRACAUDAL; PERINEURAL
Status: DISPENSED
Start: 2019-10-23

## (undated) RX ORDER — HEPARIN SODIUM 1000 [USP'U]/ML
INJECTION, SOLUTION INTRAVENOUS; SUBCUTANEOUS
Status: DISPENSED
Start: 2019-02-05

## (undated) RX ORDER — DOBUTAMINE HYDROCHLORIDE 200 MG/100ML
INJECTION INTRAVENOUS
Status: DISPENSED
Start: 2018-07-30

## (undated) RX ORDER — FENTANYL CITRATE 50 UG/ML
INJECTION, SOLUTION INTRAMUSCULAR; INTRAVENOUS
Status: DISPENSED
Start: 2018-08-02

## (undated) RX ORDER — HYDROMORPHONE HYDROCHLORIDE 1 MG/ML
INJECTION, SOLUTION INTRAMUSCULAR; INTRAVENOUS; SUBCUTANEOUS
Status: DISPENSED
Start: 2019-02-05

## (undated) RX ORDER — ESMOLOL HYDROCHLORIDE 10 MG/ML
INJECTION INTRAVENOUS
Status: DISPENSED
Start: 2019-10-23

## (undated) RX ORDER — HYDROMORPHONE HYDROCHLORIDE 1 MG/ML
INJECTION, SOLUTION INTRAMUSCULAR; INTRAVENOUS; SUBCUTANEOUS
Status: DISPENSED
Start: 2019-10-23

## (undated) RX ORDER — NITROGLYCERIN 5 MG/ML
VIAL (ML) INTRAVENOUS
Status: DISPENSED
Start: 2018-07-30

## (undated) RX ORDER — FENTANYL CITRATE 50 UG/ML
INJECTION, SOLUTION INTRAMUSCULAR; INTRAVENOUS
Status: DISPENSED
Start: 2019-04-02

## (undated) RX ORDER — ASPIRIN 325 MG
TABLET ORAL
Status: DISPENSED
Start: 2018-07-30

## (undated) RX ORDER — ACETAMINOPHEN 325 MG/1
TABLET ORAL
Status: DISPENSED
Start: 2019-10-23

## (undated) RX ORDER — PHENYLEPHRINE HCL IN 0.9% NACL 1 MG/10 ML
SYRINGE (ML) INTRAVENOUS
Status: DISPENSED
Start: 2019-10-30

## (undated) RX ORDER — EPHEDRINE SULFATE 50 MG/ML
INJECTION, SOLUTION INTRAMUSCULAR; INTRAVENOUS; SUBCUTANEOUS
Status: DISPENSED
Start: 2019-10-30

## (undated) RX ORDER — OXYCODONE AND ACETAMINOPHEN 5; 325 MG/1; MG/1
TABLET ORAL
Status: DISPENSED
Start: 2019-10-23

## (undated) RX ORDER — PROTAMINE SULFATE 10 MG/ML
INJECTION, SOLUTION INTRAVENOUS
Status: DISPENSED
Start: 2019-10-23

## (undated) RX ORDER — METOPROLOL TARTRATE 1 MG/ML
INJECTION, SOLUTION INTRAVENOUS
Status: DISPENSED
Start: 2018-07-30

## (undated) RX ORDER — GLYCOPYRROLATE 0.2 MG/ML
INJECTION, SOLUTION INTRAMUSCULAR; INTRAVENOUS
Status: DISPENSED
Start: 2019-10-30

## (undated) RX ORDER — SODIUM CHLORIDE, SODIUM LACTATE, POTASSIUM CHLORIDE, CALCIUM CHLORIDE 600; 310; 30; 20 MG/100ML; MG/100ML; MG/100ML; MG/100ML
INJECTION, SOLUTION INTRAVENOUS
Status: DISPENSED
Start: 2019-10-23

## (undated) RX ORDER — LIDOCAINE HYDROCHLORIDE 10 MG/ML
INJECTION, SOLUTION EPIDURAL; INFILTRATION; INTRACAUDAL; PERINEURAL
Status: DISPENSED
Start: 2019-02-05

## (undated) RX ORDER — SODIUM CHLORIDE 9 MG/ML
INJECTION, SOLUTION INTRAVENOUS
Status: DISPENSED
Start: 2018-07-30

## (undated) RX ORDER — HYDRALAZINE HYDROCHLORIDE 20 MG/ML
INJECTION INTRAMUSCULAR; INTRAVENOUS
Status: DISPENSED
Start: 2018-07-30

## (undated) RX ORDER — FENTANYL CITRATE 50 UG/ML
INJECTION, SOLUTION INTRAMUSCULAR; INTRAVENOUS
Status: DISPENSED
Start: 2018-07-30

## (undated) RX ORDER — FENTANYL CITRATE 50 UG/ML
INJECTION, SOLUTION INTRAMUSCULAR; INTRAVENOUS
Status: DISPENSED
Start: 2019-02-05

## (undated) RX ORDER — NICARDIPINE HYDROCHLORIDE 2.5 MG/ML
INJECTION INTRAVENOUS
Status: DISPENSED
Start: 2018-07-30

## (undated) RX ORDER — FENTANYL CITRATE 50 UG/ML
INJECTION, SOLUTION INTRAMUSCULAR; INTRAVENOUS
Status: DISPENSED
Start: 2019-10-23

## (undated) RX ORDER — HEPARIN SODIUM 1000 [USP'U]/ML
INJECTION, SOLUTION INTRAVENOUS; SUBCUTANEOUS
Status: DISPENSED
Start: 2019-10-23

## (undated) RX ORDER — GLYCOPYRROLATE 0.2 MG/ML
INJECTION, SOLUTION INTRAMUSCULAR; INTRAVENOUS
Status: DISPENSED
Start: 2019-10-23

## (undated) RX ORDER — MORPHINE SULFATE 2 MG/ML
INJECTION, SOLUTION INTRAMUSCULAR; INTRAVENOUS
Status: DISPENSED
Start: 2019-10-23

## (undated) RX ORDER — LIDOCAINE HYDROCHLORIDE 10 MG/ML
INJECTION, SOLUTION EPIDURAL; INFILTRATION; INTRACAUDAL; PERINEURAL
Status: DISPENSED
Start: 2018-07-19

## (undated) RX ORDER — PROPOFOL 10 MG/ML
INJECTION, EMULSION INTRAVENOUS
Status: DISPENSED
Start: 2019-10-30

## (undated) RX ORDER — ONDANSETRON 2 MG/ML
INJECTION INTRAMUSCULAR; INTRAVENOUS
Status: DISPENSED
Start: 2019-10-30

## (undated) RX ORDER — PROPOFOL 10 MG/ML
INJECTION, EMULSION INTRAVENOUS
Status: DISPENSED
Start: 2019-10-23